# Patient Record
Sex: MALE | Race: WHITE | NOT HISPANIC OR LATINO | Employment: FULL TIME | URBAN - METROPOLITAN AREA
[De-identification: names, ages, dates, MRNs, and addresses within clinical notes are randomized per-mention and may not be internally consistent; named-entity substitution may affect disease eponyms.]

---

## 2017-01-12 ENCOUNTER — APPOINTMENT (OUTPATIENT)
Dept: GENERAL RADIOLOGY | Facility: HOSPITAL | Age: 47
End: 2017-01-12
Attending: THORACIC SURGERY (CARDIOTHORACIC VASCULAR SURGERY)

## 2017-01-12 ENCOUNTER — ANESTHESIA EVENT (OUTPATIENT)
Dept: PERIOP | Facility: HOSPITAL | Age: 47
End: 2017-01-12

## 2017-01-12 ENCOUNTER — APPOINTMENT (OUTPATIENT)
Dept: CARDIOLOGY | Facility: HOSPITAL | Age: 47
End: 2017-01-12
Attending: THORACIC SURGERY (CARDIOTHORACIC VASCULAR SURGERY)

## 2017-01-12 ENCOUNTER — HOSPITAL ENCOUNTER (INPATIENT)
Facility: HOSPITAL | Age: 47
LOS: 5 days | Discharge: HOME HEALTH | End: 2017-01-18
Attending: EMERGENCY MEDICINE | Admitting: INTERNAL MEDICINE

## 2017-01-12 ENCOUNTER — APPOINTMENT (OUTPATIENT)
Dept: RESPIRATORY THERAPY | Facility: HOSPITAL | Age: 47
End: 2017-01-12
Attending: THORACIC SURGERY (CARDIOTHORACIC VASCULAR SURGERY)

## 2017-01-12 ENCOUNTER — APPOINTMENT (OUTPATIENT)
Dept: GENERAL RADIOLOGY | Facility: HOSPITAL | Age: 47
End: 2017-01-12

## 2017-01-12 DIAGNOSIS — Z95.1 S/P CABG (CORONARY ARTERY BYPASS GRAFT): ICD-10-CM

## 2017-01-12 DIAGNOSIS — R07.9 CHEST PAIN AT REST: Primary | ICD-10-CM

## 2017-01-12 DIAGNOSIS — R53.1 GENERALIZED WEAKNESS: ICD-10-CM

## 2017-01-12 DIAGNOSIS — I20.8 ANGINA AT REST (HCC): ICD-10-CM

## 2017-01-12 LAB
ABO GROUP BLD: NORMAL
ALBUMIN SERPL-MCNC: 4.3 G/DL (ref 3.5–5.2)
ALBUMIN SERPL-MCNC: 4.4 G/DL (ref 3.5–5.2)
ALBUMIN/GLOB SERPL: 1.5 G/DL
ALBUMIN/GLOB SERPL: 1.6 G/DL
ALP SERPL-CCNC: 73 U/L (ref 39–117)
ALP SERPL-CCNC: 77 U/L (ref 39–117)
ALT SERPL W P-5'-P-CCNC: 48 U/L (ref 1–41)
ALT SERPL W P-5'-P-CCNC: 51 U/L (ref 1–41)
ANION GAP SERPL CALCULATED.3IONS-SCNC: 13.7 MMOL/L
ANION GAP SERPL CALCULATED.3IONS-SCNC: 14.9 MMOL/L
AST SERPL-CCNC: 31 U/L (ref 1–40)
AST SERPL-CCNC: 32 U/L (ref 1–40)
BASOPHILS # BLD AUTO: 0.03 10*3/MM3 (ref 0–0.2)
BASOPHILS # BLD AUTO: 0.03 10*3/MM3 (ref 0–0.2)
BASOPHILS NFR BLD AUTO: 0.4 % (ref 0–1.5)
BASOPHILS NFR BLD AUTO: 0.4 % (ref 0–1.5)
BILIRUB SERPL-MCNC: 0.3 MG/DL (ref 0.1–1.2)
BILIRUB SERPL-MCNC: 0.4 MG/DL (ref 0.1–1.2)
BILIRUB UR QL STRIP: NEGATIVE
BLD GP AB SCN SERPL QL: NEGATIVE
BUN BLD-MCNC: 10 MG/DL (ref 6–20)
BUN BLD-MCNC: 16 MG/DL (ref 6–20)
BUN/CREAT SERPL: 11.6 (ref 7–25)
BUN/CREAT SERPL: 14.8 (ref 7–25)
CALCIUM SPEC-SCNC: 9.5 MG/DL (ref 8.6–10.5)
CALCIUM SPEC-SCNC: 9.6 MG/DL (ref 8.6–10.5)
CHLORIDE SERPL-SCNC: 100 MMOL/L (ref 98–107)
CHLORIDE SERPL-SCNC: 101 MMOL/L (ref 98–107)
CHOLEST SERPL-MCNC: 144 MG/DL (ref 0–200)
CLARITY UR: CLEAR
CLOSE TME COLL+ADP + EPINEP PNL BLD: 74 %
CO2 SERPL-SCNC: 26.1 MMOL/L (ref 22–29)
CO2 SERPL-SCNC: 26.3 MMOL/L (ref 22–29)
COLOR UR: YELLOW
CREAT BLD-MCNC: 0.86 MG/DL (ref 0.76–1.27)
CREAT BLD-MCNC: 1.08 MG/DL (ref 0.76–1.27)
DEPRECATED RDW RBC AUTO: 41.7 FL (ref 37–54)
DEPRECATED RDW RBC AUTO: 41.8 FL (ref 37–54)
EOSINOPHIL # BLD AUTO: 0.23 10*3/MM3 (ref 0–0.7)
EOSINOPHIL # BLD AUTO: 0.25 10*3/MM3 (ref 0–0.7)
EOSINOPHIL NFR BLD AUTO: 3.1 % (ref 0.3–6.2)
EOSINOPHIL NFR BLD AUTO: 3.2 % (ref 0.3–6.2)
ERYTHROCYTE [DISTWIDTH] IN BLOOD BY AUTOMATED COUNT: 11.5 % (ref 11.5–14.5)
ERYTHROCYTE [DISTWIDTH] IN BLOOD BY AUTOMATED COUNT: 11.6 % (ref 11.5–14.5)
GFR SERPL CREATININE-BSD FRML MDRD: 73 ML/MIN/1.73
GFR SERPL CREATININE-BSD FRML MDRD: 95 ML/MIN/1.73
GLOBULIN UR ELPH-MCNC: 2.7 GM/DL
GLOBULIN UR ELPH-MCNC: 2.8 GM/DL
GLUCOSE BLD-MCNC: 167 MG/DL (ref 65–99)
GLUCOSE BLD-MCNC: 234 MG/DL (ref 65–99)
GLUCOSE UR STRIP-MCNC: NEGATIVE MG/DL
HBA1C MFR BLD: 7.2 % (ref 4.8–5.6)
HCT VFR BLD AUTO: 43.6 % (ref 40.4–52.2)
HCT VFR BLD AUTO: 45.5 % (ref 40.4–52.2)
HDLC SERPL-MCNC: 29 MG/DL (ref 40–60)
HGB BLD-MCNC: 15.7 G/DL (ref 13.7–17.6)
HGB BLD-MCNC: 15.7 G/DL (ref 13.7–17.6)
HGB UR QL STRIP.AUTO: NEGATIVE
IMM GRANULOCYTES # BLD: 0 10*3/MM3 (ref 0–0.03)
IMM GRANULOCYTES # BLD: 0.02 10*3/MM3 (ref 0–0.03)
IMM GRANULOCYTES NFR BLD: 0 % (ref 0–0.5)
IMM GRANULOCYTES NFR BLD: 0.3 % (ref 0–0.5)
KETONES UR QL STRIP: NEGATIVE
LDLC SERPL CALC-MCNC: 82 MG/DL (ref 0–100)
LDLC/HDLC SERPL: 2.83 {RATIO}
LEUKOCYTE ESTERASE UR QL STRIP.AUTO: NEGATIVE
LYMPHOCYTES # BLD AUTO: 3.07 10*3/MM3 (ref 0.9–4.8)
LYMPHOCYTES # BLD AUTO: 3.73 10*3/MM3 (ref 0.9–4.8)
LYMPHOCYTES NFR BLD AUTO: 42.5 % (ref 19.6–45.3)
LYMPHOCYTES NFR BLD AUTO: 46.3 % (ref 19.6–45.3)
MCH RBC QN AUTO: 34.3 PG (ref 27–32.7)
MCH RBC QN AUTO: 35.7 PG (ref 27–32.7)
MCHC RBC AUTO-ENTMCNC: 34.5 G/DL (ref 32.6–36.4)
MCHC RBC AUTO-ENTMCNC: 36 G/DL (ref 32.6–36.4)
MCV RBC AUTO: 99.1 FL (ref 79.8–96.2)
MCV RBC AUTO: 99.3 FL (ref 79.8–96.2)
MONOCYTES # BLD AUTO: 0.47 10*3/MM3 (ref 0.2–1.2)
MONOCYTES # BLD AUTO: 0.74 10*3/MM3 (ref 0.2–1.2)
MONOCYTES NFR BLD AUTO: 6.5 % (ref 5–12)
MONOCYTES NFR BLD AUTO: 9.2 % (ref 5–12)
NEUTROPHILS # BLD AUTO: 3.3 10*3/MM3 (ref 1.9–8.1)
NEUTROPHILS # BLD AUTO: 3.4 10*3/MM3 (ref 1.9–8.1)
NEUTROPHILS NFR BLD AUTO: 41 % (ref 42.7–76)
NEUTROPHILS NFR BLD AUTO: 47.1 % (ref 42.7–76)
NITRITE UR QL STRIP: NEGATIVE
PH UR STRIP.AUTO: 7 [PH] (ref 5–8)
PLATELET # BLD AUTO: 196 10*3/MM3 (ref 140–500)
PLATELET # BLD AUTO: 200 10*3/MM3 (ref 140–500)
PMV BLD AUTO: 10.3 FL (ref 6–12)
PMV BLD AUTO: 10.3 FL (ref 6–12)
POTASSIUM BLD-SCNC: 4 MMOL/L (ref 3.5–5.2)
POTASSIUM BLD-SCNC: 4.1 MMOL/L (ref 3.5–5.2)
PROT SERPL-MCNC: 7.1 G/DL (ref 6–8.5)
PROT SERPL-MCNC: 7.1 G/DL (ref 6–8.5)
PROT UR QL STRIP: NEGATIVE
RBC # BLD AUTO: 4.4 10*6/MM3 (ref 4.6–6)
RBC # BLD AUTO: 4.58 10*6/MM3 (ref 4.6–6)
RH BLD: POSITIVE
SODIUM BLD-SCNC: 140 MMOL/L (ref 136–145)
SODIUM BLD-SCNC: 142 MMOL/L (ref 136–145)
SP GR UR STRIP: 1.02 (ref 1–1.03)
TRIGL SERPL-MCNC: 165 MG/DL (ref 0–150)
TROPONIN T SERPL-MCNC: <0.01 NG/ML (ref 0–0.03)
TROPONIN T SERPL-MCNC: <0.01 NG/ML (ref 0–0.03)
UROBILINOGEN UR QL STRIP: NORMAL
VLDLC SERPL-MCNC: 33 MG/DL (ref 5–40)
WBC NRBC COR # BLD: 7.22 10*3/MM3 (ref 4.5–10.7)
WBC NRBC COR # BLD: 8.05 10*3/MM3 (ref 4.5–10.7)

## 2017-01-12 PROCEDURE — G0378 HOSPITAL OBSERVATION PER HR: HCPCS

## 2017-01-12 PROCEDURE — 99285 EMERGENCY DEPT VISIT HI MDM: CPT

## 2017-01-12 PROCEDURE — 93010 ELECTROCARDIOGRAM REPORT: CPT | Performed by: INTERNAL MEDICINE

## 2017-01-12 PROCEDURE — 93571 IV DOP VEL&/PRESS C FLO 1ST: CPT | Performed by: INTERNAL MEDICINE

## 2017-01-12 PROCEDURE — C1769 GUIDE WIRE: HCPCS | Performed by: INTERNAL MEDICINE

## 2017-01-12 PROCEDURE — B2151ZZ FLUOROSCOPY OF LEFT HEART USING LOW OSMOLAR CONTRAST: ICD-10-PCS | Performed by: INTERNAL MEDICINE

## 2017-01-12 PROCEDURE — 99205 OFFICE O/P NEW HI 60 MIN: CPT | Performed by: PHYSICIAN ASSISTANT

## 2017-01-12 PROCEDURE — 4A023N7 MEASUREMENT OF CARDIAC SAMPLING AND PRESSURE, LEFT HEART, PERCUTANEOUS APPROACH: ICD-10-PCS | Performed by: INTERNAL MEDICINE

## 2017-01-12 PROCEDURE — 4A033BC MEASUREMENT OF ARTERIAL PRESSURE, CORONARY, PERCUTANEOUS APPROACH: ICD-10-PCS | Performed by: INTERNAL MEDICINE

## 2017-01-12 PROCEDURE — 93880 EXTRACRANIAL BILAT STUDY: CPT

## 2017-01-12 PROCEDURE — 71020 HC CHEST PA AND LATERAL: CPT

## 2017-01-12 PROCEDURE — 86850 RBC ANTIBODY SCREEN: CPT

## 2017-01-12 PROCEDURE — 25010000002 BH (CUPID ONLY) ADENOSINE 6 MG/100ML MIXTURE: Performed by: INTERNAL MEDICINE

## 2017-01-12 PROCEDURE — 25010000002 MIDAZOLAM PER 1 MG: Performed by: INTERNAL MEDICINE

## 2017-01-12 PROCEDURE — 81003 URINALYSIS AUTO W/O SCOPE: CPT | Performed by: THORACIC SURGERY (CARDIOTHORACIC VASCULAR SURGERY)

## 2017-01-12 PROCEDURE — 94010 BREATHING CAPACITY TEST: CPT

## 2017-01-12 PROCEDURE — 84484 ASSAY OF TROPONIN QUANT: CPT | Performed by: EMERGENCY MEDICINE

## 2017-01-12 PROCEDURE — C1894 INTRO/SHEATH, NON-LASER: HCPCS | Performed by: INTERNAL MEDICINE

## 2017-01-12 PROCEDURE — 83036 HEMOGLOBIN GLYCOSYLATED A1C: CPT | Performed by: THORACIC SURGERY (CARDIOTHORACIC VASCULAR SURGERY)

## 2017-01-12 PROCEDURE — 85576 BLOOD PLATELET AGGREGATION: CPT | Performed by: THORACIC SURGERY (CARDIOTHORACIC VASCULAR SURGERY)

## 2017-01-12 PROCEDURE — 93005 ELECTROCARDIOGRAM TRACING: CPT | Performed by: EMERGENCY MEDICINE

## 2017-01-12 PROCEDURE — 93458 L HRT ARTERY/VENTRICLE ANGIO: CPT | Performed by: INTERNAL MEDICINE

## 2017-01-12 PROCEDURE — 93970 EXTREMITY STUDY: CPT

## 2017-01-12 PROCEDURE — 86900 BLOOD TYPING SEROLOGIC ABO: CPT

## 2017-01-12 PROCEDURE — 86901 BLOOD TYPING SEROLOGIC RH(D): CPT

## 2017-01-12 PROCEDURE — 80053 COMPREHEN METABOLIC PANEL: CPT | Performed by: EMERGENCY MEDICINE

## 2017-01-12 PROCEDURE — 80053 COMPREHEN METABOLIC PANEL: CPT | Performed by: THORACIC SURGERY (CARDIOTHORACIC VASCULAR SURGERY)

## 2017-01-12 PROCEDURE — 84484 ASSAY OF TROPONIN QUANT: CPT | Performed by: INTERNAL MEDICINE

## 2017-01-12 PROCEDURE — 0 IOPAMIDOL PER 1 ML: Performed by: INTERNAL MEDICINE

## 2017-01-12 PROCEDURE — 25010000002 HEPARIN (PORCINE) PER 1000 UNITS: Performed by: INTERNAL MEDICINE

## 2017-01-12 PROCEDURE — 93005 ELECTROCARDIOGRAM TRACING: CPT | Performed by: INTERNAL MEDICINE

## 2017-01-12 PROCEDURE — 94799 UNLISTED PULMONARY SVC/PX: CPT

## 2017-01-12 PROCEDURE — 85025 COMPLETE CBC W/AUTO DIFF WBC: CPT | Performed by: THORACIC SURGERY (CARDIOTHORACIC VASCULAR SURGERY)

## 2017-01-12 PROCEDURE — 85025 COMPLETE CBC W/AUTO DIFF WBC: CPT | Performed by: EMERGENCY MEDICINE

## 2017-01-12 PROCEDURE — B2111ZZ FLUOROSCOPY OF MULTIPLE CORONARY ARTERIES USING LOW OSMOLAR CONTRAST: ICD-10-PCS | Performed by: INTERNAL MEDICINE

## 2017-01-12 PROCEDURE — 25010000002 FENTANYL CITRATE (PF) 100 MCG/2ML SOLUTION: Performed by: INTERNAL MEDICINE

## 2017-01-12 PROCEDURE — 99153 MOD SED SAME PHYS/QHP EA: CPT | Performed by: INTERNAL MEDICINE

## 2017-01-12 PROCEDURE — C1887 CATHETER, GUIDING: HCPCS | Performed by: INTERNAL MEDICINE

## 2017-01-12 PROCEDURE — 80061 LIPID PANEL: CPT | Performed by: THORACIC SURGERY (CARDIOTHORACIC VASCULAR SURGERY)

## 2017-01-12 PROCEDURE — 99152 MOD SED SAME PHYS/QHP 5/>YRS: CPT | Performed by: INTERNAL MEDICINE

## 2017-01-12 PROCEDURE — 86923 COMPATIBILITY TEST ELECTRIC: CPT

## 2017-01-12 RX ORDER — ALPRAZOLAM 0.25 MG/1
0.25 TABLET ORAL EVERY 8 HOURS PRN
Status: DISCONTINUED | OUTPATIENT
Start: 2017-01-12 | End: 2017-01-13

## 2017-01-12 RX ORDER — ACETAMINOPHEN 325 MG/1
650 TABLET ORAL EVERY 4 HOURS PRN
Status: DISCONTINUED | OUTPATIENT
Start: 2017-01-12 | End: 2017-01-13

## 2017-01-12 RX ORDER — SODIUM CHLORIDE 9 MG/ML
75 INJECTION, SOLUTION INTRAVENOUS CONTINUOUS
Status: DISCONTINUED | OUTPATIENT
Start: 2017-01-12 | End: 2017-01-12

## 2017-01-12 RX ORDER — ASPIRIN 325 MG
325 TABLET ORAL ONCE
Status: DISCONTINUED | OUTPATIENT
Start: 2017-01-12 | End: 2017-01-12

## 2017-01-12 RX ORDER — HEPARIN SODIUM 1000 [USP'U]/ML
INJECTION, SOLUTION INTRAVENOUS; SUBCUTANEOUS AS NEEDED
Status: DISCONTINUED | OUTPATIENT
Start: 2017-01-12 | End: 2017-01-12 | Stop reason: HOSPADM

## 2017-01-12 RX ORDER — ONDANSETRON 4 MG/1
4 TABLET, ORALLY DISINTEGRATING ORAL EVERY 6 HOURS PRN
Status: DISCONTINUED | OUTPATIENT
Start: 2017-01-12 | End: 2017-01-18 | Stop reason: HOSPADM

## 2017-01-12 RX ORDER — RANITIDINE 150 MG/1
150 TABLET ORAL 2 TIMES DAILY
COMMUNITY
End: 2017-01-18 | Stop reason: HOSPADM

## 2017-01-12 RX ORDER — GLIMEPIRIDE 2 MG/1
2 TABLET ORAL 2 TIMES DAILY WITH MEALS
Status: DISCONTINUED | OUTPATIENT
Start: 2017-01-12 | End: 2017-01-13

## 2017-01-12 RX ORDER — MORPHINE SULFATE 2 MG/ML
1 INJECTION, SOLUTION INTRAMUSCULAR; INTRAVENOUS EVERY 4 HOURS PRN
Status: DISCONTINUED | OUTPATIENT
Start: 2017-01-12 | End: 2017-01-13

## 2017-01-12 RX ORDER — SODIUM CHLORIDE 9 MG/ML
INJECTION, SOLUTION INTRAVENOUS CONTINUOUS PRN
Status: DISCONTINUED | OUTPATIENT
Start: 2017-01-12 | End: 2017-01-12 | Stop reason: HOSPADM

## 2017-01-12 RX ORDER — CHLORHEXIDINE GLUCONATE 0.12 MG/ML
15 RINSE ORAL EVERY 12 HOURS
Status: COMPLETED | OUTPATIENT
Start: 2017-01-12 | End: 2017-01-13

## 2017-01-12 RX ORDER — ATORVASTATIN CALCIUM 20 MG/1
20 TABLET, FILM COATED ORAL DAILY
COMMUNITY
End: 2017-02-14 | Stop reason: ALTCHOICE

## 2017-01-12 RX ORDER — FENTANYL CITRATE 50 UG/ML
INJECTION, SOLUTION INTRAMUSCULAR; INTRAVENOUS AS NEEDED
Status: DISCONTINUED | OUTPATIENT
Start: 2017-01-12 | End: 2017-01-12 | Stop reason: HOSPADM

## 2017-01-12 RX ORDER — TEMAZEPAM 15 MG/1
15 CAPSULE ORAL NIGHTLY PRN
Status: DISCONTINUED | OUTPATIENT
Start: 2017-01-12 | End: 2017-01-18 | Stop reason: HOSPADM

## 2017-01-12 RX ORDER — MIDAZOLAM HYDROCHLORIDE 1 MG/ML
2 INJECTION INTRAMUSCULAR; INTRAVENOUS
Status: COMPLETED | OUTPATIENT
Start: 2017-01-13 | End: 2017-01-13

## 2017-01-12 RX ORDER — ASPIRIN 325 MG
325 TABLET ORAL DAILY
Status: DISCONTINUED | OUTPATIENT
Start: 2017-01-13 | End: 2017-01-13

## 2017-01-12 RX ORDER — GLIMEPIRIDE 2 MG/1
2 TABLET ORAL 2 TIMES DAILY
COMMUNITY
End: 2017-01-18 | Stop reason: HOSPADM

## 2017-01-12 RX ORDER — ASPIRIN 325 MG
325 TABLET ORAL DAILY
COMMUNITY
End: 2017-01-18 | Stop reason: HOSPADM

## 2017-01-12 RX ORDER — LISINOPRIL 20 MG/1
20 TABLET ORAL DAILY
COMMUNITY
End: 2017-01-18 | Stop reason: HOSPADM

## 2017-01-12 RX ORDER — CHLORHEXIDINE GLUCONATE 500 MG/1
1 CLOTH TOPICAL EVERY 12 HOURS PRN
Status: DISCONTINUED | OUTPATIENT
Start: 2017-01-12 | End: 2017-01-13

## 2017-01-12 RX ORDER — AMIODARONE HYDROCHLORIDE 200 MG/1
200 TABLET ORAL EVERY 8 HOURS
Status: DISCONTINUED | OUTPATIENT
Start: 2017-01-12 | End: 2017-01-13

## 2017-01-12 RX ORDER — ASPIRIN 325 MG
325 TABLET ORAL DAILY
Status: DISCONTINUED | OUTPATIENT
Start: 2017-01-12 | End: 2017-01-12

## 2017-01-12 RX ORDER — MAGNESIUM HYDROXIDE/ALUMINUM HYDROXICE/SIMETHICONE 120; 1200; 1200 MG/30ML; MG/30ML; MG/30ML
30 SUSPENSION ORAL EVERY 6 HOURS PRN
Status: DISCONTINUED | OUTPATIENT
Start: 2017-01-12 | End: 2017-01-13

## 2017-01-12 RX ORDER — FAMOTIDINE 10 MG/ML
20 INJECTION, SOLUTION INTRAVENOUS
Status: COMPLETED | OUTPATIENT
Start: 2017-01-13 | End: 2017-01-13

## 2017-01-12 RX ORDER — ONDANSETRON 2 MG/ML
4 INJECTION INTRAMUSCULAR; INTRAVENOUS EVERY 6 HOURS PRN
Status: DISCONTINUED | OUTPATIENT
Start: 2017-01-12 | End: 2017-01-18 | Stop reason: HOSPADM

## 2017-01-12 RX ORDER — LISINOPRIL 20 MG/1
20 TABLET ORAL DAILY
Status: DISCONTINUED | OUTPATIENT
Start: 2017-01-12 | End: 2017-01-13

## 2017-01-12 RX ORDER — CEFAZOLIN SODIUM 2 G/100ML
2 INJECTION, SOLUTION INTRAVENOUS
Status: COMPLETED | OUTPATIENT
Start: 2017-01-13 | End: 2017-01-13

## 2017-01-12 RX ORDER — NITROGLYCERIN 0.4 MG/1
0.4 TABLET SUBLINGUAL
Status: DISCONTINUED | OUTPATIENT
Start: 2017-01-12 | End: 2017-01-13

## 2017-01-12 RX ORDER — HYDROCODONE BITARTRATE AND ACETAMINOPHEN 5; 325 MG/1; MG/1
1 TABLET ORAL EVERY 4 HOURS PRN
Status: DISCONTINUED | OUTPATIENT
Start: 2017-01-12 | End: 2017-01-13

## 2017-01-12 RX ORDER — POTASSIUM CHLORIDE 1.5 G/1.77G
40 POWDER, FOR SOLUTION ORAL AS NEEDED
Status: DISCONTINUED | OUTPATIENT
Start: 2017-01-12 | End: 2017-01-13

## 2017-01-12 RX ORDER — POTASSIUM CHLORIDE 750 MG/1
40 CAPSULE, EXTENDED RELEASE ORAL AS NEEDED
Status: DISCONTINUED | OUTPATIENT
Start: 2017-01-12 | End: 2017-01-13

## 2017-01-12 RX ORDER — MIDAZOLAM HYDROCHLORIDE 1 MG/ML
INJECTION INTRAMUSCULAR; INTRAVENOUS AS NEEDED
Status: DISCONTINUED | OUTPATIENT
Start: 2017-01-12 | End: 2017-01-12 | Stop reason: HOSPADM

## 2017-01-12 RX ORDER — DIPHENHYDRAMINE HCL 25 MG
25 CAPSULE ORAL NIGHTLY PRN
Status: DISCONTINUED | OUTPATIENT
Start: 2017-01-12 | End: 2017-01-13

## 2017-01-12 RX ORDER — DEXTROSE MONOHYDRATE 25 G/50ML
25 INJECTION, SOLUTION INTRAVENOUS
Status: DISCONTINUED | OUTPATIENT
Start: 2017-01-12 | End: 2017-01-13

## 2017-01-12 RX ORDER — NICOTINE POLACRILEX 4 MG
15 LOZENGE BUCCAL
Status: DISCONTINUED | OUTPATIENT
Start: 2017-01-12 | End: 2017-01-13

## 2017-01-12 RX ORDER — ONDANSETRON 4 MG/1
4 TABLET, FILM COATED ORAL EVERY 6 HOURS PRN
Status: DISCONTINUED | OUTPATIENT
Start: 2017-01-12 | End: 2017-01-18 | Stop reason: HOSPADM

## 2017-01-12 RX ORDER — SODIUM CHLORIDE 0.9 % (FLUSH) 0.9 %
1-10 SYRINGE (ML) INJECTION AS NEEDED
Status: DISCONTINUED | OUTPATIENT
Start: 2017-01-12 | End: 2017-01-18 | Stop reason: HOSPADM

## 2017-01-12 RX ORDER — LIDOCAINE HYDROCHLORIDE 20 MG/ML
INJECTION, SOLUTION INFILTRATION; PERINEURAL AS NEEDED
Status: DISCONTINUED | OUTPATIENT
Start: 2017-01-12 | End: 2017-01-12 | Stop reason: HOSPADM

## 2017-01-12 RX ORDER — NALOXONE HCL 0.4 MG/ML
0.4 VIAL (ML) INJECTION
Status: DISCONTINUED | OUTPATIENT
Start: 2017-01-12 | End: 2017-01-13

## 2017-01-12 RX ORDER — ATORVASTATIN CALCIUM 40 MG/1
40 TABLET, FILM COATED ORAL NIGHTLY
Status: DISCONTINUED | OUTPATIENT
Start: 2017-01-12 | End: 2017-01-18 | Stop reason: HOSPADM

## 2017-01-12 RX ORDER — FAMOTIDINE 20 MG/1
20 TABLET, FILM COATED ORAL 2 TIMES DAILY
Status: DISCONTINUED | OUTPATIENT
Start: 2017-01-12 | End: 2017-01-13

## 2017-01-12 RX ADMIN — LISINOPRIL 20 MG: 20 TABLET ORAL at 12:32

## 2017-01-12 RX ADMIN — MUPIROCIN 1 APPLICATION: 20 OINTMENT TOPICAL at 21:52

## 2017-01-12 RX ADMIN — FAMOTIDINE 20 MG: 20 TABLET, FILM COATED ORAL at 12:32

## 2017-01-12 RX ADMIN — GLIMEPIRIDE 2 MG: 2 TABLET ORAL at 20:17

## 2017-01-12 RX ADMIN — GLIMEPIRIDE 2 MG: 2 TABLET ORAL at 12:33

## 2017-01-12 RX ADMIN — ATORVASTATIN CALCIUM 40 MG: 40 TABLET, FILM COATED ORAL at 20:16

## 2017-01-12 RX ADMIN — FAMOTIDINE 20 MG: 20 TABLET, FILM COATED ORAL at 20:17

## 2017-01-12 RX ADMIN — TEMAZEPAM 15 MG: 15 CAPSULE ORAL at 22:15

## 2017-01-12 RX ADMIN — AMIODARONE HYDROCHLORIDE 200 MG: 200 TABLET ORAL at 20:16

## 2017-01-12 RX ADMIN — NITROGLYCERIN 1 INCH: 20 OINTMENT TOPICAL at 01:30

## 2017-01-12 RX ADMIN — CHLORHEXIDINE GLUCONATE 15 ML: 1.2 RINSE ORAL at 20:16

## 2017-01-12 NOTE — CONSULTS
Patient Care Team:  Rose Marie Farr MD as PCP - General (Internal Medicine)    Chief complaint: Chest pain    History of Present Illness:  Mr. Savage is a 46 year-old male with PMH of CAD with prior PCI in 2008, DM, HTN, HLD, and tobacco abuse, who presented to the ED this morning with a chief complaint of chest pain. He describes the chest pain as a pressure that started last night and became progressively worse. It was associated with shortness of breath and diaphoresis and was similar to the chest pain he had in 2008 prior to his stents. He took an aspirin and was given Nitroglycerin by EMS, which gave him some relief. 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 He was seen by Cardiology and underwent cardiac catheterization this morning, which revealed EF 55% and severe 3 vessel CAD. Cardiac Surgery has been consulted for surgical evaluation.      Review of Systems   Constitutional: Positive for activity change, diaphoresis and fatigue.   Respiratory: Positive for chest tightness and shortness of breath.    Cardiovascular: Positive for chest pain.   All other systems reviewed and are negative.       Past Medical History   Diagnosis Date   • Diabetes mellitus    • Heart attack 2007     STENTS X 2 PLACED IN COUNTRY OF [place]   • Hypertension      Past Surgical History   Procedure Laterality Date   • Coronary angioplasty with stent placement  2007     History reviewed. No pertinent family history.  Social History   Substance Use Topics   • Smoking status: Current Every Day Smoker     Packs/day: 1.00     Types: Cigarettes   • Smokeless tobacco: None   • Alcohol use No     Prescriptions Prior to Admission   Medication Sig Dispense Refill Last Dose   • aspirin 325 MG tablet Take 325 mg by mouth Daily.      • atorvastatin (LIPITOR) 20 MG tablet Take 20 mg by mouth Daily.      • glimepiride (AMARYL) 2 MG tablet Take 2 mg by mouth 2 (Two) Times a Day.      • lisinopril  "(PRINIVIL,ZESTRIL) 20 MG tablet Take 20 mg by mouth Daily.      • raNITIdine (ZANTAC) 150 MG tablet Take 150 mg by mouth 2 (Two) Times a Day.      • sitaGLIPtin-metFORMIN (JANUMET)  MG per tablet Take 1 tablet by mouth 2 (Two) Times a Day With Meals.          [START ON 1/13/2017] aspirin 325 mg Oral Daily   atorvastatin 40 mg Oral Nightly   famotidine 20 mg Oral BID   glimepiride 2 mg Oral BID With Meals   lisinopril 20 mg Oral Daily     Allergies:  Review of patient's allergies indicates no known allergies.    Objective      Vital Signs  Temp:  [97.5 °F (36.4 °C)-98.5 °F (36.9 °C)] 97.5 °F (36.4 °C)  Heart Rate:  [61-98] 63  Resp:  [16-18] 16  BP: (125-165)/(69-97) 134/97    Flowsheet Rows         First Filed Value    Admission Height  73\" (185.4 cm) Documented at 01/12/2017 0102    Admission Weight  245 lb (111 kg) Documented at 01/12/2017 0102        73\" (185.4 cm)    Physical Exam   Constitutional: He is oriented to person, place, and time. He appears well-developed and well-nourished. No distress.   HENT:   Head: Normocephalic and atraumatic.   Eyes: EOM are normal. Pupils are equal, round, and reactive to light.   Neck: Normal range of motion. Neck supple.   Cardiovascular: Normal rate, regular rhythm, normal heart sounds and intact distal pulses.    Pressure dressing on right radial. No evidence of hematoma.    Pulmonary/Chest: Effort normal and breath sounds normal.   Abdominal: Soft. Bowel sounds are normal.   Musculoskeletal: Normal range of motion. He exhibits no edema.   Neurological: He is alert and oriented to person, place, and time.   Skin: Skin is warm and dry.   Psychiatric: He has a normal mood and affect. His behavior is normal. Judgment and thought content normal.   Vitals reviewed.      Results Review:   Lab Results (last 24 hours)     Procedure Component Value Units Date/Time    CBC & Differential [[id number]] Collected:  01/12/17 0110    Specimen:  Blood Updated:  01/12/17 0125    " Narrative:       The following orders were created for panel order CBC & Differential.  Procedure                               Abnormality         Status                     ---------                               -----------         ------                     CBC Auto Differential[[id number]]         Abnormal            Final result                 Please view results for these tests on the individual orders.    CBC Auto Differential [[id number]]  (Abnormal) Collected:  01/12/17 0110    Specimen:  Blood Updated:  01/12/17 0125     WBC 8.05 10*3/mm3      RBC 4.40 (L) 10*6/mm3      Hemoglobin 15.7 g/dL      Hematocrit 43.6 %      MCV 99.1 (H) fL      MCH 35.7 (H) pg      MCHC 36.0 g/dL      RDW 11.5 %      RDW-SD 41.8 fl      MPV 10.3 fL      Platelets 200 10*3/mm3      Neutrophil % 41.0 (L) %      Lymphocyte % 46.3 (H) %      Monocyte % 9.2 %      Eosinophil % 3.1 %      Basophil % 0.4 %      Immature Grans % 0.0 %      Neutrophils, Absolute 3.30 10*3/mm3      Lymphocytes, Absolute 3.73 10*3/mm3      Monocytes, Absolute 0.74 10*3/mm3      Eosinophils, Absolute 0.25 10*3/mm3      Basophils, Absolute 0.03 10*3/mm3      Immature Grans, Absolute 0.00 10*3/mm3     Comprehensive Metabolic Panel [[id number]]  (Abnormal) Collected:  01/12/17 0110    Specimen:  Blood Updated:  01/12/17 0143     Glucose 234 (H) mg/dL      BUN 16 mg/dL      Creatinine 1.08 mg/dL      Sodium 140 mmol/L      Potassium 4.0 mmol/L      Chloride 100 mmol/L      CO2 26.3 mmol/L      Calcium 9.6 mg/dL      Total Protein 7.1 g/dL      Albumin 4.30 g/dL      ALT (SGPT) 48 (H) U/L      AST (SGOT) 31 U/L      Alkaline Phosphatase 77 U/L      Total Bilirubin 0.3 mg/dL      eGFR Non African Amer 73 mL/min/1.73      Globulin 2.8 gm/dL      A/G Ratio 1.5 g/dL      BUN/Creatinine Ratio 14.8      Anion Gap 13.7 mmol/L     Troponin [[id number]]  (Normal) Collected:  01/12/17 0110    Specimen:  Blood Updated:  01/12/17 0143     Troponin T <0.010 ng/mL     Narrative:        Troponin T Reference Ranges:  Less than 0.03 ng/mL:    Negative for AMI  0.03 to 0.09 ng/mL:      Indeterminant for AMI  Greater than 0.09 ng/mL: Positive for AMI    Troponin [[id number]]  (Normal) Collected:  01/12/17 0659    Specimen:  Blood Updated:  01/12/17 0754     Troponin T <0.010 ng/mL     Narrative:       Troponin T Reference Ranges:  Less than 0.03 ng/mL:    Negative for AMI  0.03 to 0.09 ng/mL:      Indeterminant for AMI  Greater than 0.09 ng/mL: Positive for AMI        Cardiac Catheterization 1/12/17:    CORONARY ANGIOGRAPHY:   1. Left main 0% stenosis. The vessel bifurcates into a left anterior descending and left circumflex arteries.  2. Left circumflex artery: 0% proximal stenosis. Gives off a moderate-sized first obtuse marginal branch with a 90% proximal stenosis. There is a 50% mid stenosis followed by a prior stent appeared widely patent. The stent has 0% in-stent restenosis. However the distal edge of the stent there is a 90% stenosis.  3. Left anterior descending artery: 0% proximal stenosis. Gives off a large first diagonal branch with an ostial 70% stenosis. There is a moderate size second diagonal branch with an 80% stenosis. The mid LAD has an eccentric 70% stenosis that had a FFR of 0.79. At the mid to distal LAD had 0% stenosis.  4. Right coronary artery: 0% stenosis of the proximal to mid RCA. There is a long 95% distal RCA stenosis before the bifurcation of the PDA and STEVIE branches. A moderate RV branch has a 70% ostial stenosis. Vessel bifurcates into a moderate-sized PDA and a small STEVIE branch. This appears to be a codominant system.      LEFT VENTRICULAR HEMODYNAMICS:   1. Left ventricular pressure: 103/22  2. Aortic pressure: 112/60     LEFT VENTRICULOGRAPHY:   Normal left ventricular systolic function and wall motion with an ejection fraction estimated at 55%.        Assessment & Plan  1. Severe 3 vessel CAD with preserved LV systolic function & prior PCI--EF 55%  2. HTN--on  ACEI  3. HLD--on statin  4. DM--on oral regimen     Dr. Farrell reviewed cath films and feels that patient is an appropriate candidate for CABG. Patient currently not sure that he wants surgery, but is willing to hear recommendations from Dr. Farrell before making final decision. Dr. Farrell to discuss options with patient and his family.       CARLYLE Blakely  01/12/17  11:57 AM

## 2017-01-12 NOTE — PLAN OF CARE
Problem: Pain, Acute (Adult)  Goal: Identify Related Risk Factors and Signs and Symptoms  Outcome: Ongoing (interventions implemented as appropriate)  Goal: Acceptable Pain Control/Comfort Level  Outcome: Ongoing (interventions implemented as appropriate)    Problem: Activity Intolerance (Adult)  Goal: Identify Related Risk Factors and Signs and Symptoms  Outcome: Ongoing (interventions implemented as appropriate)  Goal: Activity Tolerance  Outcome: Ongoing (interventions implemented as appropriate)  Goal: Effective Energy Conservation Techniques  Outcome: Ongoing (interventions implemented as appropriate)

## 2017-01-12 NOTE — Clinical Note
Hemostasis started on the Artery.  Radial compression device applied to vessel. comfortband Hemostasis achieved successfully. .

## 2017-01-12 NOTE — ED PROVIDER NOTES
EMERGENCY DEPARTMENT ENCOUNTER    CHIEF COMPLAINT  Chief Complaint: chest pain  History given by: patient  History limited by: none  Room Number: 2200/1  PMD: Rose Marie Farr MD      HPI:  Pt is a 46 y.o. male who presents complaining of substernal chest pain onset tonight around 11:30 PM while at rest. He had mild shortness of breath with the pain and some diaphoresis. He reports some mild numbness in distal legs. He did not have any nausea or vomiting. At current, the pain is much improved. Pt took Asprin and was given NTG prior to arrival by EMS. Pt had an MI in 2007 in [place], and had two stents placed. He denies any cardiac issues since. He has no other complaints at this time.    Duration:  About 1.5 hours  Onset: 11:30 PM, at rest  Timing: constant  Location: substernal  Radiation: none stated  Quality: burning  Intensity/Severity: moderate, mild at current  Progression: improved  Associated Symptoms: mild SOB, diaphoresis  Aggravating Factors: none stated  Alleviating Factors: Asprin, NTG  Previous Episodes: MI in 2007 w/ similar sx  Treatment before arrival: Asprin, NTG    PAST MEDICAL HISTORY  Active Ambulatory Problems     Diagnosis Date Noted   • No Active Ambulatory Problems     Resolved Ambulatory Problems     Diagnosis Date Noted   • No Resolved Ambulatory Problems     Past Medical History   Diagnosis Date   • Diabetes mellitus    • Heart attack 2007   • Hypertension        PAST SURGICAL HISTORY  Past Surgical History   Procedure Laterality Date   • Coronary angioplasty with stent placement  2007       FAMILY HISTORY  History reviewed. No pertinent family history.    SOCIAL HISTORY  Social History     Social History   • Marital status:      Spouse name: N/A   • Number of children: N/A   • Years of education: N/A     Occupational History   • Not on file.     Social History Main Topics   • Smoking status: Current Every Day Smoker     Packs/day: 1.00     Types: Cigarettes   • Smokeless  tobacco: Not on file   • Alcohol use No   • Drug use: No   • Sexual activity: Not on file     Other Topics Concern   • Not on file     Social History Narrative   • No narrative on file       ALLERGIES  Review of patient's allergies indicates no known allergies.    REVIEW OF SYSTEMS  Review of Systems   Constitutional: Positive for diaphoresis. Negative for activity change, appetite change and fever.   HENT: Negative for congestion and sore throat.    Eyes: Negative.    Respiratory: Positive for shortness of breath (mild). Negative for cough.    Cardiovascular: Positive for chest pain. Negative for leg swelling.   Gastrointestinal: Negative for abdominal pain, diarrhea and vomiting.   Endocrine: Negative.    Genitourinary: Negative for decreased urine volume and dysuria.   Musculoskeletal: Negative for neck pain.   Skin: Negative for rash and wound.   Allergic/Immunologic: Negative.    Neurological: Negative for weakness, numbness and headaches.   Hematological: Negative.    Psychiatric/Behavioral: Negative.    All other systems reviewed and are negative.      PHYSICAL EXAM  ED Triage Vitals   Temp Heart Rate Resp BP SpO2   -- 01/12/17 0102 01/12/17 0102 01/12/17 0102 01/12/17 0102    90 16 165/81 94 %      Temp src Heart Rate Source Patient Position BP Location FiO2 (%)   -- -- -- -- --              Physical Exam   Constitutional: He is oriented to person, place, and time and well-developed, well-nourished, and in no distress.   HENT:   Head: Normocephalic and atraumatic.   Eyes: EOM are normal. Pupils are equal, round, and reactive to light.   Neck: Normal range of motion. Neck supple.   Cardiovascular: Normal rate, regular rhythm and normal heart sounds.    Pulmonary/Chest: Effort normal and breath sounds normal. No respiratory distress.   Abdominal: Soft. There is no tenderness. There is no rebound and no guarding.   Musculoskeletal: Normal range of motion. He exhibits no edema.   Neurological: He is alert and  oriented to person, place, and time. He has normal sensation and normal strength.   Skin: Skin is warm and dry.   Psychiatric: Mood and affect normal.   Nursing note and vitals reviewed.      LAB RESULTS  Lab Results (last 24 hours)     Procedure Component Value Units Date/Time    CBC & Differential [[id number]] Collected:  01/12/17 0110    Specimen:  Blood Updated:  01/12/17 0125    Narrative:       The following orders were created for panel order CBC & Differential.  Procedure                               Abnormality         Status                     ---------                               -----------         ------                     CBC Auto Differential[[id number]]         Abnormal            Final result                 Please view results for these tests on the individual orders.    Comprehensive Metabolic Panel [[id number]]  (Abnormal) Collected:  01/12/17 0110    Specimen:  Blood Updated:  01/12/17 0143     Glucose 234 (H) mg/dL      BUN 16 mg/dL      Creatinine 1.08 mg/dL      Sodium 140 mmol/L      Potassium 4.0 mmol/L      Chloride 100 mmol/L      CO2 26.3 mmol/L      Calcium 9.6 mg/dL      Total Protein 7.1 g/dL      Albumin 4.30 g/dL      ALT (SGPT) 48 (H) U/L      AST (SGOT) 31 U/L      Alkaline Phosphatase 77 U/L      Total Bilirubin 0.3 mg/dL      eGFR Non African Amer 73 mL/min/1.73      Globulin 2.8 gm/dL      A/G Ratio 1.5 g/dL      BUN/Creatinine Ratio 14.8      Anion Gap 13.7 mmol/L     Troponin [[id number]]  (Normal) Collected:  01/12/17 0110    Specimen:  Blood Updated:  01/12/17 0143     Troponin T <0.010 ng/mL     Narrative:       Troponin T Reference Ranges:  Less than 0.03 ng/mL:    Negative for AMI  0.03 to 0.09 ng/mL:      Indeterminant for AMI  Greater than 0.09 ng/mL: Positive for AMI    CBC Auto Differential [[id number]]  (Abnormal) Collected:  01/12/17 0110    Specimen:  Blood Updated:  01/12/17 0125     WBC 8.05 10*3/mm3      RBC 4.40 (L) 10*6/mm3      Hemoglobin 15.7 g/dL       Hematocrit 43.6 %      MCV 99.1 (H) fL      MCH 35.7 (H) pg      MCHC 36.0 g/dL      RDW 11.5 %      RDW-SD 41.8 fl      MPV 10.3 fL      Platelets 200 10*3/mm3      Neutrophil % 41.0 (L) %      Lymphocyte % 46.3 (H) %      Monocyte % 9.2 %      Eosinophil % 3.1 %      Basophil % 0.4 %      Immature Grans % 0.0 %      Neutrophils, Absolute 3.30 10*3/mm3      Lymphocytes, Absolute 3.73 10*3/mm3      Monocytes, Absolute 0.74 10*3/mm3      Eosinophils, Absolute 0.25 10*3/mm3      Basophils, Absolute 0.03 10*3/mm3      Immature Grans, Absolute 0.00 10*3/mm3           I ordered the above labs and reviewed the results    RADIOLOGY  XR Chest 2 View    (Results Pending)        I ordered the above noted radiological studies. Interpreted by radiologist. Reviewed by me in PACS.       PROCEDURES  Procedures    EKG         EKG time: 0106  Rhythm/Rate: NSR, rate 83  P waves and MD: normal  QRS, axis: normal   ST and T waves: normal   Interpreted Contemporaneously by me, independently viewed  No prior EKG available for comparison      PROGRESS AND CONSULTS  ED Course     0116 - EKG shows no acute changes, will order labs including troponin for further evaluation. NTG ordered for pain relief.    0216 - Pt rechecked, he is currently pain free and resting comfortably. Informed him of plan for admission for cardiac rule out. Pt understands and agrees with the plan. All questions answered.    0219 - Call w/ Dr. Abdi who agrees to admit the pt.      MEDICAL DECISION MAKING  Results were reviewed/discussed with the patient and they were also made aware of online access. Pt also made aware that some labs, such as cultures, will not be resulted during ER visit and follow up with PMD is necessary.     MDM  Number of Diagnoses or Management Options     Amount and/or Complexity of Data Reviewed  Clinical lab tests: ordered and reviewed  Tests in the radiology section of CPT®: ordered and reviewed  Tests in the medicine section of CPT®:  ordered and reviewed  Discuss the patient with other providers: yes  Independent visualization of images, tracings, or specimens: yes    Patient Progress  Patient progress: stable    HEART Score  History: Moderately suspicious (+1)  ECG: Normal (+0)  Age: 45 through 65 (+1)  Risk Factors: 3 or more risk factors OR history of atherosclerotic disease (+2)  Troponin: Normal limit or lower (+0)  Total: 4      DIAGNOSIS  Final diagnoses:   Chest pain at rest   Angina at rest       DISPOSITION  ADMISSION: Patient admitted by Dr. Abdi to telemetry.    Discussed treatment plan and reason for admission with pt/family and admitting physician.  Pt/family voiced understanding of the plan for admission for further testing/treatment as needed.       Latest Documented Vital Signs:  As of 5:00 AM  BP- 135/71 HR- 65 Temp- 97.5 °F (36.4 °C) (Oral) O2 sat- 97%    --  Documentation assistance provided by petey Emanuel MD for Dr. Tae Emanuel.  Information recorded by the scribe was done at my direction and has been verified and validated by me.       Maikel Hansen  01/12/17 0223       Tae Emanuel MD  01/12/17 8454

## 2017-01-12 NOTE — Clinical Note
The left ventricle was injected and visualized. Rate = 12 mL/sec. Total volume = 24 mL. At 450 PSI.

## 2017-01-12 NOTE — Clinical Note
H&P note has been confirmed for the patient. Procedural consent has been signed.  Staff has reviewed the patients labs. Labs have been reviewed and show abnormalities. Physician is aware of labs.

## 2017-01-12 NOTE — H&P
Date of Hospital Visit:17  Encounter Provider: Quincy Carolina MD  Place of Service: [facility] CARDIOLOGY  Patient Name: Oswald Savage  :1970    Chief complaint: Chest pain    History of Present Illness: Mr. Savage is a 46 year old gentleman with a documented history of coronary artery disease/MI s/p PCI in , diabetes, and hypertension. Does not follow a cardiologist.     Presented to ED with substernal chest pressure that started last night and progressively became worse associated with shortness of breath and diaphoresis. Pain is similar as with his MI.  He took ASA and was given NTG by EMS with some relief. Upon presentation to the ED his BP was 165/81, HR 90 (SR) and SPO2 95 % on RA. His initial troponin was negative.  He does state that the pain is similar to what he had before not quite as severe.  Last probably about an hour.  He denies any prior exertional pain or pressure.    Previous testing: In [place]     Past Medical History   Diagnosis Date   • Diabetes mellitus    • Heart attack      STENTS X 2 PLACED IN COUNTRY OF [place]   • Hypertension        Past Surgical History   Procedure Laterality Date   • Coronary angioplasty with stent placement         No current facility-administered medications on file prior to encounter.      No current outpatient prescriptions on file prior to encounter.       Social History     Social History   • Marital status:      Spouse name: N/A   • Number of children: N/A   • Years of education: N/A     Occupational History   • Not on file.     Social History Main Topics   • Smoking status: Current Every Day Smoker     Packs/day: 1.00     Types: Cigarettes   • Smokeless tobacco: Not on file   • Alcohol use No   • Drug use: No   • Sexual activity: Not on file     Other Topics Concern   • Not on file     Social History Narrative   • No narrative on file     Family medical history.  Father with coronary disease and  "diabetes.  No family history of strokes or cancer.    REVIEW OF SYSTEMS:   All ROS was performed and is Negative except as outlined in HPI    Objective:     Vitals:    01/12/17 0148 01/12/17 0223 01/12/17 0300 01/12/17 0350   BP: 126/69 131/80 127/78 135/71   BP Location: Right arm Right arm Right arm Right arm   Patient Position: Lying Lying Lying Lying   Pulse: 73 69 67 65   Resp: 16 16 16 18   Temp:    97.5 °F (36.4 °C)   TempSrc:    Oral   SpO2: 97% 97% 97% 97%   Weight:    240 lb (109 kg)   Height:    73\" (185.4 cm)     Body mass index is 31.66 kg/(m^2).  Flowsheet Rows         First Filed Value    Admission Height  73\" (185.4 cm) Documented at 01/12/2017 0102    Admission Weight  245 lb (111 kg) Documented at 01/12/2017 0102          Physical Exam   Constitutional: Oriented to person, place, and time. Well-developed and well-nourished. No distress.   HENT:   Head: Normocephalic.   Eyes: Conjunctivae are normal. Pupils are equal, round, and reactive to light. No scleral icterus.   Neck: Normal carotid pulses, no hepatojugular reflux and no JVD present. Carotid bruit is not present. No tracheal deviation, no edema and no erythema present. No thyromegaly present.   Cardiovascular: Normal rate, regular rhythm, S1 normal, S2 normal, normal heart sounds and intact distal pulses.   No extrasystoles are present. PMI is not displaced.  Exam reveals no gallop, no distant heart sounds and no friction rub.    No murmur heard.  Pulses:       Carotid pulses are 2+ on the right side, and 2+ on the left side.       Radial pulses are 2+ on the right side, and 2+ on the left side.        Femoral pulses are 2+ on the right side, and 2+ on the left side.       Dorsalis pedis pulses are 2+ on the right side, and 2+ on the left side.        Posterior tibial pulses are 2+ on the right side, and 2+ on the left side.   Pulmonary/Chest: Effort normal and breath sounds normal. No respiratory distress. There are no decreased breath " sounds. No wheezes. No rhonchi. No rales. No tenderness.   Abdominal: Soft. Bowel sounds are normal. She exhibits no distension and no mass. There is no hepatosplenomegaly. There is no tenderness. There is no rebound and no guarding.   Musculoskeletal: There is no edema, tenderness or deformity.   Neurological: Alert and oriented to person, place, and time.   Skin: Skin is warm and dry. No rash noted. She is not diaphoretic. No cyanosis or erythema. No pallor. Nails show no clubbing.   Psychiatric: Normal mood and affect. Speech is normal and behavior is normal. Judgment and thought content normal.     Lab Review:                  Results from last 7 days  Lab Units 01/12/17  0110   SODIUM mmol/L 140   POTASSIUM mmol/L 4.0   CHLORIDE mmol/L 100   TOTAL CO2 mmol/L 26.3   BUN mg/dL 16   CREATININE mg/dL 1.08   GLUCOSE mg/dL 234*   CALCIUM mg/dL 9.6       Results from last 7 days  Lab Units 01/12/17  0110   TROPONIN T ng/mL <0.010       Results from last 7 days  Lab Units 01/12/17  0110   WBC 10*3/mm3 8.05   HEMOGLOBIN g/dL 15.7   HEMATOCRIT % 43.6   PLATELETS 10*3/mm3 200                          I personally viewed and interpreted the patient's EKG/Telemetry data    Assessment:   1.  Pleasant 46-year-old gentleman with history of coronary disease previous stents placed in [place] in 2007.  Now with symptoms at least compatible with unstable angina.  His ECG appears to have had an old inferior infarct.  At this time I think be best to proceed with cardiac catheterization he seems agreeable and understands those risks.  2.  History of diabetes mellitus follows with his primary care provider at [facility].  3.  History of hypertension continue home therapy.  4. History of hyperlipidemia he is on atorvastatin will need to increase that dose.        Plan:

## 2017-01-12 NOTE — IP AVS SNAPSHOT
AFTER VISIT SUMMARY             Oswald Savage           About your hospitalization     You were admitted on:  January 12, 2017 You last received care in the:  [facility] CARDIOVASC UNIT       Procedures & Surgeries      Procedure(s) (LRB):  Left ventriculography (N/A)  Left Heart Cath (N/A)  Coronary angiography (N/A)  Functional Flow Reserve     1/12/2017     Surgeon(s):  Diandra Suarez MD  -------------------     Procedure(s) (LRB):  INTRAOPERATIVE PATY, MIDLINE STERNOTOMY, CORONARY ARTERY BYPASS GRAFTING X 5 UTILIZING LEFT INTERNAL MAMMARY ARTERY AND LEFT ENDOSCOPICALLY HARVESTED GREATER SAPHENOUS VEIN (N/A)     1/12/2017 - 1/13/2017     Surgeon(s):  Alexandre Farrell MD  -------------------      Medications    If you or your caregiver advised us that you are currently taking a medication and that medication is marked below as “Resume”, this simply indicates that we have reviewed those medications to make sure our new therapy recommendations do not interfere.  If you have concerns about medications other than those new ones which we are prescribing today, please consult the physician who prescribed them (or your primary physician).  Our review of your home medications is not meant to indicate that we are directing their use.             Your Medications      START taking these medications     amiodarone 400 MG tablet   Take 0.5 tablets by mouth 2 (Two) Times a Day.   Last time this was given:  1/18/2017  9:57 AM   Commonly known as:  PACERONE           aspirin 81 MG EC tablet   Take 1 tablet by mouth Daily.   Last time this was given:  1/18/2017  9:57 AM   Replaces:  aspirin 325 MG tablet           cyclobenzaprine 10 MG tablet   Take 1 tablet by mouth Every 8 (Eight) Hours As Needed for muscle spasms.   Last time this was given:  1/15/2017  2:26 AM   Commonly known as:  FLEXERIL           glucose blood test strip   PRN           insulin detemir 100 UNIT/ML injection   Inject 20 Units under  the skin Every Night.   Last time this was given:  1/17/2017  9:59 PM   Commonly known as:  LEVEMIR           Insulin Pen Needle 32G X 4 MM misc   As directed           metoprolol succinate XL 25 MG 24 hr tablet   Take 1 tablet by mouth Every 12 (Twelve) Hours.   Last time this was given:  1/18/2017  9:57 AM   Commonly known as:  TOPROL-XL           oxyCODONE 10 MG tablet   Take 1 tablet by mouth Every 4 (Four) Hours As Needed for severe pain (7-10) for up to 5 days.   Last time this was given:  1/18/2017  2:09 AM   Commonly known as:  ROXICODONE           pantoprazole 40 MG EC tablet   Take 1 tablet by mouth Daily.   Last time this was given:  1/18/2017  7:58 AM   Commonly known as:  PROTONIX             CONTINUE taking these medications     atorvastatin 20 MG tablet   Take 20 mg by mouth Daily.   Last time this was given:  1/17/2017  9:57 PM   Commonly known as:  LIPITOR           JANUMET  MG per tablet   Take 1 tablet by mouth 2 (Two) Times a Day With Meals.   Generic drug:  sitaGLIPtin-metFORMIN             STOP taking these medications     aspirin 325 MG tablet   Replaced by:  aspirin 81 MG EC tablet           glimepiride 2 MG tablet   Commonly known as:  AMARYL           lisinopril 20 MG tablet   Commonly known as:  PRINIVIL,ZESTRIL           raNITIdine 150 MG tablet   Commonly known as:  ZANTAC                Where to Get Your Medications      These medications were sent to [facility] [address] [place] [address] [address] [facility] - [phone]  - [phone] [address] [address]     Phone:  [phone]     amiodarone 400 MG tablet    aspirin 81 MG EC tablet    cyclobenzaprine 10 MG tablet    glucose blood test strip    insulin detemir 100 UNIT/ML injection    Insulin Pen Needle 32G X 4 MM misc    metoprolol succinate XL 25 MG 24 hr tablet    pantoprazole 40 MG EC tablet         You can get these medications from any pharmacy     Bring a paper prescription for  each of these medications     oxyCODONE 10 MG tablet                  Your Medications      Your Medication List           Morning Noon Evening Bedtime As Needed    amiodarone 400 MG tablet   Take 0.5 tablets by mouth 2 (Two) Times a Day.   Commonly known as:  PACERONE                           NEXT DOSE TONIGHT 1/18           aspirin 81 MG EC tablet   Take 1 tablet by mouth Daily.            NEXT DOSE TOMORROW 1/19                       atorvastatin 20 MG tablet   Take 20 mg by mouth Daily.   Commonly known as:  LIPITOR                        NEXT DOSE TONIGHT 1/18           cyclobenzaprine 10 MG tablet   Take 1 tablet by mouth Every 8 (Eight) Hours As Needed for muscle spasms.   Commonly known as:  FLEXERIL                                   glucose blood test strip   PRN                                insulin detemir 100 UNIT/ML injection   Inject 20 Units under the skin Every Night.   Commonly known as:  LEVEMIR                        NEXT DOSE TONIGHT 1/18           Insulin Pen Needle 32G X 4 MM misc   As directed                                   JANUMET  MG per tablet   Take 1 tablet by mouth 2 (Two) Times a Day With Meals.   Generic drug:  sitaGLIPtin-metFORMIN                           NEXT DOSE TONIGHT 1/18           metoprolol succinate XL 25 MG 24 hr tablet   Take 1 tablet by mouth Every 12 (Twelve) Hours.   Commonly known as:  TOPROL-XL                           NEXT DOSE TONIGHT 1/18           oxyCODONE 10 MG tablet   Take 1 tablet by mouth Every 4 (Four) Hours As Needed for severe pain (7-10) for up to 5 days.   Commonly known as:  ROXICODONE                                   pantoprazole 40 MG EC tablet   Take 1 tablet by mouth Daily.   Commonly known as:  PROTONIX            NEXT DOSE TOMORROW 1/19                                Instructions for After Discharge        Activity Instructions     Discharge Activity Restrictions       1) No driving for 2 weeks and no longer taking narcotics.   2)  May shower   3) Do not lift / push / pull more then 10 lbs.  4) Walk at least 10 minutes at lease 3 times daily             Other Instructions     Discharge Dressing / Wound Instructions       Instructions: Clean incision daily with soap and water, otherwise keep clean and dry. Remove chest tube sutures prior to discharge. Wear GIOVANNI hose daily for 2 weeks, may remove at night.              Discharge References/Attachments     ANGINA PECTORIS, EASY-TO-READ (ENGLISH)    CORONARY ARTERY BYPASS GRAFTING (ENGLISH)    AMIODARONE TABLETS (ENGLISH)    ASPIRIN, ASA ORAL TABLETS (ENGLISH)    CYCLOBENZAPRINE TABLETS (ENGLISH)    INSULIN DETEMIR INJECTION (ENGLISH)    METOPROLOL EXTENDED-RELEASE TABLETS (ENGLISH)    OXYCODONE TABLETS OR CAPSULES (ENGLISH)    PANTOPRAZOLE TABLETS (ENGLISH)    CARDIAC DIET (ENGLISH)    DIABETES MELLITUS AND FOOD (ENGLISH)       Follow-ups for After Discharge        Follow-up Information     Follow up with [facility] .    Specialty:  Home Health Services    Contact information:    [address] [facility] [address]  [facility] [address] [phone]        Follow up with CARLYLE Lima Follow up in 1 week(s).    Specialty:  Cardiology    Contact information:    [address] [facility] [address]  [place] [address]  [phone]          Follow up with Quincy Carolina MD Follow up in 1 month(s).    Specialty:  Cardiology    Contact information:    [address] [facility] [address]  [place] [address]  [phone]          Follow up with Rose Marie Farr MD Follow up in 1 week(s).    Specialty:  Internal Medicine    Contact information:    [address] [place] [id number] [phone]          Follow up with Basilio Sharp MD Follow up in 3 week(s).    Specialty:  Endocrinology    Contact information:    [address] [facility] [address]  [place] [id number]  [phone]          Follow up with Jun Crews MD .    Specialties:  Pulmonary Disease, Sleep Medicine     Why:  Please call with any questions. The office will call you regarding the in home sleep study.    Contact information:    [address]  [facility] [address]  [facility] [id number] [phone]          Follow up with Blossom Farrell MD Follow up in 6 week(s).    Specialty:  Cardiothoracic Surgery    Contact information:    [address]  [facility] [id number] [phone]        Referrals and Follow-ups to Schedule     Follow-Up    As directed    With Dr. Farrell   Follow Up Details:  4-6 weeks       Referral to Home Health    As directed    Face to Face Visit Date:  2017   Follow-up Provider for Plan of Care?:  I will be treating the patient on an ongoing basis.  Please send me the Plan of Care for signature.   Follow-up Provider:  BLOSSOM FARRELL   Reason/Clinical Findings:  s/p cabg   Describe mobility limitations that make leaving home difficult:  s/p cabg   Nursing/Therapeutic Services Requested:  Skilled Nursing   Skilled nursing orders:  Post CABG care   Frequency:  1 Week 1             Scheduled Appointments     2017  9:00 AM [facility]   Hospital Follow Up with CARLYLE Lima   [facility] MEDICAL [facility] CARDIOLOGY (--)    [address]  [facility] [address] [phone]              [employer] Signup     [facility] [employer] allows you to send messages to your doctor, view your test results, renew your prescriptions, schedule appointments, and more. To sign up, go to [employer] and click on the Sign Up Now link in the New User? box. Enter your [employer] Activation Code exactly as it appears below along with the last four digits of your Social Security Number and your Date of Birth () to complete the sign-up process. If you do not sign up before the expiration date, you must request a new code.    [employer] Activation Code: [email or web address]  Expires: 2017  5:50 PM    If you have questions, you can email [email or web address] or call  [phone] to talk to our MyChart staff. Remember, MyChart is NOT to be used for urgent needs. For medical emergencies, dial 911.           Summary of Your Hospitalization        Reason for Hospitalization     Your primary diagnosis was:  Not on File    Your diagnoses also included:  Chest Pain At Rest      Care Providers     Provider Service Role Specialty    Quincy Carolina MD -- Attending Provider Cardiology    Alexandre Farrell MD -- Consulting Physician  Cardiothoracic Surgery    Alexandre Farrell MD -- Surgeon  Cardiothoracic Surgery    Kristina Rogers MD -- Consulting Physician  Endocrinology    Phil Mayberry MD -- Consulting Physician  Pulmonary Disease      Your Allergies  Date Reviewed: 1/13/2017    No active allergies      Patient Belongings Returned     Document Return of Belongings Flowsheet     Were the patient bedside belongings sent home?   Yes   Belongings Retrieved from Security & Sent Home   N/A    Belongings Sent to Safe   --   Medications Retrieved from Pharmacy & Sent Home   N/A              More Information      Angina Pectoris  Angina pectoris is a very bad feeling in the chest, neck, or arm. Your doctor may call it angina. There are four types of angina. Angina is caused by a lack of blood in the middle and thickest layer of the heart wall (myocardium). Angina may feel like a crushing or squeezing pain in the chest. It may feel like tightness or heavy pressure in the chest. Some people say it feels like gas, heartburn, or indigestion. Some people have symptoms other than pain. These include:  · Shortness of breath.  · Cold sweats.  · Feeling sick to your stomach (nausea).  · Feeling light-headed.  Many women have chest discomfort and some of the other symptoms. However, women often have different symptoms, such as:  · Feeling tired (fatigue).  · Feeling nervous for no reason.  · Feeling weak for no reason.  · Dizziness or fainting.  Women may have angina without any symptoms.  HOME  CARE  · Take medicines only as told by your doctor.  · Take care of other health issues as told by your doctor. These include:    High blood pressure (hypertension).    Diabetes.  · Follow a heart-healthy diet. Your doctor can help you to choose healthy food options and make changes.  · Talk to your doctor to learn more about healthy cooking methods and use them. These include:    Roasting.    Grilling.    Broiling.    Baking.    Poaching.    Steaming.    Stir-frying.  · Follow an exercise program approved by your doctor.  · Keep a healthy weight. Lose weight as told by your doctor.  · Rest when you are tired.  · Learn to manage stress.  · Do not use any tobacco, such as cigarettes, chewing tobacco, or electronic cigarettes. If you need help quitting, ask your doctor.  · If you drink alcohol, and your doctor says it is okay, limit yourself to no more than 1 drink per day. One drink equals 12 ounces of beer, 5 ounces of wine, or 1½ ounces of hard liquor.  · Stop illegal drug use.  · Keep all follow-up visits as told by your doctor. This is important.  Do not take these medicines unless your doctor says that you can:  · Nonsteroidal anti-inflammatory drugs (NSAIDs). These include:    Ibuprofen.    Naproxen.    Celecoxib.  · Vitamin supplements that have vitamin A, vitamin E, or both.  · Hormone therapy that contains estrogen with or without progestin.  GET HELP RIGHT AWAY IF:  · You have pain in your chest, neck, arm, jaw, stomach, or back that:    Lasts more than a few minutes.    Comes back.    Does not get better after you take medicine under your tongue (sublingual nitroglycerin).  · You have any of these symptoms for no reason:    Gas, heartburn, or indigestion.    Sweating a lot.    Shortness of breath or trouble breathing.    Feeling sick to your stomach or throwing up.    Feeling more tired than usual.    Feeling nervous or worrying more than usual.    Feeling weak.    Diarrhea.  · You are suddenly dizzy or  light-headed.  · You faint or pass out.  These symptoms may be an emergency. Do not wait to see if the symptoms will go away. Get medical help right away. Call your local emergency services (911 in the [place] Do not drive yourself to the hospital.     This information is not intended to replace advice given to you by your health care provider. Make sure you discuss any questions you have with your health care provider.     Document Released: 06/05/2009 Document Revised: 05/03/2016 Document Reviewed: 04/21/2015  [employer] Interactive Patient Education ©2016 [employer]          Coronary Artery Bypass Grafting  Coronary artery bypass grafting (CABG) is a procedure done to bypass or fix arteries of the heart (coronary arteries) that have become narrow or blocked. This narrowing is usually the result of plaque that has built up in the walls of the vessels. The coronary arteries supply the heart with the oxygen and nutrients it needs to pump blood to your body. In the CABG procedure, a section of blood vessel from another part of the body (usually the leg, arm, or chest wall) is removed and then inserted where it will allow blood to bypass the damaged part of the coronary artery.   LET YOUR HEALTH CARE PROVIDER KNOW ABOUT:  · Any allergies you have.    · All medicines you are taking, including blood thinners, vitamins, herbs, eye drops, creams, and over-the-counter medicines.    · Use of steroids (by mouth or creams).    · Previous problems you or members of your family have had with the use of anesthetics.    · Any blood disorders you have.    · Previous surgeries you have had.    · Medical conditions you have.    RISKS AND COMPLICATIONS  Generally, this is a safe procedure. However, problems can occur and include:   · Blood loss.    · Stroke.    · Infection.    · Pain at the surgical site.    · Heart attack during or after surgery.    · Kidney failure.    BEFORE THE PROCEDURE  · Take medicines only as directed by your  health care provider. You may be asked to start new medicines and stop taking others. Do not stop medicines or adjust dosages on your own.  · Do not eat or drink anything after midnight the night before the procedure or as directed by your health care provider. Ask your health care provider if it is okay to take a sip of water with any needed medicines.  PROCEDURE  The surgeon may use either an open technique or a minimally invasive technique for this surgery.  Traditional open surgery  · You will be given medicine to make you sleep through the procedure (general anesthetic).  · Once you are asleep, a cut (incision) will be made down the front of the chest through the breastbone (sternum). The sternum will be spread open so your heart can be seen.  · You will then be placed on a heart-lung bypass machine. This machine will provide oxygen to your blood while the heart is undergoing surgery.  · Your heart will then be temporarily stopped so that the surgeon can do the next steps.    A section of vein will likely be taken from your leg and used to bypass the blocked arteries of your heart. Sometimes parts of an artery from inside your chest wall or from your arm will be used, either alone or in combination with leg veins.    When the bypasses are done, you will be taken off the machine.    Your heart will be restarted and will take over again normally.    The sac around the heart will be closed.  · Your chest will then be closed with stitches or staples.  · Tubes will remain in your chest and will be connected to a suction device in order to help drain fluid and reinflate the lungs.  Minimally invasive surgery  This technique is done from an incision over your left chest area. If appropriate, your surgeon may not have to slow or stop your heart. If your condition allows for this procedure, there will often be less blood loss, less pain, a shorter hospital stay, and faster recovery compared to traditional open  surgery.  AFTER THE PROCEDURE  · You will be taken to a recovery area to be monitored.  · You may wake up with a tube in your throat to help your breathing. You may be connected to a breathing machine. You will not be able to talk while the tube is in place. The tube will be taken out as soon as it is safe to do so.  · You will be groggy and may have some pain. You will be given pain medicine to help control the pain.     This information is not intended to replace advice given to you by your health care provider. Make sure you discuss any questions you have with your health care provider.     Document Released: 09/27/2006 Document Revised: 01/08/2016 Document Reviewed: 05/27/2014  [employer] Interactive Patient Education ©2016 [employer]          Amiodarone tablets  What is this medicine?  AMIODARONE (a HINA oh da chau) is an antiarrhythmic drug. It helps make your heart beat regularly. Because of the side effects caused by this medicine, it is only used when other medicines have not worked. It is usually used for heartbeat problems that may be life threatening.  This medicine may be used for other purposes; ask your health care provider or pharmacist if you have questions.  What should I tell my health care provider before I take this medicine?  They need to know if you have any of these conditions:  -liver disease  -lung disease  -other heart problems  -thyroid disease  -an unusual or allergic reaction to amiodarone, iodine, other medicines, foods, dyes, or preservatives  -pregnant or trying to get pregnant  -breast-feeding  How should I use this medicine?  Take this medicine by mouth with a glass of water. Follow the directions on the prescription label. You can take this medicine with or without food. However, you should always take it the same way each time. Take your doses at regular intervals. Do not take your medicine more often than directed. Do not stop taking except on the advice of your doctor or health  care professional.  A special MedGuide will be given to you by the pharmacist with each prescription and refill. Be sure to read this information carefully each time.  Talk to your pediatrician regarding the use of this medicine in children. Special care may be needed.  Overdosage: If you think you have taken too much of this medicine contact a poison control center or emergency room at once.  NOTE: This medicine is only for you. Do not share this medicine with others.  What if I miss a dose?  If you miss a dose, take it as soon as you can. If it is almost time for your next dose, take only that dose. Do not take double or extra doses.  What may interact with this medicine?  Do not take this medicine with any of the following medications:  -abarelix  -apomorphine  -arsenic trioxide  -certain antibiotics like erythromycin, gemifloxacin, levofloxacin, pentamidine  -certain medicines for depression like amoxapine, tricyclic antidepressants  -certain medicines for fungal infections like fluconazole, itraconazole, ketoconazole, posaconazole, voriconazole  -certain medicines for irregular heart beat like disopyramide, dofetilide, dronedarone, ibutilide, propafenone, sotalol  -certain medicines for malaria like chloroquine, halofantrine  -cisapride  -droperidol  -haloperidol  -hawthorn  -maprotiline  -methadone  -phenothiazines like chlorpromazine, mesoridazine, thioridazine  -pimozide  -ranolazine  -red yeast rice  -vardenafil  -ziprasidone  This medicine may also interact with the following medications:  -antiviral medicines for HIV or AIDS  -certain medicines for blood pressure, heart disease, irregular heart beat  -certain medicines for cholesterol like atorvastatin, cerivastatin, lovastatin, simvastatin  -certain medicines for hepatitis C like sofosbuvir and ledipasvir; sofosbuvir  -certain medicines for seizures like phenytoin  -certain medicines for thyroid problems  -certain medicines that treat or prevent blood  clots like warfarin  -cholestyramine  -cimetidine  -clopidogrel  -cyclosporine  -dextromethorphan  -diuretics  -fentanyl  -general anesthetics  -grapefruit juice  -lidocaine  -loratadine  -methotrexate  -other medicines that prolong the QT interval (cause an abnormal heart rhythm)  -procainamide  -quinidine  -rifabutin, rifampin, or rifapentine  -Monika's Wort  -trazodone  This list may not describe all possible interactions. Give your health care provider a list of all the medicines, herbs, non-prescription drugs, or dietary supplements you use. Also tell them if you smoke, drink alcohol, or use illegal drugs. Some items may interact with your medicine.  What should I watch for while using this medicine?  Your condition will be monitored closely when you first begin therapy. Often, this drug is first started in a hospital or other monitored health care setting. Once you are on maintenance therapy, visit your doctor or health care professional for regular checks on your progress. Because your condition and use of this medicine carry some risk, it is a good idea to carry an identification card, necklace or bracelet with details of your condition, medications, and doctor or health care professional.  You may get drowsy or dizzy. Do not drive, use machinery, or do anything that needs mental alertness until you know how this medicine affects you. Do not stand or sit up quickly, especially if you are an older patient. This reduces the risk of dizzy or fainting spells.  This medicine can make you more sensitive to the sun. Keep out of the sun. If you cannot avoid being in the sun, wear protective clothing and use sunscreen. Do not use sun lamps or tanning beds/booths.  You should have regular eye exams before and during treatment. Call your doctor if you have blurred vision, see halos, or your eyes become sensitive to light. Your eyes may get dry. It may be helpful to use a lubricating eye solution or artificial tears  solution.  If you are going to have surgery or a procedure that requires contrast dyes, tell your doctor or health care professional that you are taking this medicine.  What side effects may I notice from receiving this medicine?  Side effects that you should report to your doctor or health care professional as soon as possible:  -allergic reactions like skin rash, itching or hives, swelling of the face, lips, or tongue  -blue-gray coloring of the skin  -blurred vision, seeing blue green halos, increased sensitivity of the eyes to light  -breathing problems  -chest pain  -dark urine  -fast, irregular heartbeat  -feeling faint or light-headed  -intolerance to heat or cold  -nausea or vomiting  -pain and swelling of the scrotum  -pain, tingling, numbness in feet, hands  -redness, blistering, peeling or loosening of the skin, including inside the mouth  -spitting up blood  -stomach pain  -sweating  -unusual or uncontrolled movements of body  -unusually weak or tired  -weight gain or loss  -yellowing of the eyes or skin  Side effects that usually do not require medical attention (report to your doctor or health care professional if they continue or are bothersome):  -change in sex drive or performance  -constipation  -dizziness  -headache  -loss of appetite  -trouble sleeping  This list may not describe all possible side effects. Call your doctor for medical advice about side effects. You may report side effects to FDA at 8-224-FDA-7908.  Where should I keep my medicine?  Keep out of the reach of children.  Store at room temperature between 20 and 25 degrees C (68 and 77 degrees F). Protect from light. Keep container tightly closed. Throw away any unused medicine after the expiration date.  NOTE: This sheet is a summary. It may not cover all possible information. If you have questions about this medicine, talk to your doctor, pharmacist, or health care provider.     © 2016, [employer]. (2015-03-23  19:48:11)          Aspirin, ASA oral tablets  What is this medicine?  ASPIRIN (AS pir in) is a pain reliever. It is used to treat mild pain and fever. This medicine is also used as directed by a doctor to prevent and to treat heart attacks, to prevent strokes, and to treat arthritis or inflammation.  This medicine may be used for other purposes; ask your health care provider or pharmacist if you have questions.  What should I tell my health care provider before I take this medicine?  They need to know if you have any of these conditions:  -anemia  -asthma  -bleeding problems  -child with chickenpox, the flu, or other viral infection  -diabetes  -gout  -if you frequently drink alcohol containing drinks  -kidney disease  -liver disease  -low level of vitamin K  -lupus  -smoke tobacco  -stomach ulcers or other problems  -an unusual or allergic reaction to aspirin, tartrazine dye, other medicines, dyes, or preservatives  -pregnant or trying to get pregnant  -breast-feeding  How should I use this medicine?  Take this medicine by mouth with a glass of water. Follow the directions on the package or prescription label. You can take this medicine with or without food. If it upsets your stomach, take it with food. Do not take your medicine more often than directed.  Talk to your pediatrician regarding the use of this medicine in children. While this drug may be prescribed for children as young as 12 years of age for selected conditions, precautions do apply. Children and teenagers should not use this medicine to treat chicken pox or flu symptoms unless directed by a doctor.  Patients over 65 years old may have a stronger reaction and need a smaller dose.  Overdosage: If you think you have taken too much of this medicine contact a poison control center or emergency room at once.  NOTE: This medicine is only for you. Do not share this medicine with others.  What if I miss a dose?  If you are taking this medicine on a regular  schedule and miss a dose, take it as soon as you can. If it is almost time for your next dose, take only that dose. Do not take double or extra doses.  What may interact with this medicine?  Do not take this medicine with any of the following medications:  -cidofovir  -ketorolac  -probenecid  This medicine may also interact with the following medications:  -alcohol  -alendronate  -bismuth subsalicylate  -flavocoxid  -herbal supplements like feverfew, garlic, nish, ginkgo biloba, horse chestnut  -medicines for diabetes or glaucoma like acetazolamide, methazolamide  -medicines for gout  -medicines that treat or prevent blood clots like enoxaparin, heparin, ticlopidine, warfarin  -other aspirin and aspirin-like medicines  -NSAIDs, medicines for pain and inflammation, like ibuprofen or naproxen  -pemetrexed  -sulfinpyrazone  -varicella live vaccine  This list may not describe all possible interactions. Give your health care provider a list of all the medicines, herbs, non-prescription drugs, or dietary supplements you use. Also tell them if you smoke, drink alcohol, or use illegal drugs. Some items may interact with your medicine.  What should I watch for while using this medicine?  If you are treating yourself for pain, tell your doctor or health care professional if the pain lasts more than 10 days, if it gets worse, or if there is a new or different kind of pain. Tell your doctor if you see redness or swelling. Also, check with your doctor if you have a fever that lasts for more than 3 days. Only take this medicine to prevent heart attacks or blood clotting if prescribed by your doctor or health care professional.  Do not take aspirin or aspirin-like medicines with this medicine. Too much aspirin can be dangerous. Always read the labels carefully.  This medicine can irritate your stomach or cause bleeding problems. Do not smoke cigarettes or drink alcohol while taking this medicine. Do not lie down for 30 minutes  after taking this medicine to prevent irritation to your throat.  If you are scheduled for any medical or dental procedure, tell your healthcare provider that you are taking this medicine. You may need to stop taking this medicine before the procedure.  This medicine may be used to treat migraines. If you take migraine medicines for 10 or more days a month, your migraines may get worse. Keep a diary of headache days and medicine use. Contact your healthcare professional if your migraine attacks occur more frequently.  What side effects may I notice from receiving this medicine?  Side effects that you should report to your doctor or health care professional as soon as possible:  -allergic reactions like skin rash, itching or hives, swelling of the face, lips, or tongue  -breathing problems  -changes in hearing, ringing in the ears  -confusion  -general ill feeling or flu-like symptoms  -pain on swallowing  -redness, blistering, peeling or loosening of the skin, including inside the mouth or nose  -signs and symptoms of bleeding such as bloody or black, tarry stools; red or dark-brown urine; spitting up blood or brown material that looks like coffee grounds; red spots on the skin; unusual bruising or bleeding from the eye, gums, or nose  -trouble passing urine or change in the amount of urine  -unusually weak or tired  -yellowing of the eyes or skin  Side effects that usually do not require medical attention (report to your doctor or health care professional if they continue or are bothersome):  -diarrhea or constipation  -headache  -nausea, vomiting  -stomach gas, heartburn  This list may not describe all possible side effects. Call your doctor for medical advice about side effects. You may report side effects to FDA at 4-159-FDA-8304.  Where should I keep my medicine?  Keep out of the reach of children.  Store at room temperature between 15 and 30 degrees C (59 and 86 degrees F). Protect from heat and moisture. Do  not use this medicine if it has a strong vinegar smell. Throw away any unused medicine after the expiration date.  NOTE: This sheet is a summary. It may not cover all possible information. If you have questions about this medicine, talk to your doctor, pharmacist, or health care provider.     © 2016, [employer]. (2014-08-19 11:30:31)          Cyclobenzaprine tablets  What is this medicine?  CYCLOBENZAPRINE (sye kloe ANNAMARIA za preen) is a muscle relaxer. It is used to treat muscle pain, spasms, and stiffness.  This medicine may be used for other purposes; ask your health care provider or pharmacist if you have questions.  What should I tell my health care provider before I take this medicine?  They need to know if you have any of these conditions:  -heart disease, irregular heartbeat, or previous heart attack  -liver disease  -thyroid problem  -an unusual or allergic reaction to cyclobenzaprine, tricyclic antidepressants, lactose, other medicines, foods, dyes, or preservatives  -pregnant or trying to get pregnant  -breast-feeding  How should I use this medicine?  Take this medicine by mouth with a glass of water. Follow the directions on the prescription label. If this medicine upsets your stomach, take it with food or milk. Take your medicine at regular intervals. Do not take it more often than directed.  Talk to your pediatrician regarding the use of this medicine in children. Special care may be needed.  Overdosage: If you think you have taken too much of this medicine contact a poison control center or emergency room at once.  NOTE: This medicine is only for you. Do not share this medicine with others.  What if I miss a dose?  If you miss a dose, take it as soon as you can. If it is almost time for your next dose, take only that dose. Do not take double or extra doses.  What may interact with this medicine?  Do not take this medicine with any of the following medications:  -certain medicines for fungal  infections like fluconazole, itraconazole, ketoconazole, posaconazole, voriconazole  -cisapride  -dofetilide  -dronedarone  -droperidol  -flecainide  -grepafloxacin  -halofantrine  -levomethadyl  -MAOIs like Carbex, Eldepryl, Marplan, Nardil, and Parnate  -nilotinib  -pimozide  -probucol  -sertindole  -thioridazine  -ziprasidone  This medicine may also interact with the following medications:  -abarelix  -alcohol  -certain medicines for cancer  -certain medicines for depression, anxiety, or psychotic disturbances  -certain medicines for infection like alfuzosin, chloroquine, clarithromycin, levofloxacin, mefloquine, pentamidine, troleandomycin  -certain medicines for an irregular heart beat  -certain medicines used for sleep or numbness during surgery or procedure  -contrast dyes  -dolasetron  -guanethidine  -methadone  -octreotide  -ondansetron  -other medicines that prolong the QT interval (cause an abnormal heart rhythm)  -palonosetron  -phenothiazines like chlorpromazine, mesoridazine, prochlorperazine, thioridazine  -tramadol  -vardenafil  This list may not describe all possible interactions. Give your health care provider a list of all the medicines, herbs, non-prescription drugs, or dietary supplements you use. Also tell them if you smoke, drink alcohol, or use illegal drugs. Some items may interact with your medicine.  What should I watch for while using this medicine?  Check with your doctor or health care professional if your condition does not improve within 1 to 3 weeks.  You may get drowsy or dizzy when you first start taking the medicine or change doses. Do not drive, use machinery, or do anything that may be dangerous until you know how the medicine affects you. Stand or sit up slowly.  Your mouth may get dry. Drinking water, chewing sugarless gum, or sucking on hard candy may help.  What side effects may I notice from receiving this medicine?  Side effects that you should report to your doctor or  health care professional as soon as possible:  -allergic reactions like skin rash, itching or hives, swelling of the face, lips, or tongue  -chest pain  -fast heartbeat  -hallucinations  -seizures  -vomiting  Side effects that usually do not require medical attention (report to your doctor or health care professional if they continue or are bothersome):  -headache  This list may not describe all possible side effects. Call your doctor for medical advice about side effects. You may report side effects to FDA at 0-221-FDA-1753.  Where should I keep my medicine?  Keep out of the reach of children.  Store at room temperature between 15 and 30 degrees C (59 and 86 degrees F). Keep container tightly closed. Throw away any unused medicine after the expiration date.  NOTE: This sheet is a summary. It may not cover all possible information. If you have questions about this medicine, talk to your doctor, pharmacist, or health care provider.     © 2016, [employer]. (2014-07-15 12:48:19)          Insulin Detemir injection  What is this medicine?  INSULIN DETEMIR (IN english kenyon DE te cathy) is a human-made form of insulin. This drug lowers the amount of sugar in your blood. It is a long-acting insulin that is usually given once or twice a day.  This medicine may be used for other purposes; ask your health care provider or pharmacist if you have questions.  What should I tell my health care provider before I take this medicine?  They need to know if you have any of these conditions:  -episodes of hypoglycemia  -kidney disease  -liver disease  -an unusual or allergic reaction to insulin, metacresol, other medicines, foods, dyes, or preservatives  -pregnant or trying to get pregnant  -breast-feeding  How should I use this medicine?  This medicine is for injection under the skin. Take this medicine at the same time(s) each day. Use exactly as directed. This insulin should never be mixed in the same syringe with other insulins  before injection. Do not vigorously shake insulin before use. You will be taught how to adjust doses for activities and illness. Do not use more insulin than prescribed. Do not use more or less often than prescribed.  Always check the appearance of your insulin before using it. This medicine should be clear and colorless like water. Do not use if it is cloudy, thickened, colored, or has solid particles in it.  It is important that you put your used needles and syringes in a special sharps container. Do not put them in a trash can. If you do not have a sharps container, call your pharmacist or healthcare provider to get one.  Talk to your pediatrician regarding the use of this medicine in children. While this drug may be prescribed for children as young as 2 years for selected conditions, precautions do apply.  Overdosage: If you think you have taken too much of this medicine contact a poison control center or emergency room at once.  NOTE: This medicine is only for you. Do not share this medicine with others.  What if I miss a dose?  It is important not to miss a dose. Your health care professional or doctor should discuss a plan for missed doses with you. If you do miss a dose, follow their plan. Do not take double doses.  What may interact with this medicine?  -other medicines for diabetes  Many medications may cause an increase or decrease in blood sugar, these include:  -alcohol containing beverages  -aspirin and aspirin-like drugs  -chloramphenicol  -chromium  -diuretics  -female hormones, like estrogens or progestins and birth control pills  -heart medicines  -isoniazid  -MAOIs like Carbex, Eldepryl, Marplan, Nardil, and Parnate  -male hormones or anabolic steroids  -medicines for weight loss  -medicines for allergies, asthma, cold, or cough  -medicines for mental problems  -niacin  -NSAIDs, medicines for pain and inflammation, like ibuprofen or naproxen  -pentamidine  -phenytoin  -probenecid  -quinolone  antibiotics like ciprofloxacin, levofloxacin, ofloxacin  -some herbal dietary supplements  -steroid medicines like prednisone or cortisone  -thyroid medicine  Some medications can hide the warning symptoms of low blood sugar. You may need to monitor your blood sugar more closely if you are taking one of these medications. These include:  -beta-blockers such as atenolol, metoprolol, propranolol  -clonidine  -guanethidine  -reserpine  This list may not describe all possible interactions. Give your health care provider a list of all the medicines, herbs, non-prescription drugs, or dietary supplements you use. Also tell them if you smoke, drink alcohol, or use illegal drugs. Some items may interact with your medicine.  What should I watch for while using this medicine?  Visit your health care professional or doctor for regular checks on your progress.  A test called the HbA1C (A1C) will be monitored. This is a simple blood test. It measures your blood sugar control over the last 2 to 3 months. You will receive this test every 3 to 6 months.  Learn how to check your blood sugar. Learn the symptoms of low and high blood sugar and how to manage them.  Always carry a quick-source of sugar with you in case you have symptoms of low blood sugar. Examples include hard sugar candy or glucose tablets. Make sure others know that you can choke if you eat or drink when you develop serious symptoms of low blood sugar, such as seizures or unconsciousness. They must get medical help at once.  Tell your doctor or health care professional if you have high blood sugar. You might need to change the dose of your medicine. If you are sick or exercising more than usual, you might need to change the dose of your medicine.  Do not skip meals. Ask your doctor or health care professional if you should avoid alcohol. Many nonprescription cough and cold products contain sugar or alcohol. These can affect blood sugar.  Make sure that you have the  right kind of syringe for the type of insulin you use. Try not to change the brand and type of insulin or syringe unless your health care professional or doctor tells you to. Switching insulin brand or type can cause dangerously high or low blood sugar. Always keep an extra supply of insulin, syringes, and needles on hand. Use a syringe one time only. Throw away syringe and needle in a closed container to prevent accidental needle sticks.  Insulin pens and cartridges should never be shared. Even if the needle is changed, sharing may result in passing of viruses like hepatitis or HIV.  Wear a medical ID bracelet or chain, and carry a card that describes your disease and details of your medicine and dosage times.  What side effects may I notice from receiving this medicine?  Side effects that you should report to your health care professional or doctor as soon as possible:  -allergic reactions like skin rash, itching or hives, swelling of the face, lips, or tongue  -breathing problems  -signs and symptoms of high blood sugar such as dizziness, dry mouth, dry skin, fruity breath, nausea, stomach pain, increased hunger or thirst, increased urination  -signs and symptoms of low blood sugar such as feeling anxious, confusion, dizziness, increased hunger, unusually weak or tired, sweating, shakiness, cold, irritable, headache, blurred vision, fast heartbeat, loss of consciousness  Side effects that usually do not require medical attention (report to your health care professional or doctor if they continue or are bothersome):  -increase or decrease in fatty tissue under the skin due to overuse of a particular injection site  -itching, burning, swelling, or rash at site where injected  This list may not describe all possible side effects. Call your doctor for medical advice about side effects. You may report side effects to FDA at 4-849-FDA-5241.  Where should I keep my medicine?  Keep out of the reach of children.  Store  Flextouch Pens in a refrigerator between 2 and 8 degrees C (36 and 46 degrees F.) Do not freeze or use if the insulin has been frozen. Once opened, the pens should be kept at room temperature, below 30 degrees C (86 degrees F). Do not store in the refrigerator once opened. Once opened, the insulin can be used for 42 days. After 42 days, the Flextouch Pen should be thrown away.  Store unopened insulin vials in a refrigerator between 2 and 8 degrees C (36 and 46 degrees F). Do not freeze or use if the insulin has been frozen. Opened vials (vials currently in use) should be stored in a refrigerator, never a freezer. If refrigeration is not possible, the opened vial can be stored unrefrigerated at room temperature, below 30 degrees C (86 degrees F) for up to 42 days. After 42 days, the vial of insulin should be thrown away. Keeping your insulin at room temperature decreases the amount of pain during injection.  Protect from light and excessive heat. Throw away any unused medicine after the expiration date or after the specified time for room temperature storage has passed.  NOTE: This sheet is a summary. It may not cover all possible information. If you have questions about this medicine, talk to your doctor, pharmacist, or health care provider.     © 2016, [employer]. (2015-02-26 10:14:51)          Metoprolol extended-release tablets  What is this medicine?  METOPROLOL (me TOE proe lole) is a beta-blocker. Beta-blockers reduce the workload on the heart and help it to beat more regularly. This medicine is used to treat high blood pressure and to prevent chest pain. It is also used to after a heart attack and to prevent an additional heart attack from occurring.  This medicine may be used for other purposes; ask your health care provider or pharmacist if you have questions.  What should I tell my health care provider before I take this medicine?  They need to know if you have any of these  conditions:  -diabetes  -heart or vessel disease like slow heart rate, worsening heart failure, heart block, sick sinus syndrome or Raynaud's disease  -kidney disease  -liver disease  -lung or breathing disease, like asthma or emphysema  -pheochromocytoma  -thyroid disease  -an unusual or allergic reaction to metoprolol, other beta-blockers, medicines, foods, dyes, or preservatives  -pregnant or trying to get pregnant  -breast-feeding  How should I use this medicine?  Take this medicine by mouth with a glass of water. Follow the directions on the prescription label. Do not crush or chew. Take this medicine with or immediately after meals. Take your doses at regular intervals. Do not take more medicine than directed. Do not stop taking this medicine suddenly. This could lead to serious heart-related effects.  Talk to your pediatrician regarding the use of this medicine in children. While this drug may be prescribed for children as young as 6 years for selected conditions, precautions do apply.  Overdosage: If you think you have taken too much of this medicine contact a poison control center or emergency room at once.  NOTE: This medicine is only for you. Do not share this medicine with others.  What if I miss a dose?  If you miss a dose, take it as soon as you can. If it is almost time for your next dose, take only that dose. Do not take double or extra doses.  What may interact with this medicine?  This medicine may interact with the following medications:  -certain medicines for blood pressure, heart disease, irregular heart beat  -certain medicines for depression, like monoamine oxidase (MAO) inhibitors, fluoxetine, or paroxetine  -clonidine  -dobutamine  -epinephrine  -isoproterenol  -reserpine  This list may not describe all possible interactions. Give your health care provider a list of all the medicines, herbs, non-prescription drugs, or dietary supplements you use. Also tell them if you smoke, drink alcohol,  or use illegal drugs. Some items may interact with your medicine.  What should I watch for while using this medicine?  Visit your doctor or health care professional for regular check ups. Contact your doctor right away if your symptoms worsen. Check your blood pressure and pulse rate regularly. Ask your health care professional what your blood pressure and pulse rate should be, and when you should contact them.  You may get drowsy or dizzy. Do not drive, use machinery, or do anything that needs mental alertness until you know how this medicine affects you. Do not sit or stand up quickly, especially if you are an older patient. This reduces the risk of dizzy or fainting spells. Contact your doctor if these symptoms continue. Alcohol may interfere with the effect of this medicine. Avoid alcoholic drinks.  What side effects may I notice from receiving this medicine?  Side effects that you should report to your doctor or health care professional as soon as possible:  -allergic reactions like skin rash, itching or hives  -cold or numb hands or feet  -depression  -difficulty breathing  -faint  -fever with sore throat  -irregular heartbeat, chest pain  -rapid weight gain  -swollen legs or ankles  Side effects that usually do not require medical attention (report to your doctor or health care professional if they continue or are bothersome):  -anxiety or nervousness  -change in sex drive or performance  -dry skin  -headache  -nightmares or trouble sleeping  -short term memory loss  -stomach upset or diarrhea  -unusually tired  This list may not describe all possible side effects. Call your doctor for medical advice about side effects. You may report side effects to FDA at 8-641-FDA-4581.  Where should I keep my medicine?  Keep out of the reach of children.  Store at room temperature between 15 and 30 degrees C (59 and 86 degrees F). Throw away any unused medicine after the expiration date.  NOTE: This sheet is a summary. It  may not cover all possible information. If you have questions about this medicine, talk to your doctor, pharmacist, or health care provider.     © 2016, Elsevier/Gold Standard. (2014-08-22 14:41:37)          Oxycodone tablets or capsules  What is this medicine?  OXYCODONE (ox i KOKIM done) is a pain reliever. It is used to treat moderate to severe pain.  This medicine may be used for other purposes; ask your health care provider or pharmacist if you have questions.  What should I tell my health care provider before I take this medicine?  They need to know if you have any of these conditions:  -Mukund's disease  -brain tumor  -head injury  -heart disease  -history of drug or alcohol abuse problem  -if you often drink alcohol  -kidney disease  -liver disease  -lung or breathing disease, like asthma  -mental illness  -pancreatic disease  -seizures  -thyroid disease  -an unusual or allergic reaction to oxycodone, codeine, hydrocodone, morphine, other medicines, foods, dyes, or preservatives  -pregnant or trying to get pregnant  -breast-feeding  How should I use this medicine?  Take this medicine by mouth with a glass of water. Follow the directions on the prescription label. You can take it with or without food. If it upsets your stomach, take it with food. Take your medicine at regular intervals. Do not take it more often than directed. Do not stop taking except on your doctor's advice.  Some brands of this medicine, like Oxecta, have special instructions. Ask your doctor or pharmacist if these directions are for you: Do not cut, crush or chew this medicine. Swallow only one tablet at a time. Do not wet, soak, or lick the tablet before you take it.  Talk to your pediatrician regarding the use of this medicine in children. Special care may be needed.  Overdosage: If you think you have taken too much of this medicine contact a poison control center or emergency room at once.  NOTE: This medicine is only for you. Do not  share this medicine with others.  What if I miss a dose?  If you miss a dose, take it as soon as you can. If it is almost time for your next dose, take only that dose. Do not take double or extra doses.  What may interact with this medicine?  -alcohol  -antihistamines  -certain medicines used for nausea like chlorpromazine, droperidol  -erythromycin  -ketoconazole  -medicines for depression, anxiety, or psychotic disturbances  -medicines for sleep  -muscle relaxants  -naloxone  -naltrexone  -narcotic medicines (opiates) for pain  -nilotinib  -phenobarbital  -phenytoin  -rifampin  -ritonavir  -voriconazole  This list may not describe all possible interactions. Give your health care provider a list of all the medicines, herbs, non-prescription drugs, or dietary supplements you use. Also tell them if you smoke, drink alcohol, or use illegal drugs. Some items may interact with your medicine.  What should I watch for while using this medicine?  Tell your doctor or health care professional if your pain does not go away, if it gets worse, or if you have new or a different type of pain. You may develop tolerance to the medicine. Tolerance means that you will need a higher dose of the medicine for pain relief. Tolerance is normal and is expected if you take this medicine for a long time.  Do not suddenly stop taking your medicine because you may develop a severe reaction. Your body becomes used to the medicine. This does NOT mean you are addicted. Addiction is a behavior related to getting and using a drug for a non-medical reason. If you have pain, you have a medical reason to take pain medicine. Your doctor will tell you how much medicine to take. If your doctor wants you to stop the medicine, the dose will be slowly lowered over time to avoid any side effects.  You may get drowsy or dizzy when you first start taking this medicine or change doses. Do not drive, use machinery, or do anything that may be dangerous until you  know how the medicine affects you. Stand or sit up slowly.  There are different types of narcotic medicines (opiates) for pain. If you take more than one type at the same time, you may have more side effects. Give your health care provider a list of all medicines you use. Your doctor will tell you how much medicine to take. Do not take more medicine than directed. Call emergency for help if you have problems breathing.  This medicine will cause constipation. Try to have a bowel movement at least every 2 to 3 days. If you do not have a bowel movement for 3 days, call your doctor or health care professional.  Your mouth may get dry. Drinking water, chewing sugarless gum, or sucking on hard candy may help. See your dentist every 6 months.  What side effects may I notice from receiving this medicine?  Side effects that you should report to your doctor or health care professional as soon as possible:  -allergic reactions like skin rash, itching or hives, swelling of the face, lips, or tongue  -breathing problems  -confusion  -feeling faint or lightheaded, falls  -trouble passing urine or change in the amount of urine  -unusually weak or tired  Side effects that usually do not require medical attention (report to your doctor or health care professional if they continue or are bothersome):  -constipation  -dry mouth  -itching  -nausea, vomiting  -upset stomach  This list may not describe all possible side effects. Call your doctor for medical advice about side effects. You may report side effects to FDA at 8-389-FDA-0829.  Where should I keep my medicine?  Keep out of the reach of children. This medicine can be abused. Keep your medicine in a safe place to protect it from theft. Do not share this medicine with anyone. Selling or giving away this medicine is dangerous and against the law.  Store at room temperature between 15 and 30 degrees C (59 and 86 degrees F). Protect from light. Keep container tightly closed.  This  medicine may cause accidental overdose and death if it is taken by other adults, children, or pets. Flush any unused medicine down the toilet to reduce the chance of harm. Do not use the medicine after the expiration date.  NOTE: This sheet is a summary. It may not cover all possible information. If you have questions about this medicine, talk to your doctor, pharmacist, or health care provider.     © 2016, [employer]Gold Standard. (2016-04-30 01:15:14)          Pantoprazole tablets  What is this medicine?  PANTOPRAZOLE (pan TOE pra zole) prevents the production of acid in the stomach. It is used to treat gastroesophageal reflux disease (GERD), inflammation of the esophagus, and Zollinger-Taylor syndrome.  This medicine may be used for other purposes; ask your health care provider or pharmacist if you have questions.  What should I tell my health care provider before I take this medicine?  They need to know if you have any of these conditions:  -liver disease  -low levels of magnesium in the blood  -an unusual or allergic reaction to omeprazole, lansoprazole, pantoprazole, rabeprazole, other medicines, foods, dyes, or preservatives  -pregnant or trying to get pregnant  -breast-feeding  How should I use this medicine?  Take this medicine by mouth. Swallow the tablets whole with a drink of water. Follow the directions on the prescription label. Do not crush, break, or chew. Take your medicine at regular intervals. Do not take your medicine more often than directed.  Talk to your pediatrician regarding the use of this medicine in children. While this drug may be prescribed for children as young as 5 years for selected conditions, precautions do apply.  Overdosage: If you think you have taken too much of this medicine contact a poison control center or emergency room at once.  NOTE: This medicine is only for you. Do not share this medicine with others.  What if I miss a dose?  If you miss a dose, take it as soon as you  can. If it is almost time for your next dose, take only that dose. Do not take double or extra doses.  What may interact with this medicine?  Do not take this medicine with any of the following medications:  -atazanavir  -nelfinavir  This medicine may also interact with the following medications:  -ampicillin  -delavirdine  -erlotinib  -iron salts  -medicines for fungal infections like ketoconazole, itraconazole and voriconazole  -methotrexate  -mycophenolate mofetil  -warfarin  This list may not describe all possible interactions. Give your health care provider a list of all the medicines, herbs, non-prescription drugs, or dietary supplements you use. Also tell them if you smoke, drink alcohol, or use illegal drugs. Some items may interact with your medicine.  What should I watch for while using this medicine?  It can take several days before your stomach pain gets better. Check with your doctor or health care professional if your condition does not start to get better, or if it gets worse.  You may need blood work done while you are taking this medicine.  What side effects may I notice from receiving this medicine?  Side effects that you should report to your doctor or health care professional as soon as possible:  -allergic reactions like skin rash, itching or hives, swelling of the face, lips, or tongue  -bone, muscle or joint pain  -breathing problems  -chest pain or chest tightness  -dark yellow or brown urine  -dizziness  -fast, irregular heartbeat  -feeling faint or lightheaded  -fever or sore throat  -muscle spasm  -palpitations  -redness, blistering, peeling or loosening of the skin, including inside the mouth  -seizures  -tremors  -unusual bleeding or bruising  -unusually weak or tired  -yellowing of the eyes or skin  Side effects that usually do not require medical attention (Report these to your doctor or health care professional if they continue or are bothersome.):  -constipation  -diarrhea  -dry  mouth  -headache  -nausea  This list may not describe all possible side effects. Call your doctor for medical advice about side effects. You may report side effects to FDA at 1-792-FDR-0803.  Where should I keep my medicine?  Keep out of the reach of children.  Store at room temperature between 15 and 30 degrees C (59 and 86 degrees F). Protect from light and moisture. Throw away any unused medicine after the expiration date.  NOTE: This sheet is a summary. It may not cover all possible information. If you have questions about this medicine, talk to your doctor, pharmacist, or health care provider.     © 2016, Elsevier/Gold Standard. (2016-02-05 14:45:56)          Heart-Healthy Eating Plan  Many factors influence your heart health, including eating and exercise habits. Heart (coronary) risk increases with abnormal blood fat (lipid) levels. Heart-healthy meal planning includes limiting unhealthy fats, increasing healthy fats, and making other small dietary changes. This includes maintaining a healthy body weight to help keep lipid levels within a normal range.  WHAT IS MY PLAN?   Your health care provider recommends that you:  · Get no more than _________% of the total calories in your daily diet from fat.  · Limit your intake of saturated fat to less than _________% of your total calories each day.  · Limit the amount of cholesterol in your diet to less than _________ mg per day.  WHAT TYPES OF FAT SHOULD I CHOOSE?  · Choose healthy fats more often. Choose monounsaturated and polyunsaturated fats, such as olive oil and canola oil, flaxseeds, walnuts, almonds, and seeds.  · Eat more omega-3 fats. Good choices include salmon, mackerel, sardines, tuna, flaxseed oil, and ground flaxseeds. Aim to eat fish at least two times each week.  · Limit saturated fats. Saturated fats are primarily found in animal products, such as meats, butter, and cream. Plant sources of saturated fats include palm oil, palm kernel oil, and  "coconut oil.  · Avoid foods with partially hydrogenated oils in them. These contain trans fats. Examples of foods that contain trans fats are stick margarine, some tub margarines, cookies, crackers, and other baked goods.  WHAT GENERAL GUIDELINES DO I NEED TO FOLLOW?  · Check food labels carefully to identify foods with trans fats or high amounts of saturated fat.  · Fill one half of your plate with vegetables and green salads. Eat 4-5 servings of vegetables per day. A serving of vegetables equals 1 cup of raw leafy vegetables, ½ cup of raw or cooked cut-up vegetables, or ½ cup of vegetable juice.  · Fill one fourth of your plate with whole grains. Look for the word \"whole\" as the first word in the ingredient list.  · Fill one fourth of your plate with lean protein foods.  · Eat 4-5 servings of fruit per day. A serving of fruit equals one medium whole fruit, ¼ cup of dried fruit, ½ cup of fresh, frozen, or canned fruit, or ½ cup of 100% fruit juice.  · Eat more foods that contain soluble fiber. Examples of foods that contain this type of fiber are apples, broccoli, carrots, beans, peas, and barley. Aim to get 20-30 g of fiber per day.  · Eat more home-cooked food and less restaurant, buffet, and fast food.  · Limit or avoid alcohol.  · Limit foods that are high in starch and sugar.  · Avoid fried foods.  · Cook foods by using methods other than frying. Baking, boiling, grilling, and broiling are all great options. Other fat-reducing suggestions include:    Removing the skin from poultry.    Removing all visible fats from meats.    Skimming the fat off of stews, soups, and gravies before serving them.    Steaming vegetables in water or broth.  · Lose weight if you are overweight. Losing just 5-10% of your initial body weight can help your overall health and prevent diseases such as diabetes and heart disease.  · Increase your consumption of nuts, legumes, and seeds to 4-5 servings per week. One serving of dried beans " or legumes equals ½ cup after being cooked, one serving of nuts equals 1½ ounces, and one serving of seeds equals ½ ounce or 1 tablespoon.  · You may need to monitor your salt (sodium) intake, especially if you have high blood pressure. Talk with your health care provider or dietitian to get more information about reducing sodium.  WHAT FOODS CAN I EAT?  Grains  Breads, including Georgian, white, kory, wheat, raisin, rye, oatmeal, and Italian. Tortillas that are neither fried nor made with lard or trans fat. Low-fat rolls, including hotdog and hamburger buns and English muffins. Biscuits. Muffins. Waffles. Pancakes. Light popcorn. Whole-grain cereals. Flatbread. Ewing toast. Pretzels. Breadsticks. Rusks. Low-fat snacks and crackers, including oyster, saltine, matzo, sheryl, animal, and rye. Rice and pasta, including brown rice and those that are made with whole wheat.  Vegetables  All vegetables.  Fruits  All fruits, but limit coconut.  Meats and Other Protein Sources  Lean, well-trimmed beef, veal, pork, and lamb. Chicken and turkey without skin. All fish and shellfish. Wild duck, rabbit, pheasant, and venison. Egg whites or low-cholesterol egg substitutes. Dried beans, peas, lentils, and tofu. Seeds and most nuts.  Dairy  Low-fat or nonfat cheeses, including ricotta, string, and mozzarella. Skim or 1% milk that is liquid, powdered, or evaporated. Buttermilk that is made with low-fat milk. Nonfat or low-fat yogurt.  Beverages  Mineral water. Diet carbonated beverages.  Sweets and Desserts  Sherbets and fruit ices. Honey, jam, marmalade, jelly, and syrups. Meringues and gelatins. Pure sugar candy, such as hard candy, jelly beans, gumdrops, mints, marshmallows, and small amounts of dark chocolate. Jem food cake.  Eat all sweets and desserts in moderation.  Fats and Oils  Nonhydrogenated (trans-free) margarines. Vegetable oils, including soybean, sesame, sunflower, olive, peanut, safflower, corn, canola, and  cottonseed. Salad dressings or mayonnaise that are made with a vegetable oil. Limit added fats and oils that you use for cooking, baking, salads, and as spreads.  Other  Cocoa powder. Coffee and tea. All seasonings and condiments.  The items listed above may not be a complete list of recommended foods or beverages. Contact your dietitian for more options.  WHAT FOODS ARE NOT RECOMMENDED?  Grains  Breads that are made with saturated or trans fats, oils, or whole milk. Croissants. Butter rolls. Cheese breads. Sweet rolls. Donuts. Buttered popcorn. Chow mein noodles. High-fat crackers, such as cheese or butter crackers.  Meats and Other Protein Sources  Fatty meats, such as hotdogs, short ribs, sausage, spareribs, green, ribeye roast or steak, and mutton. High-fat deli meats, such as salami and bologna. Caviar. Domestic duck and goose. Organ meats, such as kidney, liver, sweetbreads, brains, gizzard, chitterlings, and heart.  Dairy  Cream, sour cream, cream cheese, and creamed cottage cheese. Whole milk cheeses, including blue (veronica), [place], Brie, Kris, American, Havarti, Swiss, cheddar, Camembert, and [place].  Whole or 2% milk that is liquid, evaporated, or condensed. Whole buttermilk. Cream sauce or high-fat cheese sauce. Yogurt that is made from whole milk.  Beverages  Regular sodas and drinks with added sugar.  Sweets and Desserts  Frosting. Pudding. Cookies. Cakes other than corby food cake. Candy that has milk chocolate or white chocolate, hydrogenated fat, butter, coconut, or unknown ingredients. Buttered syrups. Full-fat ice cream or ice cream drinks.  Fats and Oils  Gravy that has suet, meat fat, or shortening. Cocoa butter, hydrogenated oils, palm oil, coconut oil, palm kernel oil. These can often be found in baked products, candy, fried foods, nondairy creamers, and whipped toppings. Solid fats and shortenings, including green fat, salt pork, lard, and butter. Nondairy cream substitutes, such as  coffee creamers and sour cream substitutes. Salad dressings that are made of unknown oils, cheese, or sour cream.  The items listed above may not be a complete list of foods and beverages to avoid. Contact your dietitian for more information.     This information is not intended to replace advice given to you by your health care provider. Make sure you discuss any questions you have with your health care provider.     Document Released: 09/26/2009 Document Revised: 01/08/2016 Document Reviewed: 06/11/2015  [employer] Interactive Patient Education ©2016 [employer]          Diabetes Mellitus and Food  It is important for you to manage your blood sugar (glucose) level. Your blood glucose level can be greatly affected by what you eat. Eating healthier foods in the appropriate amounts throughout the day at about the same time each day will help you control your blood glucose level. It can also help slow or prevent worsening of your diabetes mellitus. Healthy eating may even help you improve the level of your blood pressure and reach or maintain a healthy weight.   General recommendations for healthful eating and cooking habits include:  · Eating meals and snacks regularly. Avoid going long periods of time without eating to lose weight.  · Eating a diet that consists mainly of plant-based foods, such as fruits, vegetables, nuts, legumes, and whole grains.  · Using low-heat cooking methods, such as baking, instead of high-heat cooking methods, such as deep frying.  Work with your dietitian to make sure you understand how to use the Nutrition Facts information on food labels.  HOW CAN FOOD AFFECT ME?  Carbohydrates  Carbohydrates affect your blood glucose level more than any other type of food. Your dietitian will help you determine how many carbohydrates to eat at each meal and teach you how to count carbohydrates. Counting carbohydrates is important to keep your blood glucose at a healthy level, especially if you are  using insulin or taking certain medicines for diabetes mellitus.  Alcohol  Alcohol can cause sudden decreases in blood glucose (hypoglycemia), especially if you use insulin or take certain medicines for diabetes mellitus. Hypoglycemia can be a life-threatening condition. Symptoms of hypoglycemia (sleepiness, dizziness, and disorientation) are similar to symptoms of having too much alcohol.   If your health care provider has given you approval to drink alcohol, do so in moderation and use the following guidelines:  · Women should not have more than one drink per day, and men should not have more than two drinks per day. One drink is equal to:    12 oz of beer.    5 oz of wine.    1½ oz of hard liquor.  · Do not drink on an empty stomach.  · Keep yourself hydrated. Have water, diet soda, or unsweetened iced tea.  · Regular soda, juice, and other mixers might contain a lot of carbohydrates and should be counted.  WHAT FOODS ARE NOT RECOMMENDED?  As you make food choices, it is important to remember that all foods are not the same. Some foods have fewer nutrients per serving than other foods, even though they might have the same number of calories or carbohydrates. It is difficult to get your body what it needs when you eat foods with fewer nutrients. Examples of foods that you should avoid that are high in calories and carbohydrates but low in nutrients include:  · Trans fats (most processed foods list trans fats on the Nutrition Facts label).  · Regular soda.  · Juice.  · Candy.  · Sweets, such as cake, pie, doughnuts, and cookies.  · Fried foods.  WHAT FOODS CAN I EAT?  Eat nutrient-rich foods, which will nourish your body and keep you healthy. The food you should eat also will depend on several factors, including:  · The calories you need.  · The medicines you take.  · Your weight.  · Your blood glucose level.  · Your blood pressure level.  · Your cholesterol level.  You should eat a variety of foods,  including:  · Protein.    Lean cuts of meat.    Proteins low in saturated fats, such as fish, egg whites, and beans. Avoid processed meats.  · Fruits and vegetables.    Fruits and vegetables that may help control blood glucose levels, such as apples, mangoes, and yams.  · Dairy products.    Choose fat-free or low-fat dairy products, such as milk, yogurt, and cheese.  · Grains, bread, pasta, and rice.    Choose whole grain products, such as multigrain bread, whole oats, and brown rice. These foods may help control blood pressure.  · Fats.    Foods containing healthful fats, such as nuts, avocado, olive oil, canola oil, and fish.  DOES EVERYONE WITH DIABETES MELLITUS HAVE THE SAME MEAL PLAN?  Because every person with diabetes mellitus is different, there is not one meal plan that works for everyone. It is very important that you meet with a dietitian who will help you create a meal plan that is just right for you.     This information is not intended to replace advice given to you by your health care provider. Make sure you discuss any questions you have with your health care provider.     Document Released: 09/14/2006 Document Revised: 01/08/2016 Document Reviewed: 11/14/2014  [employer] Interactive Patient Education ©2016 [employer]         PREVENTING SURGICAL SITE INFECTIONS     Surgical Site Infections FAQs  What is a Surgical Site Infection (SSI)?  A surgical site infection is an infection that occurs after surgery in the part of the body where the surgery took place. Most patients who have surgery do not develop an infection. However, infections develop in about 1 to 3 out of every 100 patients who have surgery.  Some of the common symptoms of a surgical site infection are:  · Redness and pain around the area where you had surgery  · Drainage of cloudy fluid from your surgical wound  · Fever  Can SSIs be treated?  Yes. Most surgical site infections can be treated with antibiotics. The antibiotic given to you  depends on the bacteria (germs) causing the infection. Sometimes patients with SSIs also need another surgery to treat the infection.  What are some of the things that hospitals are doing to prevent SSIs?  To prevent SSIs, doctors, nurses, and other healthcare providers:  · Clean their hands and arms up to their elbows with an antiseptic agent just before the surgery.  · Clean their hands with soap and water or an alcohol-based hand rub before and after caring for each patient.  · May remove some of your hair immediately before your surgery using electric clippers if the hair is in the same area where the procedure will occur. They should not shave you with a razor.  · Wear special hair covers, masks, gowns, and gloves during surgery to keep the surgery area clean.  · Give you antibiotics before your surgery starts. In most cases, you should get antibiotics within 60 minutes before the surgery starts and the antibiotics should be stopped within 24 hours after surgery.  · Clean the skin at the site of your surgery with a special soap that kills germs.  What can I do to help prevent SSIs?  Before your surgery:  · Tell your doctor about other medical problems you may have. Health problems such as allergies, diabetes, and obesity could affect your surgery and your treatment.  · Quit smoking. Patients who smoke get more infections. Talk to your doctor about how you can quit before your surgery.  · Do not shave near where you will have surgery. Shaving with a razor can irritate your skin and make it easier to develop an infection.  At the time of your surgery:  · Speak up if someone tries to shave you with a razor before surgery. Ask why you need to be shaved and talk with your surgeon if you have any concerns.  · Ask if you will get antibiotics before surgery.  After your surgery:  · Make sure that your healthcare providers clean their hands before examining you, either with soap and water or an alcohol-based hand rub.     If you do not see your providers clean their hands, please ask them to do so.  · Family and friends who visit you should not touch the surgical wound or dressings.  · Family and friends should clean their hands with soap and water or an alcohol-based hand rub before and after visiting you. If you do not see them clean their hands, ask them to clean their hands.  What do I need to do when I go home from the hospital?  · Before you go home, your doctor or nurse should explain everything you need to know about taking care of your wound. Make sure you understand how to care for your wound before you leave the hospital.  · Always clean your hands before and after caring for your wound.  · Before you go home, make sure you know who to contact if you have questions or problems after you get home.  · If you have any symptoms of an infection, such as redness and pain at the surgery site, drainage, or fever, call your doctor immediately.  If you have additional questions, please ask your doctor or nurse.  Developed and co-sponsored by The Society for Healthcare Epidemiology of [place] (SHEA); Infectious Diseases Society of [place] (IDSA); American Hospital Association; Association for Professionals in Infection Control and Epidemiology (APIC); Centers for Disease Control and Prevention (CDC); and The Joint Commission.     This information is not intended to replace advice given to you by your health care provider. Make sure you discuss any questions you have with your health care provider.     Document Released: 12/23/2014 Document Revised: 01/08/2016 Document Reviewed: 03/02/2016  Robert Applebaum MD Interactive Patient Education ©2016 [employer]             SYMPTOMS OF A STROKE    Call 911 or have someone take you to the Emergency Department if you have any of the following:    · Sudden numbness or weakness of your face, arm or leg especially on one side of the body  · Sudden confusion, diffiiculty speaking or trouble  understanding   · Changes in your vision or loss of sight in one eye  · Sudden severe headache with no known cause  · sudden dizziness, trouble walking, loss of balance or coordination    It is important to seek emergency care right away if you have further stroke symptoms. If you get emergency help quickly, the powerful clot-dissolving medicines can reduce the disabilities caused by a stroke.     For more information:    American Stroke Association  9-273-3-STROKE  www.strokeassociation.org           IF YOU SMOKE OR USE TOBACCO PLEASE READ THE FOLLOWING:    Why is smoking bad for me?  Smoking increases the risk of heart disease, lung disease, vascular disease, stroke, and cancer.     If you smoke, STOP!    If you would like more information on quitting smoking, please visit the [employer] website: [email or web address]   This link will provide additional resources including the QUIT line and the [employer] support groups.     For more information:    American Cancer Society  ([phone] 442-4431    American Heart Association  1-980.866.2944               YOU ARE THE MOST IMPORTANT FACTOR IN YOUR RECOVERY.     Follow all instructions carefully.     I have reviewed my discharge instructions with my nurse, including the following information, if applicable:     Information about my illness and diagnosis   Follow up appointments (including lab draws)   Wound Care   Equipment Needs   Medications (new and continuing) along with side effects   Preventative information such as vaccines and smoking cessations   Diet   Pain   I know when to contact my Doctor's office or seek emergency care      I want my nurse to describe the side effects of my medications: YES NO   If the answer is no, I understand the side effects of my medications: YES NO   My nurse described the side effects of my medications in a way that I could understand: YES NO   I have taken my personal belongings and my  own medications with me at discharge: YES NO            I have received this information and my questions have been answered. I have discussed any concerns I see with this plan with the nurse or physician. I understand these instructions.    Signature of Patient or Responsible Person: _____________________________________    Date: _________________  Time: __________________    Signature of Healthcare Provider: _______________________________________  Date: _________________  Time: __________________

## 2017-01-13 ENCOUNTER — APPOINTMENT (OUTPATIENT)
Dept: GENERAL RADIOLOGY | Facility: HOSPITAL | Age: 47
End: 2017-01-13

## 2017-01-13 ENCOUNTER — APPOINTMENT (OUTPATIENT)
Dept: PERIOP | Facility: HOSPITAL | Age: 47
End: 2017-01-13
Attending: THORACIC SURGERY (CARDIOTHORACIC VASCULAR SURGERY)

## 2017-01-13 ENCOUNTER — ANESTHESIA (OUTPATIENT)
Dept: PERIOP | Facility: HOSPITAL | Age: 47
End: 2017-01-13

## 2017-01-13 LAB
ACT BLD: 100 SECONDS (ref 82–152)
ACT BLD: 131 SECONDS (ref 82–152)
ACT BLD: 250 SECONDS (ref 82–152)
ACT BLD: 332 SECONDS (ref 82–152)
ACT BLD: 353 SECONDS (ref 82–152)
ACT BLD: 358 SECONDS (ref 82–152)
ALBUMIN SERPL-MCNC: 4.4 G/DL (ref 3.5–5.2)
ALBUMIN SERPL-MCNC: 4.6 G/DL (ref 3.5–5.2)
ANION GAP SERPL CALCULATED.3IONS-SCNC: 13.3 MMOL/L
ANION GAP SERPL CALCULATED.3IONS-SCNC: 13.7 MMOL/L
ANION GAP SERPL CALCULATED.3IONS-SCNC: 14.4 MMOL/L
APTT PPP: 25.7 SECONDS (ref 22.7–35.4)
ARTERIAL PATENCY WRIST A: ABNORMAL
ATMOSPHERIC PRESS: 763.1 MMHG
ATMOSPHERIC PRESS: 765.5 MMHG
ATMOSPHERIC PRESS: 765.7 MMHG
ATMOSPHERIC PRESS: 766.2 MMHG
BASE EXCESS BLDA CALC-SCNC: -0.6 MMOL/L (ref 0–2)
BASE EXCESS BLDA CALC-SCNC: -0.7 MMOL/L (ref 0–2)
BASE EXCESS BLDA CALC-SCNC: 0 MMOL/L (ref -5–5)
BASE EXCESS BLDA CALC-SCNC: 0 MMOL/L (ref -5–5)
BASE EXCESS BLDA CALC-SCNC: 1.1 MMOL/L (ref 0–2)
BASE EXCESS BLDA CALC-SCNC: 1.9 MMOL/L (ref 0–2)
BASE EXCESS BLDA CALC-SCNC: 2 MMOL/L (ref -5–5)
BASE EXCESS BLDA CALC-SCNC: 3 MMOL/L (ref -5–5)
BASE EXCESS BLDA CALC-SCNC: 5 MMOL/L (ref -5–5)
BASE EXCESS BLDA CALC-SCNC: 6 MMOL/L (ref -5–5)
BASOPHILS # BLD AUTO: 0.01 10*3/MM3 (ref 0–0.2)
BASOPHILS NFR BLD AUTO: 0.1 % (ref 0–1.5)
BDY SITE: ABNORMAL
BH CV XLRA MEAS - DIST GSV CALF DIST LEFT: 0.27 CM
BH CV XLRA MEAS - DIST GSV CALF DIST RIGHT: 0.22 CM
BH CV XLRA MEAS - DIST GSV THIGH DIST LEFT: 0.41 CM
BH CV XLRA MEAS - DIST GSV THIGH DIST RIGHT: 0.35 CM
BH CV XLRA MEAS - GSV ANKLE DIST LEFT: 0.26 CM
BH CV XLRA MEAS - GSV ANKLE DIST RIGHT: 0.22 CM
BH CV XLRA MEAS - GSV KNEE DIST LEFT: 0.35 CM
BH CV XLRA MEAS - GSV KNEE DIST RIGHT: 0.31 CM
BH CV XLRA MEAS - GSV ORIGIN DIST LEFT: 0.51 CM
BH CV XLRA MEAS - GSV ORIGIN DIST RIGHT: 0.5 CM
BH CV XLRA MEAS - MID GSV CALF LEFT: 0.26 CM
BH CV XLRA MEAS - MID GSV CALF RIGHT: 0.18 CM
BH CV XLRA MEAS - MID GSV THIGH  LEFT: 0.28 CM
BH CV XLRA MEAS - MID GSV THIGH  RIGHT: 0.33 CM
BH CV XLRA MEAS - PROX GSV CALF DIST LEFT: 0.24 CM
BH CV XLRA MEAS - PROX GSV CALF DIST RIGHT: 0.29 CM
BH CV XLRA MEAS - PROX GSV THIGH  LEFT: 0.37 CM
BH CV XLRA MEAS - PROX GSV THIGH  RIGHT: 0.37 CM
BH CV XLRA MEAS LEFT CCA RATIO VEL: -89.6 CM/SEC
BH CV XLRA MEAS LEFT DIST CCA EDV: -22.8 CM/SEC
BH CV XLRA MEAS LEFT DIST CCA PSV: -89.6 CM/SEC
BH CV XLRA MEAS LEFT DIST ICA EDV: -28.1 CM/SEC
BH CV XLRA MEAS LEFT DIST ICA PSV: -81.5 CM/SEC
BH CV XLRA MEAS LEFT ICA RATIO VEL: -83.3 CM/SEC
BH CV XLRA MEAS LEFT ICA/CCA RATIO: 0.93
BH CV XLRA MEAS LEFT MID ICA EDV: -31.1 CM/SEC
BH CV XLRA MEAS LEFT MID ICA PSV: -83.3 CM/SEC
BH CV XLRA MEAS LEFT PROX CCA EDV: 20.4 CM/SEC
BH CV XLRA MEAS LEFT PROX CCA PSV: 112 CM/SEC
BH CV XLRA MEAS LEFT PROX ECA EDV: -18.7 CM/SEC
BH CV XLRA MEAS LEFT PROX ECA PSV: -132 CM/SEC
BH CV XLRA MEAS LEFT PROX ICA EDV: -24.4 CM/SEC
BH CV XLRA MEAS LEFT PROX ICA PSV: -83.3 CM/SEC
BH CV XLRA MEAS LEFT PROX SCLA PSV: 130 CM/SEC
BH CV XLRA MEAS LEFT VERTEBRAL A EDV: 17.6 CM/SEC
BH CV XLRA MEAS LEFT VERTEBRAL A PSV: 56.3 CM/SEC
BH CV XLRA MEAS RIGHT CCA RATIO VEL: -99.8 CM/SEC
BH CV XLRA MEAS RIGHT DIST CCA EDV: -25.9 CM/SEC
BH CV XLRA MEAS RIGHT DIST CCA PSV: -99.8 CM/SEC
BH CV XLRA MEAS RIGHT DIST ICA EDV: -24 CM/SEC
BH CV XLRA MEAS RIGHT DIST ICA PSV: -73.3 CM/SEC
BH CV XLRA MEAS RIGHT ICA RATIO VEL: -86.2 CM/SEC
BH CV XLRA MEAS RIGHT ICA/CCA RATIO: 0.86
BH CV XLRA MEAS RIGHT MID ICA EDV: -24 CM/SEC
BH CV XLRA MEAS RIGHT MID ICA PSV: -86.2 CM/SEC
BH CV XLRA MEAS RIGHT PROX CCA EDV: 22 CM/SEC
BH CV XLRA MEAS RIGHT PROX CCA PSV: 111 CM/SEC
BH CV XLRA MEAS RIGHT PROX ECA EDV: -24.6 CM/SEC
BH CV XLRA MEAS RIGHT PROX ECA PSV: -152 CM/SEC
BH CV XLRA MEAS RIGHT PROX ICA EDV: -18.8 CM/SEC
BH CV XLRA MEAS RIGHT PROX ICA PSV: -82.1 CM/SEC
BH CV XLRA MEAS RIGHT PROX SCLA EDV: -17 CM/SEC
BH CV XLRA MEAS RIGHT PROX SCLA PSV: -156 CM/SEC
BH CV XLRA MEAS RIGHT VERTEBRAL A EDV: -10.2 CM/SEC
BH CV XLRA MEAS RIGHT VERTEBRAL A PSV: -42.8 CM/SEC
BUN BLD-MCNC: 11 MG/DL (ref 6–20)
BUN/CREAT SERPL: 10.3 (ref 7–25)
BUN/CREAT SERPL: 11.8 (ref 7–25)
BUN/CREAT SERPL: 8.9 (ref 7–25)
CA-I BLD-MCNC: 5 MG/DL (ref 4.6–5.4)
CA-I SERPL ISE-MCNC: 1.25 MMOL/L (ref 1.1–1.35)
CALCIUM SPEC-SCNC: 9 MG/DL (ref 8.6–10.5)
CALCIUM SPEC-SCNC: 9.1 MG/DL (ref 8.6–10.5)
CALCIUM SPEC-SCNC: 9.3 MG/DL (ref 8.6–10.5)
CHLORIDE SERPL-SCNC: 101 MMOL/L (ref 98–107)
CHLORIDE SERPL-SCNC: 104 MMOL/L (ref 98–107)
CHLORIDE SERPL-SCNC: 107 MMOL/L (ref 98–107)
CO2 BLDA-SCNC: 27 MMOL/L (ref 24–29)
CO2 BLDA-SCNC: 27 MMOL/L (ref 24–29)
CO2 BLDA-SCNC: 30 MMOL/L (ref 24–29)
CO2 BLDA-SCNC: 30 MMOL/L (ref 24–29)
CO2 BLDA-SCNC: 31 MMOL/L (ref 24–29)
CO2 BLDA-SCNC: 34 MMOL/L (ref 24–29)
CO2 SERPL-SCNC: 23.6 MMOL/L (ref 22–29)
CO2 SERPL-SCNC: 24.3 MMOL/L (ref 22–29)
CO2 SERPL-SCNC: 25.7 MMOL/L (ref 22–29)
CREAT BLD-MCNC: 0.93 MG/DL (ref 0.76–1.27)
CREAT BLD-MCNC: 1.07 MG/DL (ref 0.76–1.27)
CREAT BLD-MCNC: 1.24 MG/DL (ref 0.76–1.27)
DEPRECATED RDW RBC AUTO: 41.4 FL (ref 37–54)
DEPRECATED RDW RBC AUTO: 42 FL (ref 37–54)
EOSINOPHIL # BLD AUTO: 0.13 10*3/MM3 (ref 0–0.7)
EOSINOPHIL NFR BLD AUTO: 1.5 % (ref 0.3–6.2)
ERYTHROCYTE [DISTWIDTH] IN BLOOD BY AUTOMATED COUNT: 11.7 % (ref 11.5–14.5)
ERYTHROCYTE [DISTWIDTH] IN BLOOD BY AUTOMATED COUNT: 11.8 % (ref 11.5–14.5)
GFR SERPL CREATININE-BSD FRML MDRD: 62 ML/MIN/1.73
GFR SERPL CREATININE-BSD FRML MDRD: 74 ML/MIN/1.73
GFR SERPL CREATININE-BSD FRML MDRD: 87 ML/MIN/1.73
GLUCOSE BLD-MCNC: 102 MG/DL (ref 65–99)
GLUCOSE BLD-MCNC: 132 MG/DL (ref 65–99)
GLUCOSE BLD-MCNC: 294 MG/DL (ref 65–99)
GLUCOSE BLDC GLUCOMTR-MCNC: 105 MG/DL (ref 70–130)
GLUCOSE BLDC GLUCOMTR-MCNC: 114 MG/DL (ref 70–130)
GLUCOSE BLDC GLUCOMTR-MCNC: 130 MG/DL (ref 70–130)
GLUCOSE BLDC GLUCOMTR-MCNC: 150 MG/DL (ref 70–130)
GLUCOSE BLDC GLUCOMTR-MCNC: 150 MG/DL (ref 70–130)
GLUCOSE BLDC GLUCOMTR-MCNC: 152 MG/DL (ref 70–130)
GLUCOSE BLDC GLUCOMTR-MCNC: 157 MG/DL (ref 70–130)
GLUCOSE BLDC GLUCOMTR-MCNC: 164 MG/DL (ref 70–130)
GLUCOSE BLDC GLUCOMTR-MCNC: 166 MG/DL (ref 70–130)
GLUCOSE BLDC GLUCOMTR-MCNC: 169 MG/DL (ref 70–130)
GLUCOSE BLDC GLUCOMTR-MCNC: 173 MG/DL (ref 70–130)
GLUCOSE BLDC GLUCOMTR-MCNC: 175 MG/DL (ref 70–130)
GLUCOSE BLDC GLUCOMTR-MCNC: 177 MG/DL (ref 70–130)
GLUCOSE BLDC GLUCOMTR-MCNC: 209 MG/DL (ref 70–130)
GLUCOSE BLDC GLUCOMTR-MCNC: 90 MG/DL (ref 70–130)
GLUCOSE BLDC GLUCOMTR-MCNC: 96 MG/DL (ref 70–130)
HCO3 BLDA-SCNC: 25 MMOL/L (ref 22–28)
HCO3 BLDA-SCNC: 25.3 MMOL/L (ref 22–26)
HCO3 BLDA-SCNC: 25.7 MMOL/L (ref 22–26)
HCO3 BLDA-SCNC: 26 MMOL/L (ref 22–28)
HCO3 BLDA-SCNC: 26.1 MMOL/L (ref 22–28)
HCO3 BLDA-SCNC: 27.1 MMOL/L (ref 22–28)
HCO3 BLDA-SCNC: 28 MMOL/L (ref 22–26)
HCO3 BLDA-SCNC: 28.8 MMOL/L (ref 22–26)
HCO3 BLDA-SCNC: 29.9 MMOL/L (ref 22–26)
HCO3 BLDA-SCNC: 31.7 MMOL/L (ref 22–26)
HCT VFR BLD AUTO: 39.8 % (ref 40.4–52.2)
HCT VFR BLD AUTO: 41.2 % (ref 40.4–52.2)
HCT VFR BLDA CALC: 33 % (ref 38–51)
HCT VFR BLDA CALC: 34 % (ref 38–51)
HCT VFR BLDA CALC: 35 % (ref 38–51)
HCT VFR BLDA CALC: 42 % (ref 38–51)
HGB BLD-MCNC: 14.3 G/DL (ref 13.7–17.6)
HGB BLD-MCNC: 14.6 G/DL (ref 13.7–17.6)
HGB BLDA-MCNC: 11.2 G/DL (ref 12–17)
HGB BLDA-MCNC: 11.6 G/DL (ref 12–17)
HGB BLDA-MCNC: 11.9 G/DL (ref 12–17)
HGB BLDA-MCNC: 14.3 G/DL (ref 12–17)
HOROWITZ INDEX BLD+IHG-RTO: 100 %
HOROWITZ INDEX BLD+IHG-RTO: 40 %
HOROWITZ INDEX BLD+IHG-RTO: 40 %
IMM GRANULOCYTES # BLD: 0.02 10*3/MM3 (ref 0–0.03)
IMM GRANULOCYTES NFR BLD: 0.2 % (ref 0–0.5)
INR PPP: 1.25 (ref 0.9–1.1)
LEFT ARM BP: NORMAL MMHG
LYMPHOCYTES # BLD AUTO: 1.73 10*3/MM3 (ref 0.9–4.8)
LYMPHOCYTES NFR BLD AUTO: 19.8 % (ref 19.6–45.3)
MAGNESIUM SERPL-MCNC: 2.4 MG/DL (ref 1.6–2.6)
MAGNESIUM SERPL-MCNC: 2.9 MG/DL (ref 1.6–2.6)
MCH RBC QN AUTO: 34.4 PG (ref 27–32.7)
MCH RBC QN AUTO: 35 PG (ref 27–32.7)
MCHC RBC AUTO-ENTMCNC: 35.4 G/DL (ref 32.6–36.4)
MCHC RBC AUTO-ENTMCNC: 35.9 G/DL (ref 32.6–36.4)
MCV RBC AUTO: 97.2 FL (ref 79.8–96.2)
MCV RBC AUTO: 97.3 FL (ref 79.8–96.2)
MODALITY: ABNORMAL
MONOCYTES # BLD AUTO: 0.28 10*3/MM3 (ref 0.2–1.2)
MONOCYTES NFR BLD AUTO: 3.2 % (ref 5–12)
NEUTROPHILS # BLD AUTO: 6.58 10*3/MM3 (ref 1.9–8.1)
NEUTROPHILS NFR BLD AUTO: 75.2 % (ref 42.7–76)
O2 A-A PPRESDIFF RESPIRATORY: 0.4 MMHG
O2 A-A PPRESDIFF RESPIRATORY: 0.5 MMHG
O2 A-A PPRESDIFF RESPIRATORY: 0.5 MMHG
PCO2 BLDA: 41.9 MM HG (ref 35–45)
PCO2 BLDA: 43 MM HG (ref 35–45)
PCO2 BLDA: 44.7 MM HG (ref 35–45)
PCO2 BLDA: 45.7 MM HG (ref 35–45)
PCO2 BLDA: 47.6 MM HG (ref 35–45)
PCO2 BLDA: 49.2 MM HG (ref 35–45)
PCO2 BLDA: 50.4 MM HG (ref 35–45)
PCO2 BLDA: 50.7 MM HG (ref 35–45)
PCO2 BLDA: 56.6 MM HG (ref 35–45)
PCO2 BLDA: 60.4 MM HG (ref 35–45)
PEEP RESPIRATORY: 7.5 CM[H2O]
PH BLDA: 7.3 PH UNITS (ref 7.35–7.6)
PH BLDA: 7.33 PH UNITS (ref 7.35–7.45)
PH BLDA: 7.33 PH UNITS (ref 7.35–7.6)
PH BLDA: 7.34 PH UNITS (ref 7.35–7.6)
PH BLDA: 7.35 PH UNITS (ref 7.35–7.6)
PH BLDA: 7.36 PH UNITS (ref 7.35–7.45)
PH BLDA: 7.37 PH UNITS (ref 7.35–7.6)
PH BLDA: 7.38 PH UNITS (ref 7.35–7.6)
PH BLDA: 7.4 PH UNITS (ref 7.35–7.45)
PH BLDA: 7.41 PH UNITS (ref 7.35–7.45)
PHOSPHATE SERPL-MCNC: 3.1 MG/DL (ref 2.5–4.5)
PHOSPHATE SERPL-MCNC: 3.6 MG/DL (ref 2.5–4.5)
PLATELET # BLD AUTO: 113 10*3/MM3 (ref 140–500)
PLATELET # BLD AUTO: 117 10*3/MM3 (ref 140–500)
PMV BLD AUTO: 10.4 FL (ref 6–12)
PMV BLD AUTO: 10.4 FL (ref 6–12)
PO2 BLDA: 112.9 MM HG (ref 80–100)
PO2 BLDA: 116.7 MM HG (ref 80–100)
PO2 BLDA: 152 MMHG (ref 80–105)
PO2 BLDA: 253.3 MM HG (ref 80–100)
PO2 BLDA: 400 MMHG (ref 80–105)
PO2 BLDA: 423 MMHG (ref 80–105)
PO2 BLDA: 53 MMHG (ref 80–105)
PO2 BLDA: 605 MMHG (ref 80–105)
PO2 BLDA: 77.3 MM HG (ref 80–100)
PO2 BLDA: 92 MMHG (ref 80–105)
POTASSIUM BLD-SCNC: 4 MMOL/L (ref 3.5–5.2)
POTASSIUM BLD-SCNC: 4.2 MMOL/L (ref 3.5–5.2)
POTASSIUM BLD-SCNC: 4.3 MMOL/L (ref 3.5–5.2)
POTASSIUM BLD-SCNC: 4.8 MMOL/L (ref 3.5–5.2)
POTASSIUM BLDA-SCNC: 4.1 MMOL/L (ref 3.5–4.9)
POTASSIUM BLDA-SCNC: 4.3 MMOL/L (ref 3.5–4.9)
POTASSIUM BLDA-SCNC: 4.7 MMOL/L (ref 3.5–4.9)
POTASSIUM BLDA-SCNC: 5 MMOL/L (ref 3.5–4.9)
POTASSIUM BLDA-SCNC: 5.6 MMOL/L (ref 3.5–4.9)
POTASSIUM BLDA-SCNC: 5.6 MMOL/L (ref 3.5–4.9)
PROTHROMBIN TIME: 15.2 SECONDS (ref 11.7–14.2)
PSV: 8 CMH2O
RBC # BLD AUTO: 4.09 10*6/MM3 (ref 4.6–6)
RBC # BLD AUTO: 4.24 10*6/MM3 (ref 4.6–6)
RIGHT ARM BP: NORMAL MMHG
SAO2 % BLDA: 100 % (ref 95–98)
SAO2 % BLDA: 82 % (ref 95–98)
SAO2 % BLDA: 97 % (ref 95–98)
SAO2 % BLDA: 99 % (ref 95–98)
SAO2 % BLDCOA: 95.3 % (ref 92–99)
SAO2 % BLDCOA: 98 % (ref 92–99)
SAO2 % BLDCOA: 98.6 % (ref 92–99)
SAO2 % BLDCOA: 99.8 % (ref 92–99)
SET MECH RESP RATE: 12
SET MECH RESP RATE: 14
SET MECH RESP RATE: 14
SET MECH RESP RATE: 20
SODIUM BLD-SCNC: 139 MMOL/L (ref 136–145)
SODIUM BLD-SCNC: 143 MMOL/L (ref 136–145)
SODIUM BLD-SCNC: 145 MMOL/L (ref 136–145)
TOTAL RATE: 12 BREATHS/MINUTE
TOTAL RATE: 14 BREATHS/MINUTE
TOTAL RATE: 20 BREATHS/MINUTE
VENTILATOR MODE: ABNORMAL
VT ON VENT VENT: 700 ML
VT ON VENT VENT: 800 ML
VT ON VENT VENT: 800 ML
WBC NRBC COR # BLD: 10.8 10*3/MM3 (ref 4.5–10.7)
WBC NRBC COR # BLD: 8.75 10*3/MM3 (ref 4.5–10.7)

## 2017-01-13 PROCEDURE — 93312 ECHO TRANSESOPHAGEAL: CPT

## 2017-01-13 PROCEDURE — P9046 ALBUMIN (HUMAN), 25%, 20 ML: HCPCS

## 2017-01-13 PROCEDURE — 5A1221Z PERFORMANCE OF CARDIAC OUTPUT, CONTINUOUS: ICD-10-PCS | Performed by: THORACIC SURGERY (CARDIOTHORACIC VASCULAR SURGERY)

## 2017-01-13 PROCEDURE — 25010000002 MIDAZOLAM PER 1 MG: Performed by: ANESTHESIOLOGY

## 2017-01-13 PROCEDURE — 25010000002 MORPHINE PER 10 MG: Performed by: NURSE PRACTITIONER

## 2017-01-13 PROCEDURE — 80069 RENAL FUNCTION PANEL: CPT | Performed by: NURSE PRACTITIONER

## 2017-01-13 PROCEDURE — 25010000002 ONDANSETRON PER 1 MG: Performed by: INTERNAL MEDICINE

## 2017-01-13 PROCEDURE — 25010000002 HEPARIN (PORCINE) PER 1000 UNITS: Performed by: ANESTHESIOLOGY

## 2017-01-13 PROCEDURE — 25010000002 MAGNESIUM SULFATE IN D5W 1G/100ML (PREMIX) 10-5 MG/ML-% SOLUTION: Performed by: NURSE PRACTITIONER

## 2017-01-13 PROCEDURE — 85610 PROTHROMBIN TIME: CPT | Performed by: NURSE PRACTITIONER

## 2017-01-13 PROCEDURE — 25010000002 HEPARIN (PORCINE) PER 1000 UNITS: Performed by: THORACIC SURGERY (CARDIOTHORACIC VASCULAR SURGERY)

## 2017-01-13 PROCEDURE — 25010000002 ALBUMIN HUMAN 5% PER 50 ML: Performed by: NURSE PRACTITIONER

## 2017-01-13 PROCEDURE — 85025 COMPLETE CBC W/AUTO DIFF WBC: CPT | Performed by: NURSE PRACTITIONER

## 2017-01-13 PROCEDURE — 4A10X4G MONITORING OF CENTRAL NERVOUS ELECTRICAL ACTIVITY, INTRAOPERATIVE, EXTERNAL APPROACH: ICD-10-PCS | Performed by: THORACIC SURGERY (CARDIOTHORACIC VASCULAR SURGERY)

## 2017-01-13 PROCEDURE — C1713 ANCHOR/SCREW BN/BN,TIS/BN: HCPCS | Performed by: THORACIC SURGERY (CARDIOTHORACIC VASCULAR SURGERY)

## 2017-01-13 PROCEDURE — 85730 THROMBOPLASTIN TIME PARTIAL: CPT | Performed by: NURSE PRACTITIONER

## 2017-01-13 PROCEDURE — 021309W BYPASS CORONARY ARTERY, FOUR OR MORE ARTERIES FROM AORTA WITH AUTOLOGOUS VENOUS TISSUE, OPEN APPROACH: ICD-10-PCS | Performed by: THORACIC SURGERY (CARDIOTHORACIC VASCULAR SURGERY)

## 2017-01-13 PROCEDURE — 82962 GLUCOSE BLOOD TEST: CPT

## 2017-01-13 PROCEDURE — 02110Z9 BYPASS CORONARY ARTERY, TWO ARTERIES FROM LEFT INTERNAL MAMMARY, OPEN APPROACH: ICD-10-PCS | Performed by: THORACIC SURGERY (CARDIOTHORACIC VASCULAR SURGERY)

## 2017-01-13 PROCEDURE — 25010000002 PROPOFOL 1000 MG/ML EMULSION: Performed by: ANESTHESIOLOGY

## 2017-01-13 PROCEDURE — 93005 ELECTROCARDIOGRAM TRACING: CPT | Performed by: NURSE PRACTITIONER

## 2017-01-13 PROCEDURE — 82330 ASSAY OF CALCIUM: CPT | Performed by: NURSE PRACTITIONER

## 2017-01-13 PROCEDURE — 94799 UNLISTED PULMONARY SVC/PX: CPT

## 2017-01-13 PROCEDURE — 25010000002 MAGNESIUM SULFATE PER 500 MG OF MAGNESIUM: Performed by: ANESTHESIOLOGY

## 2017-01-13 PROCEDURE — 80048 BASIC METABOLIC PNL TOTAL CA: CPT | Performed by: THORACIC SURGERY (CARDIOTHORACIC VASCULAR SURGERY)

## 2017-01-13 PROCEDURE — 25010000002 METOCLOPRAMIDE PER 10 MG: Performed by: NURSE PRACTITIONER

## 2017-01-13 PROCEDURE — 25010000002 HEPARIN (PORCINE) PER 1000 UNITS

## 2017-01-13 PROCEDURE — 25010000003 CEFAZOLIN IN DEXTROSE 2-4 GM/100ML-% SOLUTION: Performed by: THORACIC SURGERY (CARDIOTHORACIC VASCULAR SURGERY)

## 2017-01-13 PROCEDURE — 33508 ENDOSCOPIC VEIN HARVEST: CPT | Performed by: THORACIC SURGERY (CARDIOTHORACIC VASCULAR SURGERY)

## 2017-01-13 PROCEDURE — 82803 BLOOD GASES ANY COMBINATION: CPT

## 2017-01-13 PROCEDURE — 63710000001 INSULIN REGULAR HUMAN PER 5 UNITS: Performed by: ANESTHESIOLOGY

## 2017-01-13 PROCEDURE — 25010000002 PROPOFOL 1000 MG/ML EMULSION: Performed by: NURSE PRACTITIONER

## 2017-01-13 PROCEDURE — 33533 CABG ARTERIAL SINGLE: CPT | Performed by: THORACIC SURGERY (CARDIOTHORACIC VASCULAR SURGERY)

## 2017-01-13 PROCEDURE — 25010000002 PHENYLEPHRINE PER 1 ML: Performed by: ANESTHESIOLOGY

## 2017-01-13 PROCEDURE — P9041 ALBUMIN (HUMAN),5%, 50ML: HCPCS | Performed by: NURSE PRACTITIONER

## 2017-01-13 PROCEDURE — 06BQ4ZZ EXCISION OF LEFT SAPHENOUS VEIN, PERCUTANEOUS ENDOSCOPIC APPROACH: ICD-10-PCS | Performed by: THORACIC SURGERY (CARDIOTHORACIC VASCULAR SURGERY)

## 2017-01-13 PROCEDURE — 83735 ASSAY OF MAGNESIUM: CPT | Performed by: NURSE PRACTITIONER

## 2017-01-13 PROCEDURE — 25010000003 CEFAZOLIN IN DEXTROSE 2-4 GM/100ML-% SOLUTION: Performed by: NURSE PRACTITIONER

## 2017-01-13 PROCEDURE — A4648 IMPLANTABLE TISSUE MARKER: HCPCS | Performed by: THORACIC SURGERY (CARDIOTHORACIC VASCULAR SURGERY)

## 2017-01-13 PROCEDURE — 33523 CABG ART-VEIN SIX OR MORE: CPT | Performed by: THORACIC SURGERY (CARDIOTHORACIC VASCULAR SURGERY)

## 2017-01-13 PROCEDURE — 93010 ELECTROCARDIOGRAM REPORT: CPT | Performed by: INTERNAL MEDICINE

## 2017-01-13 PROCEDURE — B246ZZ4 ULTRASONOGRAPHY OF RIGHT AND LEFT HEART, TRANSESOPHAGEAL: ICD-10-PCS | Performed by: THORACIC SURGERY (CARDIOTHORACIC VASCULAR SURGERY)

## 2017-01-13 PROCEDURE — 36600 WITHDRAWAL OF ARTERIAL BLOOD: CPT

## 2017-01-13 PROCEDURE — 25010000002 VANCOMYCIN PER 500 MG

## 2017-01-13 PROCEDURE — C1887 CATHETER, GUIDING: HCPCS | Performed by: THORACIC SURGERY (CARDIOTHORACIC VASCULAR SURGERY)

## 2017-01-13 PROCEDURE — 94002 VENT MGMT INPAT INIT DAY: CPT

## 2017-01-13 PROCEDURE — 71010 HC CHEST PA OR AP: CPT

## 2017-01-13 PROCEDURE — 85027 COMPLETE CBC AUTOMATED: CPT | Performed by: NURSE PRACTITIONER

## 2017-01-13 PROCEDURE — 25010000002 ALBUMIN HUMAN 25% PER 50 ML

## 2017-01-13 PROCEDURE — 84132 ASSAY OF SERUM POTASSIUM: CPT | Performed by: NURSE PRACTITIONER

## 2017-01-13 PROCEDURE — C1729 CATH, DRAINAGE: HCPCS | Performed by: THORACIC SURGERY (CARDIOTHORACIC VASCULAR SURGERY)

## 2017-01-13 PROCEDURE — C1751 CATH, INF, PER/CENT/MIDLINE: HCPCS | Performed by: THORACIC SURGERY (CARDIOTHORACIC VASCULAR SURGERY)

## 2017-01-13 PROCEDURE — 25010000002 FUROSEMIDE PER 20 MG

## 2017-01-13 PROCEDURE — 25010000002 PROTAMINE SULFATE PER 10 MG: Performed by: NURSE PRACTITIONER

## 2017-01-13 PROCEDURE — 25010000002 FENTANYL CITRATE (PF) 100 MCG/2ML SOLUTION: Performed by: ANESTHESIOLOGY

## 2017-01-13 PROCEDURE — 25010000002 PROPOFOL 10 MG/ML EMULSION: Performed by: ANESTHESIOLOGY

## 2017-01-13 PROCEDURE — 25010000003 PROTAMINE SULFATE PER 10 MG: Performed by: THORACIC SURGERY (CARDIOTHORACIC VASCULAR SURGERY)

## 2017-01-13 PROCEDURE — 93318 ECHO TRANSESOPHAGEAL INTRAOP: CPT

## 2017-01-13 DEVICE — SS SUTURE, 3 PER SLEEVE
Type: IMPLANTABLE DEVICE | Site: STERNUM | Status: FUNCTIONAL
Brand: MYO/WIRE II

## 2017-01-13 DEVICE — SS SUTURE, 4 PER SLEEVE
Type: IMPLANTABLE DEVICE | Site: STERNUM | Status: FUNCTIONAL
Brand: MYO/WIRE II

## 2017-01-13 DEVICE — CORONARY ARTERY BYPASS GRAFT MARKERS, STAINLESS STEEL, DISTAL, WITHOUT HOLDER
Type: IMPLANTABLE DEVICE | Site: HEART | Status: FUNCTIONAL
Brand: ANASTOMARK CORONARY ARTERY BYPASS GRAFT MARKERS, STAINLESS STEEL, DISTAL

## 2017-01-13 RX ORDER — CYCLOBENZAPRINE HCL 10 MG
10 TABLET ORAL EVERY 8 HOURS PRN
Status: DISCONTINUED | OUTPATIENT
Start: 2017-01-14 | End: 2017-01-18 | Stop reason: HOSPADM

## 2017-01-13 RX ORDER — NITROGLYCERIN 20 MG/100ML
5-200 INJECTION INTRAVENOUS CONTINUOUS PRN
Status: DISCONTINUED | OUTPATIENT
Start: 2017-01-13 | End: 2017-01-14

## 2017-01-13 RX ORDER — LIDOCAINE HYDROCHLORIDE 40 MG/ML
SOLUTION TOPICAL AS NEEDED
Status: DISCONTINUED | OUTPATIENT
Start: 2017-01-13 | End: 2017-01-13 | Stop reason: SURG

## 2017-01-13 RX ORDER — PANTOPRAZOLE SODIUM 40 MG/10ML
40 INJECTION, POWDER, LYOPHILIZED, FOR SOLUTION INTRAVENOUS
Status: DISCONTINUED | OUTPATIENT
Start: 2017-01-14 | End: 2017-01-14

## 2017-01-13 RX ORDER — POTASSIUM CHLORIDE 1.5 G/1.77G
40 POWDER, FOR SOLUTION ORAL AS NEEDED
Status: DISCONTINUED | OUTPATIENT
Start: 2017-01-13 | End: 2017-01-18 | Stop reason: HOSPADM

## 2017-01-13 RX ORDER — PROMETHAZINE HYDROCHLORIDE 25 MG/1
12.5 TABLET ORAL EVERY 6 HOURS PRN
Status: DISCONTINUED | OUTPATIENT
Start: 2017-01-13 | End: 2017-01-14

## 2017-01-13 RX ORDER — CEFAZOLIN SODIUM 2 G/100ML
2 INJECTION, SOLUTION INTRAVENOUS EVERY 8 HOURS
Status: COMPLETED | OUTPATIENT
Start: 2017-01-13 | End: 2017-01-15

## 2017-01-13 RX ORDER — HEPARIN SODIUM 5000 [USP'U]/ML
INJECTION, SOLUTION INTRAVENOUS; SUBCUTANEOUS AS NEEDED
Status: DISCONTINUED | OUTPATIENT
Start: 2017-01-13 | End: 2017-01-13 | Stop reason: HOSPADM

## 2017-01-13 RX ORDER — PROTAMINE SULFATE 10 MG/ML
INJECTION, SOLUTION INTRAVENOUS AS NEEDED
Status: DISCONTINUED | OUTPATIENT
Start: 2017-01-13 | End: 2017-01-13 | Stop reason: HOSPADM

## 2017-01-13 RX ORDER — POTASSIUM CHLORIDE 7.45 MG/ML
10 INJECTION INTRAVENOUS
Status: DISCONTINUED | OUTPATIENT
Start: 2017-01-13 | End: 2017-01-18 | Stop reason: HOSPADM

## 2017-01-13 RX ORDER — MAGNESIUM SULFATE HEPTAHYDRATE 500 MG/ML
INJECTION, SOLUTION INTRAMUSCULAR; INTRAVENOUS AS NEEDED
Status: DISCONTINUED | OUTPATIENT
Start: 2017-01-13 | End: 2017-01-13 | Stop reason: SURG

## 2017-01-13 RX ORDER — ACETAMINOPHEN 325 MG/1
650 TABLET ORAL EVERY 4 HOURS PRN
Status: DISCONTINUED | OUTPATIENT
Start: 2017-01-13 | End: 2017-01-18 | Stop reason: HOSPADM

## 2017-01-13 RX ORDER — ALPRAZOLAM 0.25 MG/1
0.25 TABLET ORAL EVERY 8 HOURS PRN
Status: DISCONTINUED | OUTPATIENT
Start: 2017-01-13 | End: 2017-01-18 | Stop reason: HOSPADM

## 2017-01-13 RX ORDER — FENTANYL CITRATE 50 UG/ML
INJECTION, SOLUTION INTRAMUSCULAR; INTRAVENOUS AS NEEDED
Status: DISCONTINUED | OUTPATIENT
Start: 2017-01-13 | End: 2017-01-13 | Stop reason: SURG

## 2017-01-13 RX ORDER — VECURONIUM BROMIDE 1 MG/ML
INJECTION, POWDER, LYOPHILIZED, FOR SOLUTION INTRAVENOUS AS NEEDED
Status: DISCONTINUED | OUTPATIENT
Start: 2017-01-13 | End: 2017-01-13 | Stop reason: SURG

## 2017-01-13 RX ORDER — HEPARIN SODIUM 1000 [USP'U]/ML
INJECTION, SOLUTION INTRAVENOUS; SUBCUTANEOUS AS NEEDED
Status: DISCONTINUED | OUTPATIENT
Start: 2017-01-13 | End: 2017-01-13 | Stop reason: SURG

## 2017-01-13 RX ORDER — MIDAZOLAM HYDROCHLORIDE 1 MG/ML
2 INJECTION INTRAMUSCULAR; INTRAVENOUS
Status: DISCONTINUED | OUTPATIENT
Start: 2017-01-13 | End: 2017-01-13

## 2017-01-13 RX ORDER — SODIUM CHLORIDE 0.9 % (FLUSH) 0.9 %
30 SYRINGE (ML) INJECTION ONCE AS NEEDED
Status: DISCONTINUED | OUTPATIENT
Start: 2017-01-13 | End: 2017-01-14

## 2017-01-13 RX ORDER — PROTAMINE SULFATE 10 MG/ML
50 INJECTION, SOLUTION INTRAVENOUS ONCE
Status: COMPLETED | OUTPATIENT
Start: 2017-01-13 | End: 2017-01-13

## 2017-01-13 RX ORDER — SODIUM CHLORIDE 9 MG/ML
30 INJECTION, SOLUTION INTRAVENOUS CONTINUOUS PRN
Status: DISCONTINUED | OUTPATIENT
Start: 2017-01-13 | End: 2017-01-14

## 2017-01-13 RX ORDER — ROCURONIUM BROMIDE 10 MG/ML
INJECTION, SOLUTION INTRAVENOUS AS NEEDED
Status: DISCONTINUED | OUTPATIENT
Start: 2017-01-13 | End: 2017-01-13 | Stop reason: SURG

## 2017-01-13 RX ORDER — AMINOCAPROIC ACID 250 MG/ML
INJECTION, SOLUTION INTRAVENOUS AS NEEDED
Status: DISCONTINUED | OUTPATIENT
Start: 2017-01-13 | End: 2017-01-13 | Stop reason: SURG

## 2017-01-13 RX ORDER — POTASSIUM CHLORIDE 29.8 MG/ML
20 INJECTION INTRAVENOUS
Status: DISCONTINUED | OUTPATIENT
Start: 2017-01-13 | End: 2017-01-14

## 2017-01-13 RX ORDER — PAPAVERINE HYDROCHLORIDE 30 MG/ML
INJECTION INTRAMUSCULAR; INTRAVENOUS AS NEEDED
Status: DISCONTINUED | OUTPATIENT
Start: 2017-01-13 | End: 2017-01-13 | Stop reason: HOSPADM

## 2017-01-13 RX ORDER — SODIUM CHLORIDE 9 MG/ML
INJECTION, SOLUTION INTRAVENOUS CONTINUOUS PRN
Status: DISCONTINUED | OUTPATIENT
Start: 2017-01-13 | End: 2017-01-13 | Stop reason: SURG

## 2017-01-13 RX ORDER — ALBUMIN, HUMAN INJ 5% 5 %
SOLUTION INTRAVENOUS
Status: DISPENSED
Start: 2017-01-13 | End: 2017-01-14

## 2017-01-13 RX ORDER — PANTOPRAZOLE SODIUM 40 MG/1
40 TABLET, DELAYED RELEASE ORAL
Status: DISCONTINUED | OUTPATIENT
Start: 2017-01-14 | End: 2017-01-18 | Stop reason: HOSPADM

## 2017-01-13 RX ORDER — PROPOFOL 10 MG/ML
VIAL (ML) INTRAVENOUS AS NEEDED
Status: DISCONTINUED | OUTPATIENT
Start: 2017-01-13 | End: 2017-01-13 | Stop reason: SURG

## 2017-01-13 RX ORDER — OXYCODONE HYDROCHLORIDE 5 MG/1
10 TABLET ORAL EVERY 4 HOURS PRN
Status: DISCONTINUED | OUTPATIENT
Start: 2017-01-13 | End: 2017-01-18 | Stop reason: HOSPADM

## 2017-01-13 RX ORDER — POTASSIUM CHLORIDE 750 MG/1
40 CAPSULE, EXTENDED RELEASE ORAL AS NEEDED
Status: DISCONTINUED | OUTPATIENT
Start: 2017-01-13 | End: 2017-01-18 | Stop reason: HOSPADM

## 2017-01-13 RX ORDER — PANTOPRAZOLE SODIUM 40 MG/10ML
40 INJECTION, POWDER, LYOPHILIZED, FOR SOLUTION INTRAVENOUS EVERY 24 HOURS
Status: DISCONTINUED | OUTPATIENT
Start: 2017-01-13 | End: 2017-01-13 | Stop reason: SDUPTHER

## 2017-01-13 RX ORDER — ALBUMIN, HUMAN INJ 5% 5 %
1500 SOLUTION INTRAVENOUS AS NEEDED
Status: DISCONTINUED | OUTPATIENT
Start: 2017-01-13 | End: 2017-01-14

## 2017-01-13 RX ORDER — METOCLOPRAMIDE HYDROCHLORIDE 5 MG/ML
10 INJECTION INTRAMUSCULAR; INTRAVENOUS EVERY 6 HOURS
Status: COMPLETED | OUTPATIENT
Start: 2017-01-13 | End: 2017-01-14

## 2017-01-13 RX ORDER — ACETAMINOPHEN 10 MG/ML
1000 INJECTION, SOLUTION INTRAVENOUS ONCE
Status: COMPLETED | OUTPATIENT
Start: 2017-01-13 | End: 2017-01-13

## 2017-01-13 RX ORDER — ONDANSETRON 2 MG/ML
4 INJECTION INTRAMUSCULAR; INTRAVENOUS EVERY 6 HOURS PRN
Status: DISCONTINUED | OUTPATIENT
Start: 2017-01-13 | End: 2017-01-18 | Stop reason: HOSPADM

## 2017-01-13 RX ORDER — PROMETHAZINE HYDROCHLORIDE 25 MG/ML
12.5 INJECTION, SOLUTION INTRAMUSCULAR; INTRAVENOUS EVERY 6 HOURS PRN
Status: DISCONTINUED | OUTPATIENT
Start: 2017-01-13 | End: 2017-01-18 | Stop reason: HOSPADM

## 2017-01-13 RX ORDER — DOPAMINE HYDROCHLORIDE 160 MG/100ML
2-20 INJECTION, SOLUTION INTRAVENOUS CONTINUOUS PRN
Status: DISCONTINUED | OUTPATIENT
Start: 2017-01-13 | End: 2017-01-14

## 2017-01-13 RX ORDER — SODIUM CHLORIDE 9 MG/ML
30 INJECTION, SOLUTION INTRAVENOUS CONTINUOUS
Status: DISCONTINUED | OUTPATIENT
Start: 2017-01-13 | End: 2017-01-14

## 2017-01-13 RX ORDER — FUROSEMIDE 10 MG/ML
40 INJECTION INTRAMUSCULAR; INTRAVENOUS ONCE AS NEEDED
Status: COMPLETED | OUTPATIENT
Start: 2017-01-13 | End: 2017-01-14

## 2017-01-13 RX ORDER — NALOXONE HCL 0.4 MG/ML
0.4 VIAL (ML) INJECTION
Status: DISCONTINUED | OUTPATIENT
Start: 2017-01-13 | End: 2017-01-18 | Stop reason: HOSPADM

## 2017-01-13 RX ORDER — NITROGLYCERIN 20 MG/100ML
INJECTION INTRAVENOUS CONTINUOUS PRN
Status: DISCONTINUED | OUTPATIENT
Start: 2017-01-13 | End: 2017-01-13

## 2017-01-13 RX ORDER — DEXMEDETOMIDINE HYDROCHLORIDE 4 UG/ML
.2-1.5 INJECTION, SOLUTION INTRAVENOUS CONTINUOUS PRN
Status: DISCONTINUED | OUTPATIENT
Start: 2017-01-13 | End: 2017-01-14

## 2017-01-13 RX ORDER — HYDROCODONE BITARTRATE AND ACETAMINOPHEN 5; 325 MG/1; MG/1
2 TABLET ORAL EVERY 4 HOURS PRN
Status: DISCONTINUED | OUTPATIENT
Start: 2017-01-13 | End: 2017-01-18 | Stop reason: HOSPADM

## 2017-01-13 RX ORDER — BISACODYL 10 MG
10 SUPPOSITORY, RECTAL RECTAL DAILY PRN
Status: DISCONTINUED | OUTPATIENT
Start: 2017-01-14 | End: 2017-01-18 | Stop reason: HOSPADM

## 2017-01-13 RX ORDER — NITROGLYCERIN 5 MG/ML
INJECTION, SOLUTION INTRAVENOUS AS NEEDED
Status: DISCONTINUED | OUTPATIENT
Start: 2017-01-13 | End: 2017-01-13 | Stop reason: SURG

## 2017-01-13 RX ORDER — SENNA AND DOCUSATE SODIUM 50; 8.6 MG/1; MG/1
2 TABLET, FILM COATED ORAL NIGHTLY
Status: DISCONTINUED | OUTPATIENT
Start: 2017-01-14 | End: 2017-01-18 | Stop reason: HOSPADM

## 2017-01-13 RX ORDER — ASPIRIN 81 MG/1
81 TABLET ORAL DAILY
Status: DISCONTINUED | OUTPATIENT
Start: 2017-01-14 | End: 2017-01-18 | Stop reason: HOSPADM

## 2017-01-13 RX ADMIN — SODIUM CHLORIDE 30 ML/HR: 9 INJECTION, SOLUTION INTRAVENOUS at 12:10

## 2017-01-13 RX ADMIN — METOCLOPRAMIDE 10 MG: 5 INJECTION, SOLUTION INTRAMUSCULAR; INTRAVENOUS at 18:37

## 2017-01-13 RX ADMIN — HEPARIN SODIUM 30000 UNITS: 1000 INJECTION, SOLUTION INTRAVENOUS; SUBCUTANEOUS at 08:59

## 2017-01-13 RX ADMIN — ONDANSETRON 4 MG: 2 INJECTION INTRAMUSCULAR; INTRAVENOUS at 10:47

## 2017-01-13 RX ADMIN — AMINOCAPROIC ACID 10 G: 250 INJECTION, SOLUTION INTRAVENOUS at 07:50

## 2017-01-13 RX ADMIN — ALBUMIN HUMAN 500 ML: 0.05 INJECTION, SOLUTION INTRAVENOUS at 13:42

## 2017-01-13 RX ADMIN — FENTANYL CITRATE 250 MCG: 50 INJECTION INTRAMUSCULAR; INTRAVENOUS at 10:47

## 2017-01-13 RX ADMIN — PROPOFOL 50 MCG/KG/MIN: 10 INJECTION, EMULSION INTRAVENOUS at 11:40

## 2017-01-13 RX ADMIN — FENTANYL CITRATE 250 MCG: 50 INJECTION INTRAMUSCULAR; INTRAVENOUS at 10:20

## 2017-01-13 RX ADMIN — MIDAZOLAM HYDROCHLORIDE 3 MG: 1 INJECTION, SOLUTION INTRAMUSCULAR; INTRAVENOUS at 07:08

## 2017-01-13 RX ADMIN — VECURONIUM BROMIDE 2 MG: 1 INJECTION, POWDER, LYOPHILIZED, FOR SOLUTION INTRAVENOUS at 09:00

## 2017-01-13 RX ADMIN — ACETAMINOPHEN 1000 MG: 10 INJECTION, SOLUTION INTRAVENOUS at 12:05

## 2017-01-13 RX ADMIN — LIDOCAINE HYDROCHLORIDE 1 EACH: 40 SPRAY LARYNGEAL; TRANSTRACHEAL at 07:13

## 2017-01-13 RX ADMIN — METOCLOPRAMIDE 10 MG: 5 INJECTION, SOLUTION INTRAMUSCULAR; INTRAVENOUS at 23:24

## 2017-01-13 RX ADMIN — AMINOCAPROIC ACID 10 G: 250 INJECTION, SOLUTION INTRAVENOUS at 11:04

## 2017-01-13 RX ADMIN — MUPIROCIN 1 APPLICATION: 20 OINTMENT TOPICAL at 06:13

## 2017-01-13 RX ADMIN — NITROGLYCERIN 50 MCG: 5 INJECTION, SOLUTION INTRAVENOUS at 08:06

## 2017-01-13 RX ADMIN — OXYCODONE HYDROCHLORIDE 10 MG: 5 TABLET ORAL at 23:57

## 2017-01-13 RX ADMIN — SODIUM CHLORIDE 5 MG/HR: 9 INJECTION, SOLUTION INTRAVENOUS at 11:02

## 2017-01-13 RX ADMIN — PROPOFOL 100 MG: 10 INJECTION, EMULSION INTRAVENOUS at 07:59

## 2017-01-13 RX ADMIN — FENTANYL CITRATE 250 MCG: 50 INJECTION INTRAMUSCULAR; INTRAVENOUS at 08:13

## 2017-01-13 RX ADMIN — CEFAZOLIN SODIUM 2 G: 2 INJECTION, SOLUTION INTRAVENOUS at 18:37

## 2017-01-13 RX ADMIN — DEXMEDETOMIDINE HYDROCHLORIDE 0.3 MCG/KG/HR: 4 INJECTION, SOLUTION INTRAVENOUS at 15:00

## 2017-01-13 RX ADMIN — CHLORHEXIDINE GLUCONATE 15 ML: 1.2 RINSE ORAL at 06:12

## 2017-01-13 RX ADMIN — FENTANYL CITRATE 250 MCG: 50 INJECTION INTRAMUSCULAR; INTRAVENOUS at 08:04

## 2017-01-13 RX ADMIN — FENTANYL CITRATE 100 MCG: 50 INJECTION INTRAMUSCULAR; INTRAVENOUS at 07:54

## 2017-01-13 RX ADMIN — AMIODARONE HYDROCHLORIDE 200 MG: 200 TABLET ORAL at 03:54

## 2017-01-13 RX ADMIN — ROCURONIUM BROMIDE 50 MG: 10 INJECTION INTRAVENOUS at 10:39

## 2017-01-13 RX ADMIN — VECURONIUM BROMIDE 4 MG: 1 INJECTION, POWDER, LYOPHILIZED, FOR SOLUTION INTRAVENOUS at 08:27

## 2017-01-13 RX ADMIN — ACETAMINOPHEN 650 MG: 325 TABLET ORAL at 20:08

## 2017-01-13 RX ADMIN — OXYCODONE HYDROCHLORIDE 10 MG: 5 TABLET ORAL at 18:57

## 2017-01-13 RX ADMIN — CEFAZOLIN SODIUM 2 G: 2 INJECTION, SOLUTION INTRAVENOUS at 07:41

## 2017-01-13 RX ADMIN — FENTANYL CITRATE 100 MCG: 50 INJECTION INTRAMUSCULAR; INTRAVENOUS at 07:08

## 2017-01-13 RX ADMIN — PROTAMINE SULFATE 50 MG: 10 INJECTION, SOLUTION INTRAVENOUS at 12:04

## 2017-01-13 RX ADMIN — MAGNESIUM SULFATE HEPTAHYDRATE 1 G: 1 INJECTION, SOLUTION INTRAVENOUS at 20:07

## 2017-01-13 RX ADMIN — PROPOFOL 100 MG: 10 INJECTION, EMULSION INTRAVENOUS at 07:08

## 2017-01-13 RX ADMIN — EPHEDRINE SULFATE 10 MG: 50 INJECTION INTRAMUSCULAR; INTRAVENOUS; SUBCUTANEOUS at 07:08

## 2017-01-13 RX ADMIN — FENTANYL CITRATE 50 MCG: 50 INJECTION INTRAMUSCULAR; INTRAVENOUS at 06:58

## 2017-01-13 RX ADMIN — ALBUMIN HUMAN 250 ML: 0.05 INJECTION, SOLUTION INTRAVENOUS at 12:26

## 2017-01-13 RX ADMIN — SODIUM CHLORIDE 2.8 UNITS/HR: 9 INJECTION, SOLUTION INTRAVENOUS at 07:46

## 2017-01-13 RX ADMIN — NITROGLYCERIN 50 MCG: 5 INJECTION, SOLUTION INTRAVENOUS at 11:11

## 2017-01-13 RX ADMIN — VECURONIUM BROMIDE 10 MG: 1 INJECTION, POWDER, LYOPHILIZED, FOR SOLUTION INTRAVENOUS at 07:08

## 2017-01-13 RX ADMIN — MIDAZOLAM HYDROCHLORIDE 2 MG: 1 INJECTION, SOLUTION INTRAMUSCULAR; INTRAVENOUS at 06:58

## 2017-01-13 RX ADMIN — ALPRAZOLAM 0.25 MG: 0.25 TABLET ORAL at 20:58

## 2017-01-13 RX ADMIN — VECURONIUM BROMIDE 4 MG: 1 INJECTION, POWDER, LYOPHILIZED, FOR SOLUTION INTRAVENOUS at 09:25

## 2017-01-13 RX ADMIN — METOPROLOL TARTRATE 25 MG: 25 TABLET ORAL at 06:12

## 2017-01-13 RX ADMIN — CEFAZOLIN SODIUM 2 G: 2 INJECTION, SOLUTION INTRAVENOUS at 10:47

## 2017-01-13 RX ADMIN — NITROGLYCERIN 0.2 MCG/KG/MIN: 5 INJECTION, SOLUTION INTRAVENOUS at 08:10

## 2017-01-13 RX ADMIN — ATORVASTATIN CALCIUM 40 MG: 40 TABLET, FILM COATED ORAL at 23:23

## 2017-01-13 RX ADMIN — FENTANYL CITRATE 250 MCG: 50 INJECTION INTRAMUSCULAR; INTRAVENOUS at 10:54

## 2017-01-13 RX ADMIN — FAMOTIDINE 20 MG: 10 INJECTION, SOLUTION INTRAVENOUS at 06:13

## 2017-01-13 RX ADMIN — SODIUM CHLORIDE 15 ML/HR: 9 INJECTION, SOLUTION INTRAVENOUS at 12:08

## 2017-01-13 RX ADMIN — PROPOFOL 50 MCG/KG/MIN: 10 INJECTION, EMULSION INTRAVENOUS at 09:00

## 2017-01-13 RX ADMIN — MAGNESIUM SULFATE HEPTAHYDRATE 2 G: 500 INJECTION, SOLUTION INTRAMUSCULAR; INTRAVENOUS at 10:28

## 2017-01-13 RX ADMIN — FENTANYL CITRATE 250 MCG: 50 INJECTION INTRAMUSCULAR; INTRAVENOUS at 09:30

## 2017-01-13 RX ADMIN — SODIUM CHLORIDE 30 ML/HR: 9 INJECTION, SOLUTION INTRAVENOUS at 11:40

## 2017-01-13 RX ADMIN — NICARDIPINE HYDROCHLORIDE 3 MG/HR: 25 INJECTION INTRAVENOUS at 11:40

## 2017-01-13 RX ADMIN — FENTANYL CITRATE 250 MCG: 50 INJECTION INTRAMUSCULAR; INTRAVENOUS at 07:59

## 2017-01-13 RX ADMIN — Medication 0.02 MCG/KG/MIN: at 12:32

## 2017-01-13 RX ADMIN — METOCLOPRAMIDE 10 MG: 5 INJECTION, SOLUTION INTRAMUSCULAR; INTRAVENOUS at 12:03

## 2017-01-13 RX ADMIN — PHENYLEPHRINE HYDROCHLORIDE 50 MCG: 10 INJECTION INTRAVENOUS at 09:00

## 2017-01-13 RX ADMIN — PROPOFOL 50 MCG/KG/MIN: 10 INJECTION, EMULSION INTRAVENOUS at 14:00

## 2017-01-13 RX ADMIN — ALBUMIN HUMAN 500 ML: 0.05 INJECTION, SOLUTION INTRAVENOUS at 17:00

## 2017-01-13 RX ADMIN — MORPHINE SULFATE 4 MG: 4 INJECTION, SOLUTION INTRAMUSCULAR; INTRAVENOUS at 15:00

## 2017-01-13 RX ADMIN — PHENYLEPHRINE HYDROCHLORIDE 100 MCG: 10 INJECTION INTRAVENOUS at 07:08

## 2017-01-13 RX ADMIN — MORPHINE SULFATE 4 MG: 4 INJECTION, SOLUTION INTRAMUSCULAR; INTRAVENOUS at 18:36

## 2017-01-13 RX ADMIN — PHENYLEPHRINE HYDROCHLORIDE 0.12 MCG/KG/MIN: 10 INJECTION, SOLUTION INTRAMUSCULAR; INTRAVENOUS; SUBCUTANEOUS at 08:22

## 2017-01-13 RX ADMIN — MORPHINE SULFATE 2 MG: 4 INJECTION, SOLUTION INTRAMUSCULAR; INTRAVENOUS at 16:56

## 2017-01-13 RX ADMIN — MIDAZOLAM HYDROCHLORIDE 5 MG: 1 INJECTION, SOLUTION INTRAMUSCULAR; INTRAVENOUS at 10:39

## 2017-01-13 RX ADMIN — SODIUM CHLORIDE: 9 INJECTION, SOLUTION INTRAVENOUS at 06:50

## 2017-01-13 NOTE — ANESTHESIA PROCEDURE NOTES
Central Line    Patient location during procedure: OR  Indications: vascular access  Staff  Anesthesiologist: LORE CARRASQUILLO  Preanesthetic Checklist  Completed: patient identified, site marked, surgical consent, pre-op evaluation, timeout performed, IV checked, risks and benefits discussed and monitors and equipment checked  Central Line Prep  Sterile Tech:cap, gloves, gown, mask and sterile barriers  Prep: chloraprep  Patient monitoring: blood pressure monitoring, continuous pulse oximetry and EKG  Central Line Procedure  Laterality:right  Location:internal jugular  Catheter Type:[place]-Siva, Cordis and double lumen  Catheter Size:9 Fr  Assessment  Post procedure:biopatch applied, line sutured and occlusive dressing applied  Assessement:blood return through all ports, free fluid flow and Tod Test  Complications:no  Patient Tolerance:patient tolerated the procedure well with no apparent complications

## 2017-01-13 NOTE — PLAN OF CARE
Problem: Cardiac Catheterization with/without PCI (Adult)  Goal: Signs and Symptoms of Listed Potential Problems Will be Absent or Manageable (Cardiac Catheterization with/without PCI)  Outcome: Ongoing (interventions implemented as appropriate)

## 2017-01-13 NOTE — OP NOTE
[facility] CARDIAC SURGERY OP NOTE    Preop Diagnosis: Severe coronary artery disease.  Insulin-dependent diabetes.  Heavy smoker with nicotine dependency.    Postop Diagnosis: Same    Procedure: CABG ×7.  Left internal mammary graft to proximal LAD.  Sequential vein graft to acute marginal branch and right coronary artery.  Vein graft first marginal branch of circumflex and separate graft to third marginal branch of circumflex.  Vein graft sequentially to D1 and D2.  Endoscopic vein harvest of the left greater saphenous vein.  Temporary cardiopulmonary bypass.  With antegrade and retrograde cold blood cardioplegia and warm reperfusion.  Neurologic monitoring.  Transesophageal echo.    Surgeon: Alexandre Farrell MD    Assisistant: Ayah Godfrey CFA    Anesthesia: GET    Findings : This patient was admitted with unstable angina.  He had severe multivessel coronary disease and because of the diabetes it was felt that bypass was a better choice for him rather than stents.  The LAD had a moderate lesion but was significant by FloWire at 0.7.  Operation was advisable prolong life and relieve symptoms.  The STS risk was discussed with the patient and family.  Initially thought we might due to JACINTA's but because of his insulin-dependent diabetes, smoking history and the character of his vessels we did 1 JACINTA.  The acute marginal branch was 2 mm the distal right was 2.5 mm the third marginal was 1.5 mm the first marginal was 2 mm the 2 diagonal branches were 1.5 mm.  The LAD was 2.5 mm.  The vein was harvested with the endoscope and was 4-4 and half millimeters and good quality.  The JACINTA was a beautiful 3 mm vessel with excellent blood flow.  The heart and cardiac chambers were normal there was trace mitral incompetence and this went away postoperatively.  LV function was normal at 60%.    Operative Procedure: A primary median sternotomy was made while the left greater saphenous vein was  harvested with the endoscope.  Cardio pulmonary bypass was established for 84 minutes drifting 34°C at appropriate flow rates.  The aorta was crossclamped for 70 minutes and we gave 900 cc of antegrade cold blood cardioplegia and then a liter and half a retrograde cold blood cardioplegia repeating doses every 10-15 minutes to good effect.  4 veins were anastomosed the ascending aorta with 6-0 proline and marked with washers.  The first vein was sewn side to side to the acute marginal branch and its into the distal right with 7-0 proline.  The next 2 veins were sewn to the marginal branches with 7-0 proline.  The last vein was sewn side to side to D1 and its into D2 with 7-0 proline.  The mammary was brought through an incision in the left side the pericardium away from the phrenic nerve and sewn into side to the proximal LAD.  A warm dose of retrograde cardioplegia was given and there was strong suction on the aortic needle vent the cross-clamp was released.  Complete de-airing was performed.  Cardio pulmonary bypass was then weaned and discontinued.  He regained spontaneous sinus rhythm but was bradycardic so we atrially paced at 80.  Decannulation was affected usual devices were placed and hemostasis was obtained.  All the grafts lay nicely and all the anastomoses were hemostatic.  3 chest tubes were inserted and we applied vancomycin enriched by rich plasma.  The sternum was closed with #7 and double #8 and double #6 wire.  Skin was closed as usual.  He went the open-heart recovery room in good condition.    Complications: None    Tubes: 3    Epicardial Wires: 3    Blood Loss: 100 ml    Aortic X time: 70 min    CPB time: 84 min     Specimen: None      Condition: Good      Patient Care Team:  Rose Marie Farr MD as PCP - General (Internal Medicine)        Alexandre Farrell MD  1/13/2017  11:23 AM      EMR Dragon/Transcription disclaimer:   Much of this encounter note is an electronic transcription/translation  of spoken language to printed text. The electronic translation of spoken language may permit erroneous, or at times, nonsensical words or phrases to be inadvertently transcribed; Although I have reviewed the note for such errors, some may still exist.

## 2017-01-13 NOTE — ANESTHESIA PROCEDURE NOTES
Airway  Urgency: elective    Airway not difficult    General Information and Staff    Patient location during procedure: OR  Anesthesiologist: OLRE CARRASQUILLO    Indications and Patient Condition  Indications for airway management: airway protection    Preoxygenated: yes  MILS maintained throughout  Mask difficulty assessment: 1 - vent by mask    Final Airway Details  Final airway type: endotracheal airway      Successful airway: ETT  Cuffed: yes   Successful intubation technique: direct laryngoscopy  Endotracheal tube insertion site: oral  Blade: Bianca  Blade size: #3  ETT size: 8.5 mm  Cormack-Lehane Classification: grade I - full view of glottis  Placement verified by: chest auscultation and capnometry   Measured from: lips  ETT to lips (cm): 23  Number of attempts at approach: 1

## 2017-01-13 NOTE — PAYOR COMM NOTE
"Carmine Savage (46 y.o. Male)          ATTENTION; NURSE REVIEW , PENDING AUTH [id number], CLINICALS FOR YOUR REVIEW , REPLY TO UR DEPT         HALLE YEN LPN   [phone] ,          Date of Birth Social Security Number Address Home Phone MRN    1970  [id number] [place] [address] [phone] [id number]    Oriental orthodox Marital Status          [employer]        Admission Date Admission Type Admitting Provider Attending Provider Department, Room/Bed    17 Emergency Quincy Carolina MD Imburgia, Michael, MD [facility] CARDIO RECOVERY,     Discharge Date Discharge Disposition Discharge Destination                      Attending Provider: Quincy Carolina MD     Allergies:  No Known Allergies    Isolation:  None   Infection:  None   Code Status:  FULL    Ht:  73\" (185.4 cm)   Wt:  234 lb (106 kg)    Admission Cmt:  None   Principal Problem:  None                Active Insurance as of 2017     Primary Coverage     Payor Plan Insurance Group Employer/Plan Group    HUMANA MEDICAID HUMANA [facility] [id number]     Payor Plan Address Payor Plan Phone Number Effective From Effective To    PO  [phone] 2017     [place] [address]       Subscriber Name Subscriber Birth Date Member ID       CARMINE SAVAGE 1970 [id number]                 Emergency Contacts      (Rel.) Home Phone Work Phone Mobile Phone    Alma Norton (Spouse) [phone] -- [phone]               History & Physical      Quincy Carolina MD at 2017  6:57 AM          Date of Hospital Visit:17  Encounter Provider: Quincy Carolina MD  Place of Service: [facility] CARDIOLOGY  Patient Name: Carmine Savage  :1970    Chief complaint: Chest pain    History of Present Illness: Mr. Savage is a 46 year old gentleman with a documented history of coronary artery disease/MI s/p PCI in , diabetes, and " hypertension. Does not follow a cardiologist.     Presented to ED with substernal chest pressure that started last night and progressively became worse associated with shortness of breath and diaphoresis. Pain is similar as with his MI.  He took ASA and was given NTG by EMS with some relief. Upon presentation to the ED his BP was 165/81, HR 90 (SR) and SPO2 95 % on RA. His initial troponin was negative.  He does state that the pain is similar to what he had before not quite as severe.  Last probably about an hour.  He denies any prior exertional pain or pressure.    Previous testing: In [place]     Past Medical History   Diagnosis Date   • Diabetes mellitus    • Heart attack 2007     STENTS X 2 PLACED [place]   • Hypertension        Past Surgical History   Procedure Laterality Date   • Coronary angioplasty with stent placement  2007       No current facility-administered medications on file prior to encounter.      No current outpatient prescriptions on file prior to encounter.       Social History     Social History   • Marital status:      Spouse name: N/A   • Number of children: N/A   • Years of education: N/A     Occupational History   • Not on file.     Social History Main Topics   • Smoking status: Current Every Day Smoker     Packs/day: 1.00     Types: Cigarettes   • Smokeless tobacco: Not on file   • Alcohol use No   • Drug use: No   • Sexual activity: Not on file     Other Topics Concern   • Not on file     Social History Narrative   • No narrative on file     Family medical history.  Father with coronary disease and diabetes.  No family history of strokes or cancer.    REVIEW OF SYSTEMS:   All ROS was performed and is Negative except as outlined in HPI    Objective:     Vitals:    01/12/17 0148 01/12/17 0223 01/12/17 0300 01/12/17 0350   BP: 126/69 131/80 127/78 135/71   BP Location: Right arm Right arm Right arm Right arm   Patient Position: Lying Lying Lying Lying   Pulse: 73 69 67 65  "  Resp: 16 16 16 18   Temp:    97.5 °F (36.4 °C)   TempSrc:    Oral   SpO2: 97% 97% 97% 97%   Weight:    240 lb (109 kg)   Height:    73\" (185.4 cm)     Body mass index is 31.66 kg/(m^2).  Flowsheet Rows         First Filed Value    Admission Height  73\" (185.4 cm) Documented at 01/12/2017 0102    Admission Weight  245 lb (111 kg) Documented at 01/12/2017 0102          Physical Exam   Constitutional: Oriented to person, place, and time. Well-developed and well-nourished. No distress.   HENT:   Head: Normocephalic.   Eyes: Conjunctivae are normal. Pupils are equal, round, and reactive to light. No scleral icterus.   Neck: Normal carotid pulses, no hepatojugular reflux and no JVD present. Carotid bruit is not present. No tracheal deviation, no edema and no erythema present. No thyromegaly present.   Cardiovascular: Normal rate, regular rhythm, S1 normal, S2 normal, normal heart sounds and intact distal pulses.   No extrasystoles are present. PMI is not displaced.  Exam reveals no gallop, no distant heart sounds and no friction rub.    No murmur heard.  Pulses:       Carotid pulses are 2+ on the right side, and 2+ on the left side.       Radial pulses are 2+ on the right side, and 2+ on the left side.        Femoral pulses are 2+ on the right side, and 2+ on the left side.       Dorsalis pedis pulses are 2+ on the right side, and 2+ on the left side.        Posterior tibial pulses are 2+ on the right side, and 2+ on the left side.   Pulmonary/Chest: Effort normal and breath sounds normal. No respiratory distress. There are no decreased breath sounds. No wheezes. No rhonchi. No rales. No tenderness.   Abdominal: Soft. Bowel sounds are normal. She exhibits no distension and no mass. There is no hepatosplenomegaly. There is no tenderness. There is no rebound and no guarding.   Musculoskeletal: There is no edema, tenderness or deformity.   Neurological: Alert and oriented to person, place, and time.   Skin: Skin is warm " and dry. No rash noted. She is not diaphoretic. No cyanosis or erythema. No pallor. Nails show no clubbing.   Psychiatric: Normal mood and affect. Speech is normal and behavior is normal. Judgment and thought content normal.     Lab Review:                  Results from last 7 days  Lab Units 01/12/17  0110   SODIUM mmol/L 140   POTASSIUM mmol/L 4.0   CHLORIDE mmol/L 100   TOTAL CO2 mmol/L 26.3   BUN mg/dL 16   CREATININE mg/dL 1.08   GLUCOSE mg/dL 234*   CALCIUM mg/dL 9.6       Results from last 7 days  Lab Units 01/12/17  0110   TROPONIN T ng/mL <0.010       Results from last 7 days  Lab Units 01/12/17  0110   WBC 10*3/mm3 8.05   HEMOGLOBIN g/dL 15.7   HEMATOCRIT % 43.6   PLATELETS 10*3/mm3 200                          I personally viewed and interpreted the patient's EKG/Telemetry data    Assessment:   1.  Pleasant 46-year-old gentleman with history of coronary disease previous stents placed in [place] in 2007.  Now with symptoms at least compatible with unstable angina.  His ECG appears to have had an old inferior infarct.  At this time I think be best to proceed with cardiac catheterization he seems agreeable and understands those risks.  2.  History of diabetes mellitus follows with his primary care provider at [facility].  3.  History of hypertension continue home therapy.  4. History of hyperlipidemia he is on atorvastatin will need to increase that dose.        Plan:            Electronically signed by Quincy Carolina MD at 1/12/2017  7:15 AM           Emergency Department Notes      Tae Emanuel MD at 1/12/2017  1:09 AM           EMERGENCY DEPARTMENT ENCOUNTER    CHIEF COMPLAINT  Chief Complaint: chest pain  History given by: patient  History limited by: none  Room Number: 2200/1  PMD: Rose Marie Farr MD      HPI:  Pt is a 46 y.o. male who presents complaining of substernal chest pain onset tonight around 11:30 PM while at rest. He had mild shortness of breath with the pain  and some diaphoresis. He reports some mild numbness in distal legs. He did not have any nausea or vomiting. At current, the pain is much improved. Pt took Asprin and was given NTG prior to arrival by EMS. Pt had an MI in 2007 in [place], and had two stents placed. He denies any cardiac issues since. He has no other complaints at this time.    Duration:  About 1.5 hours  Onset: 11:30 PM, at rest  Timing: constant  Location: substernal  Radiation: none stated  Quality: burning  Intensity/Severity: moderate, mild at current  Progression: improved  Associated Symptoms: mild SOB, diaphoresis  Aggravating Factors: none stated  Alleviating Factors: Asprin, NTG  Previous Episodes: MI in 2007 w/ similar sx  Treatment before arrival: Asprin, NTG    PAST MEDICAL HISTORY  Active Ambulatory Problems     Diagnosis Date Noted   • No Active Ambulatory Problems     Resolved Ambulatory Problems     Diagnosis Date Noted   • No Resolved Ambulatory Problems     Past Medical History   Diagnosis Date   • Diabetes mellitus    • Heart attack 2007   • Hypertension        PAST SURGICAL HISTORY  Past Surgical History   Procedure Laterality Date   • Coronary angioplasty with stent placement  2007       FAMILY HISTORY  History reviewed. No pertinent family history.    SOCIAL HISTORY  Social History     Social History   • Marital status:      Spouse name: N/A   • Number of children: N/A   • Years of education: N/A     Occupational History   • Not on file.     Social History Main Topics   • Smoking status: Current Every Day Smoker     Packs/day: 1.00     Types: Cigarettes   • Smokeless tobacco: Not on file   • Alcohol use No   • Drug use: No   • Sexual activity: Not on file     Other Topics Concern   • Not on file     Social History Narrative   • No narrative on file       ALLERGIES  Review of patient's allergies indicates no known allergies.    REVIEW OF SYSTEMS  Review of Systems   Constitutional: Positive for diaphoresis. Negative for  activity change, appetite change and fever.   HENT: Negative for congestion and sore throat.    Eyes: Negative.    Respiratory: Positive for shortness of breath (mild). Negative for cough.    Cardiovascular: Positive for chest pain. Negative for leg swelling.   Gastrointestinal: Negative for abdominal pain, diarrhea and vomiting.   Endocrine: Negative.    Genitourinary: Negative for decreased urine volume and dysuria.   Musculoskeletal: Negative for neck pain.   Skin: Negative for rash and wound.   Allergic/Immunologic: Negative.    Neurological: Negative for weakness, numbness and headaches.   Hematological: Negative.    Psychiatric/Behavioral: Negative.    All other systems reviewed and are negative.      PHYSICAL EXAM  ED Triage Vitals   Temp Heart Rate Resp BP SpO2   -- 01/12/17 0102 01/12/17 0102 01/12/17 0102 01/12/17 0102    90 16 165/81 94 %      Temp src Heart Rate Source Patient Position BP Location FiO2 (%)   -- -- -- -- --              Physical Exam   Constitutional: He is oriented to person, place, and time and well-developed, well-nourished, and in no distress.   HENT:   Head: Normocephalic and atraumatic.   Eyes: EOM are normal. Pupils are equal, round, and reactive to light.   Neck: Normal range of motion. Neck supple.   Cardiovascular: Normal rate, regular rhythm and normal heart sounds.    Pulmonary/Chest: Effort normal and breath sounds normal. No respiratory distress.   Abdominal: Soft. There is no tenderness. There is no rebound and no guarding.   Musculoskeletal: Normal range of motion. He exhibits no edema.   Neurological: He is alert and oriented to person, place, and time. He has normal sensation and normal strength.   Skin: Skin is warm and dry.   Psychiatric: Mood and affect normal.   Nursing note and vitals reviewed.      LAB RESULTS  Lab Results (last 24 hours)     Procedure Component Value Units Date/Time    CBC & Differential [[id number]] Collected:  01/12/17 0110    Specimen:  Blood  Updated:  01/12/17 0125    Narrative:       The following orders were created for panel order CBC & Differential.  Procedure                               Abnormality         Status                     ---------                               -----------         ------                     CBC Auto Differential[[id number]]         Abnormal            Final result                 Please view results for these tests on the individual orders.    Comprehensive Metabolic Panel [[id number]]  (Abnormal) Collected:  01/12/17 0110    Specimen:  Blood Updated:  01/12/17 0143     Glucose 234 (H) mg/dL      BUN 16 mg/dL      Creatinine 1.08 mg/dL      Sodium 140 mmol/L      Potassium 4.0 mmol/L      Chloride 100 mmol/L      CO2 26.3 mmol/L      Calcium 9.6 mg/dL      Total Protein 7.1 g/dL      Albumin 4.30 g/dL      ALT (SGPT) 48 (H) U/L      AST (SGOT) 31 U/L      Alkaline Phosphatase 77 U/L      Total Bilirubin 0.3 mg/dL      eGFR Non African Amer 73 mL/min/1.73      Globulin 2.8 gm/dL      A/G Ratio 1.5 g/dL      BUN/Creatinine Ratio 14.8      Anion Gap 13.7 mmol/L     Troponin [[id number]]  (Normal) Collected:  01/12/17 0110    Specimen:  Blood Updated:  01/12/17 0143     Troponin T <0.010 ng/mL     Narrative:       Troponin T Reference Ranges:  Less than 0.03 ng/mL:    Negative for AMI  0.03 to 0.09 ng/mL:      Indeterminant for AMI  Greater than 0.09 ng/mL: Positive for AMI    CBC Auto Differential [[id number]]  (Abnormal) Collected:  01/12/17 0110    Specimen:  Blood Updated:  01/12/17 0125     WBC 8.05 10*3/mm3      RBC 4.40 (L) 10*6/mm3      Hemoglobin 15.7 g/dL      Hematocrit 43.6 %      MCV 99.1 (H) fL      MCH 35.7 (H) pg      MCHC 36.0 g/dL      RDW 11.5 %      RDW-SD 41.8 fl      MPV 10.3 fL      Platelets 200 10*3/mm3      Neutrophil % 41.0 (L) %      Lymphocyte % 46.3 (H) %      Monocyte % 9.2 %      Eosinophil % 3.1 %      Basophil % 0.4 %      Immature Grans % 0.0 %      Neutrophils, Absolute 3.30 10*3/mm3       Lymphocytes, Absolute 3.73 10*3/mm3      Monocytes, Absolute 0.74 10*3/mm3      Eosinophils, Absolute 0.25 10*3/mm3      Basophils, Absolute 0.03 10*3/mm3      Immature Grans, Absolute 0.00 10*3/mm3           I ordered the above labs and reviewed the results    RADIOLOGY  XR Chest 2 View    (Results Pending)        I ordered the above noted radiological studies. Interpreted by radiologist. Reviewed by me in PACS.       PROCEDURES  Procedures    EKG         EKG time: 0106  Rhythm/Rate: NSR, rate 83  P waves and MN: normal  QRS, axis: normal   ST and T waves: normal   Interpreted Contemporaneously by me, independently viewed  No prior EKG available for comparison      PROGRESS AND CONSULTS  ED Course     0116 - EKG shows no acute changes, will order labs including troponin for further evaluation. NTG ordered for pain relief.    0216 - Pt rechecked, he is currently pain free and resting comfortably. Informed him of plan for admission for cardiac rule out. Pt understands and agrees with the plan. All questions answered.    0219 - Call w/ Dr. Abdi who agrees to admit the pt.      MEDICAL DECISION MAKING  Results were reviewed/discussed with the patient and they were also made aware of online access. Pt also made aware that some labs, such as cultures, will not be resulted during ER visit and follow up with PMD is necessary.     MDM  Number of Diagnoses or Management Options     Amount and/or Complexity of Data Reviewed  Clinical lab tests: ordered and reviewed  Tests in the radiology section of CPT®:  ordered and reviewed  Tests in the medicine section of CPT®:  ordered and reviewed  Discuss the patient with other providers: yes  Independent visualization of images, tracings, or specimens: yes    Patient Progress  Patient progress: stable    HEART Score  History: Moderately suspicious (+1)  ECG: Normal (+0)  Age: 45 through 65 (+1)  Risk Factors: 3 or more risk factors OR history of atherosclerotic disease  (+2)  Troponin: Normal limit or lower (+0)  Total: 4      DIAGNOSIS  Final diagnoses:   Chest pain at rest   Angina at rest       DISPOSITION  ADMISSION: Patient admitted by Dr. Abdi to telemetry.    Discussed treatment plan and reason for admission with pt/family and admitting physician.  Pt/family voiced understanding of the plan for admission for further testing/treatment as needed.       Latest Documented Vital Signs:  As of 5:00 AM  BP- 135/71 HR- 65 Temp- 97.5 °F (36.4 °C) (Oral) O2 sat- 97%    --  Documentation assistance provided by petey Emanuel MD for Dr. Tae Emanuel.  Information recorded by the scribe was done at my direction and has been verified and validated by me.       Maikel Hansen  01/12/17 0223       Tae Emanuel MD  01/12/17 0500       Electronically signed by Tae Emanuel MD at 1/12/2017  5:00 AM        Vital Signs (last 24 hours)       01/12 0700  -  01/13 0659 01/13 0700  -  01/13 1339   Most Recent    Temp (°F) 97.9 -  98.4       98.4 (36.9)    Heart Rate 59 -  69      80     80    Resp 16 -  20      12     12    /65 -  143/88       142/77    SpO2 (%) 95 -  97    98 -  100     99          Lines, Drains & Airways    Active LDAs     Name:   Placement date:   Placement time:   Site:   Days:    Pulmonary Artery Catheter - Triple Lumen 01/13/17 0802  01/13/17    0802 created via procedure documentation    less than 1    Peripheral IV Line - Single Lumen 01/12/17 0131 median cubital vein (antecubital fossa), right 20 gauge;1 in length  01/12/17    0131      1    Y Chest Tube 1 and 2 01/13/17 1102  01/13/17    1102      less than 1    Chest Tube 01/13/17 1101 chest tube placed by physician other (see comments)  01/13/17    1101      less than 1    Urethral Catheter 01/13/17 0715 100% silicone 16 10 10  01/13/17    0715      less than 1    Airway 01/13/17 0802 endotracheal tube with subglottic suction  01/13/17    0802 created via procedure documentation     less than 1    Arterial Line 01/13/17 0701 Left radial artery  01/13/17    0701 created via procedure documentation    less than 1         Inactive LDAs     Name:   Placement date:   Placement time:   Removal date:   Removal time:   Site:   Days:    [REMOVED] Peripheral IV Line - Single Lumen 01/12/17 0130 median cubital vein (antecubital fossa), left 20 gauge  01/12/17    0130    01/12/17    1100      less than 1    [REMOVED] Arterial Sheath 6 Fr. Right Radial  01/12/17    0840    01/12/17    0937    Radial    less than 1                   Operative/Procedure Notes (last 24 hours) (Notes from 1/12/2017  1:39 PM through 1/13/2017  1:39 PM)      Alexandre Farrell MD at 1/13/2017 11:23 AM  Version 1 of 1                                            [facility] CARDIAC SURGERY OP NOTE    Preop Diagnosis: Severe coronary artery disease.  Insulin-dependent diabetes.  Heavy smoker with nicotine dependency.    Postop Diagnosis: Same    Procedure: CABG ×7.  Left internal mammary graft to proximal LAD.  Sequential vein graft to acute marginal branch and right coronary artery.  Vein graft first marginal branch of circumflex and separate graft to third marginal branch of circumflex.  Vein graft sequentially to D1 and D2.  Endoscopic vein harvest of the left greater saphenous vein.  Temporary cardiopulmonary bypass.  With antegrade and retrograde cold blood cardioplegia and warm reperfusion.  Neurologic monitoring.  Transesophageal echo.    Surgeon: Alexandre Farrell MD    Assisistant: Ayah Godfrey CFA    Anesthesia: GET    Findings : This patient was admitted with unstable angina.  He had severe multivessel coronary disease and because of the diabetes it was felt that bypass was a better choice for him rather than stents.  The LAD had a moderate lesion but was significant by FloWire at 0.7.  Operation was advisable prolong life and relieve symptoms.  The STS risk was discussed with the patient and family.  Initially thought we  might due to JACINTA's but because of his insulin-dependent diabetes, smoking history and the character of his vessels we did 1 JACINTA.  The acute marginal branch was 2 mm the distal right was 2.5 mm the third marginal was 1.5 mm the first marginal was 2 mm the 2 diagonal branches were 1.5 mm.  The LAD was 2.5 mm.  The vein was harvested with the endoscope and was 4-4 and half millimeters and good quality.  The JACINTA was a beautiful 3 mm vessel with excellent blood flow.  The heart and cardiac chambers were normal there was trace mitral incompetence and this went away postoperatively.  LV function was normal at 60%.    Operative Procedure: A primary median sternotomy was made while the left greater saphenous vein was harvested with the endoscope.  Cardio pulmonary bypass was established for 84 minutes drifting 34°C at appropriate flow rates.  The aorta was crossclamped for 70 minutes and we gave 900 cc of antegrade cold blood cardioplegia and then a liter and half a retrograde cold blood cardioplegia repeating doses every 10-15 minutes to good effect.  4 veins were anastomosed the ascending aorta with 6-0 proline and marked with washers.  The first vein was sewn side to side to the acute marginal branch and its into the distal right with 7-0 proline.  The next 2 veins were sewn to the marginal branches with 7-0 proline.  The last vein was sewn side to side to D1 and its into D2 with 7-0 proline.  The mammary was brought through an incision in the left side the pericardium away from the phrenic nerve and sewn into side to the proximal LAD.  A warm dose of retrograde cardioplegia was given and there was strong suction on the aortic needle vent the cross-clamp was released.  Complete de-airing was performed.  Cardio pulmonary bypass was then weaned and discontinued.  He regained spontaneous sinus rhythm but was bradycardic so we atrially paced at 80.  Decannulation was affected usual devices were placed and hemostasis was  obtained.  All the grafts lay nicely and all the anastomoses were hemostatic.  3 chest tubes were inserted and we applied vancomycin enriched by rich plasma.  The sternum was closed with #7 and double #8 and double #6 wire.  Skin was closed as usual.  He went the open-heart recovery room in good condition.    Complications: None    Tubes: 3    Epicardial Wires: 3    Blood Loss: 100 ml    Aortic X time: 70 min    CPB time: 84 min     Specimen: None      Condition: Good      Patient Care Team:  Rose Marie Farr MD as PCP - General (Internal Medicine)        Alexandre Farrell MD  1/13/2017  11:23 AM      EMR Dragon/Transcription disclaimer:   Much of this encounter note is an electronic transcription/translation of spoken language to printed text. The electronic translation of spoken language may permit erroneous, or at times, nonsensical words or phrases to be inadvertently transcribed; Although I have reviewed the note for such errors, some may still exist.       Electronically signed by Alexandre Farrell MD at 1/13/2017 11:29 AM               Consults  Date of Service: 1/12/2017 11:57 AM  CARLYLE Blakely   Cardiothoracic Surgery   Cosigned by: Alexandre Farrell MD at 1/12/2017  2:25 PM   Consult Orders:   1. Inpatient Consult to Cardiothoracic Surgery [[id number]] ordered by Diandra Suarez MD at 01/12/17 1003   Attestation signed by Alexandre Farrell MD at 1/12/2017 2:25 PM   Best option for survival and symptoms is CABG. Patient considering. Long discussion with patient and family.      Expand All Collapse All    []Hide copied text     Patient Care Team:  Rose Marie Farr MD as PCP - General (Internal Medicine)     Chief complaint: Chest pain     History of Present Illness:  Mr. Savage is a 46 year-old male with PMH of CAD with prior PCI in 2008, DM, HTN, HLD, and tobacco abuse, who presented to the ED this morning with a chief complaint of chest pain. He describes the chest pain as a pressure  that started last night and became progressively worse. It was associated with shortness of breath and diaphoresis and was similar to the chest pain he had in 2008 prior to his stents. He took an aspirin and was given Nitroglycerin by EMS, which gave him some relief. 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 He was seen by Cardiology and underwent cardiac catheterization this morning, which revealed EF 55% and severe 3 vessel CAD. Cardiac Surgery has been consulted for surgical evaluation.      Review of Systems   Constitutional: Positive for activity change, diaphoresis and fatigue.   Respiratory: Positive for chest tightness and shortness of breath.   Cardiovascular: Positive for chest pain.   All other systems reviewed and are negative.         Medical History          Past Medical History   Diagnosis Date   • Diabetes mellitus     • Heart attack 2007       STENTS X 2 PLACED IN COUNTRY OF [place]   • Hypertension            Surgical History          Past Surgical History   Procedure Laterality Date   • Coronary angioplasty with stent placement   2007         History reviewed. No pertinent family history.        Social History   Substance Use Topics   • Smoking status: Current Every Day Smoker       Packs/day: 1.00       Types: Cigarettes   • Smokeless tobacco: None   • Alcohol use No       Prescriptions Prior to Admission            Prescriptions Prior to Admission   Medication Sig Dispense Refill Last Dose   • aspirin 325 MG tablet Take 325 mg by mouth Daily.         • atorvastatin (LIPITOR) 20 MG tablet Take 20 mg by mouth Daily.         • glimepiride (AMARYL) 2 MG tablet Take 2 mg by mouth 2 (Two) Times a Day.         • lisinopril (PRINIVIL,ZESTRIL) 20 MG tablet Take 20 mg by mouth Daily.         • raNITIdine (ZANTAC) 150 MG tablet Take 150 mg by mouth 2 (Two) Times a Day.         • sitaGLIPtin-metFORMIN (JANUMET)  MG per tablet Take 1 tablet by mouth 2 (Two) Times  "a Day With Meals.                  [START ON 1/13/2017] aspirin 325 mg Oral Daily   atorvastatin 40 mg Oral Nightly   famotidine 20 mg Oral BID   glimepiride 2 mg Oral BID With Meals   lisinopril 20 mg Oral Daily      Allergies:  Review of patient's allergies indicates no known allergies.     Objective        Vital Signs  Temp: [97.5 °F (36.4 °C)-98.5 °F (36.9 °C)] 97.5 °F (36.4 °C)  Heart Rate: [61-98] 63  Resp: [16-18] 16  BP: (125-165)/(69-97) 134/97     Flowsheet Rows           First Filed Value     Admission Height   73\" (185.4 cm) Documented at 01/12/2017 0102     Admission Weight   245 lb (111 kg) Documented at 01/12/2017 0102         73\" (185.4 cm)     Physical Exam   Constitutional: He is oriented to person, place, and time. He appears well-developed and well-nourished. No distress.   HENT:   Head: Normocephalic and atraumatic.   Eyes: EOM are normal. Pupils are equal, round, and reactive to light.   Neck: Normal range of motion. Neck supple.   Cardiovascular: Normal rate, regular rhythm, normal heart sounds and intact distal pulses.   Pressure dressing on right radial. No evidence of hematoma.    Pulmonary/Chest: Effort normal and breath sounds normal.   Abdominal: Soft. Bowel sounds are normal.   Musculoskeletal: Normal range of motion. He exhibits no edema.   Neurological: He is alert and oriented to person, place, and time.   Skin: Skin is warm and dry.   Psychiatric: He has a normal mood and affect. His behavior is normal. Judgment and thought content normal.   Vitals reviewed.        Results Review:   Lab Results (last 24 hours)     Procedure Component Value Units Date/Time             CBC & Differential [[id number]] Collected: 01/12/17 0110     Specimen: Blood Updated: 01/12/17 0125     Narrative:       The following orders were created for panel order CBC & Differential.  Procedure Abnormality Status   --------- ----------- ------   CBC Auto Differential[[id number]] Abnormal Final result      Please " view results for these tests on the individual orders.     CBC Auto Differential [[id number]] (Abnormal) Collected: 01/12/17 0110     Specimen: Blood Updated: 01/12/17 0125       WBC 8.05 10*3/mm3         RBC 4.40 (L) 10*6/mm3         Hemoglobin 15.7 g/dL         Hematocrit 43.6 %         MCV 99.1 (H) fL         MCH 35.7 (H) pg         MCHC 36.0 g/dL         RDW 11.5 %         RDW-SD 41.8 fl         MPV 10.3 fL         Platelets 200 10*3/mm3         Neutrophil % 41.0 (L) %         Lymphocyte % 46.3 (H) %         Monocyte % 9.2 %         Eosinophil % 3.1 %         Basophil % 0.4 %         Immature Grans % 0.0 %         Neutrophils, Absolute 3.30 10*3/mm3         Lymphocytes, Absolute 3.73 10*3/mm3         Monocytes, Absolute 0.74 10*3/mm3         Eosinophils, Absolute 0.25 10*3/mm3         Basophils, Absolute 0.03 10*3/mm3         Immature Grans, Absolute 0.00 10*3/mm3       Comprehensive Metabolic Panel [[id number]] (Abnormal) Collected: 01/12/17 0110     Specimen: Blood Updated: 01/12/17 0143       Glucose 234 (H) mg/dL         BUN 16 mg/dL         Creatinine 1.08 mg/dL         Sodium 140 mmol/L         Potassium 4.0 mmol/L         Chloride 100 mmol/L         CO2 26.3 mmol/L         Calcium 9.6 mg/dL         Total Protein 7.1 g/dL         Albumin 4.30 g/dL         ALT (SGPT) 48 (H) U/L         AST (SGOT) 31 U/L         Alkaline Phosphatase 77 U/L         Total Bilirubin 0.3 mg/dL         eGFR Non African Amer 73 mL/min/1.73         Globulin 2.8 gm/dL         A/G Ratio 1.5 g/dL         BUN/Creatinine Ratio 14.8         Anion Gap 13.7 mmol/L       Troponin [[id number]] (Normal) Collected: 01/12/17 0110     Specimen: Blood Updated: 01/12/17 0143       Troponin T <0.010 ng/mL       Narrative:       Troponin T Reference Ranges:  Less than 0.03 ng/mL: Negative for AMI  0.03 to 0.09 ng/mL: Indeterminant for AMI  Greater than 0.09 ng/mL: Positive for AMI     Troponin [[id number]] (Normal) Collected: 01/12/17 0659     Specimen:  Blood Updated: 01/12/17 0754       Troponin T <0.010 ng/mL       Narrative:       Troponin T Reference Ranges:  Less than 0.03 ng/mL: Negative for AMI  0.03 to 0.09 ng/mL: Indeterminant for AMI  Greater than 0.09 ng/mL: Positive for AMI         Cardiac Catheterization 1/12/17:     CORONARY ANGIOGRAPHY:   1. Left main 0% stenosis. The vessel bifurcates into a left anterior descending and left circumflex arteries.  2. Left circumflex artery: 0% proximal stenosis. Gives off a moderate-sized first obtuse marginal branch with a 90% proximal stenosis. There is a 50% mid stenosis followed by a prior stent appeared widely patent. The stent has 0% in-stent restenosis. However the distal edge of the stent there is a 90% stenosis.  3. Left anterior descending artery: 0% proximal stenosis. Gives off a large first diagonal branch with an ostial 70% stenosis. There is a moderate size second diagonal branch with an 80% stenosis. The mid LAD has an eccentric 70% stenosis that had a FFR of 0.79. At the mid to distal LAD had 0% stenosis.  4. Right coronary artery: 0% stenosis of the proximal to mid RCA. There is a long 95% distal RCA stenosis before the bifurcation of the PDA and STEVIE branches. A moderate RV branch has a 70% ostial stenosis. Vessel bifurcates into a moderate-sized PDA and a small STEVIE branch. This appears to be a codominant system.       LEFT VENTRICULAR HEMODYNAMICS:   1. Left ventricular pressure: 103/22  2. Aortic pressure: 112/60      LEFT VENTRICULOGRAPHY:   Normal left ventricular systolic function and wall motion with an ejection fraction estimated at 55%.           Assessment & Plan  1. Severe 3 vessel CAD with preserved LV systolic function & prior PCI--EF 55%  2. HTN--on ACEI  3. HLD--on statin  4. DM--on oral regimen      Dr. Farrell reviewed cath films and feels that patient is an appropriate candidate for CABG. Patient currently not sure that he wants surgery, but is willing to hear recommendations from  Dr. Farrell before making final decision. Dr. Farrell to discuss options with patient and his family.         CARLYLE Blakely  01/12/17  11:57 AM

## 2017-01-13 NOTE — PLAN OF CARE
Problem: Anxiety (Adult)  Goal: Identify Related Risk Factors and Signs and Symptoms  Outcome: Ongoing (interventions implemented as appropriate)

## 2017-01-13 NOTE — ANESTHESIA POSTPROCEDURE EVALUATION
Patient: Oswald Savage    Procedure Summary     Date Anesthesia Start Anesthesia Stop Room / Location    01/13/17 0650 1151  [facility] / [facility]       Procedure Diagnosis Surgeon Provider    INTRAOPERATIVE PATY, MIDLINE STERNOTOMY, CORONARY ARTERY BYPASS GRAFTING X 5 UTILIZING LEFT INTERNAL MAMMARY ARTERY AND LEFT ENDOSCOPICALLY HARVESTED GREATER SAPHENOUS VEIN (N/A Chest) Chest pain at rest  (Chest pain at rest [R07.9]) MD Kwame Steve MD          Anesthesia Type: general  Last vitals  BP      Temp      Pulse     Resp      SpO2        Post Anesthesia Care and Evaluation    Patient location during evaluation: PACU  Patient participation: complete - patient cannot participate  Level of consciousness: obtunded/minimal responses  Pain management: adequate  Airway patency: patent  Anesthetic complications: No anesthetic complications  PONV Status: none  Cardiovascular status: acceptable  Respiratory status: acceptable, ETT, intubated and ventilator  Hydration status: acceptable

## 2017-01-13 NOTE — NURSING NOTE
"Met with wife & pt's sister while pt in surgery.  Provided & reviewed Cardiac Surgery D/C Reminders sheet, blue Home Activity Program for Cardiac Surgery Pts pamphlet, [facility] Cardiac Rehab Programs handout, and [facility] Cardiac Rehab flyer.  Pt lives in [place] & [facility] [employer], so not sure which cardiac rehab location will be best for him.  Wife prefers that he attend the hospital or G office location, so she can \"keep an eye on him\" and make sure he does his cardiac rehab.  I talked to her about how he will have to be engaged in this process, but I understand her thought process!  I did provide my name & phone number if they have any questions later about cardiac rehab or the discharge information that I provided.    "

## 2017-01-13 NOTE — ANESTHESIA PROCEDURE NOTES
Arterial Line    Patient location during procedure: OR  Start time: 1/13/2017 7:01 AM  Stop Time:1/13/2017 7:03 AM     Line placed for hemodynamic monitoring.  Performed By   Anesthesiologist: LORE CARRASQUILLO  Preanesthetic Checklist  Completed: patient identified, site marked, surgical consent, pre-op evaluation, timeout performed, IV checked, risks and benefits discussed and monitors and equipment checked  Arterial Line Prep   Sterile Tech: cap, gloves and mask  Prep: ChloraPrep  Patient monitoring: blood pressure monitoring, continuous pulse oximetry and EKG  Arterial Line Procedure   Laterality:left  Location:  radial artery  Catheter size: 20 G   Guidance: palpation technique  Number of attempts: 1  Successful placement: yes          Post Assessment   Dressing Type: biopatch applied, occlusive dressing applied, secured with tape and wrist guard applied.   Complications no  Circ/Move/Sens Assessment: normal and unchanged.   Patient Tolerance: patient tolerated the procedure well with no apparent complications

## 2017-01-14 ENCOUNTER — APPOINTMENT (OUTPATIENT)
Dept: GENERAL RADIOLOGY | Facility: HOSPITAL | Age: 47
End: 2017-01-14

## 2017-01-14 LAB
ALBUMIN SERPL-MCNC: 5.1 G/DL (ref 3.5–5.2)
ANION GAP SERPL CALCULATED.3IONS-SCNC: 15.7 MMOL/L
BASOPHILS # BLD AUTO: 0.01 10*3/MM3 (ref 0–0.2)
BASOPHILS NFR BLD AUTO: 0.1 % (ref 0–1.5)
BUN BLD-MCNC: 12 MG/DL (ref 6–20)
BUN/CREAT SERPL: 11.8 (ref 7–25)
CALCIUM SPEC-SCNC: 9.1 MG/DL (ref 8.6–10.5)
CHLORIDE SERPL-SCNC: 103 MMOL/L (ref 98–107)
CO2 SERPL-SCNC: 26.3 MMOL/L (ref 22–29)
CREAT BLD-MCNC: 1.02 MG/DL (ref 0.76–1.27)
DEPRECATED RDW RBC AUTO: 44.2 FL (ref 37–54)
EOSINOPHIL # BLD AUTO: 0.01 10*3/MM3 (ref 0–0.7)
EOSINOPHIL NFR BLD AUTO: 0.1 % (ref 0.3–6.2)
ERYTHROCYTE [DISTWIDTH] IN BLOOD BY AUTOMATED COUNT: 11.9 % (ref 11.5–14.5)
GFR SERPL CREATININE-BSD FRML MDRD: 78 ML/MIN/1.73
GLUCOSE BLD-MCNC: 132 MG/DL (ref 65–99)
GLUCOSE BLDC GLUCOMTR-MCNC: 109 MG/DL (ref 70–130)
GLUCOSE BLDC GLUCOMTR-MCNC: 120 MG/DL (ref 70–130)
GLUCOSE BLDC GLUCOMTR-MCNC: 122 MG/DL (ref 70–130)
GLUCOSE BLDC GLUCOMTR-MCNC: 124 MG/DL (ref 70–130)
GLUCOSE BLDC GLUCOMTR-MCNC: 128 MG/DL (ref 70–130)
GLUCOSE BLDC GLUCOMTR-MCNC: 139 MG/DL (ref 70–130)
GLUCOSE BLDC GLUCOMTR-MCNC: 163 MG/DL (ref 70–130)
GLUCOSE BLDC GLUCOMTR-MCNC: 169 MG/DL (ref 70–130)
GLUCOSE BLDC GLUCOMTR-MCNC: 192 MG/DL (ref 70–130)
HCT VFR BLD AUTO: 40.1 % (ref 40.4–52.2)
HGB BLD-MCNC: 13.4 G/DL (ref 13.7–17.6)
IMM GRANULOCYTES # BLD: 0.05 10*3/MM3 (ref 0–0.03)
IMM GRANULOCYTES NFR BLD: 0.5 % (ref 0–0.5)
INR PPP: 1.26 (ref 0.9–1.1)
LYMPHOCYTES # BLD AUTO: 1.34 10*3/MM3 (ref 0.9–4.8)
LYMPHOCYTES NFR BLD AUTO: 12.2 % (ref 19.6–45.3)
MAGNESIUM SERPL-MCNC: 2.4 MG/DL (ref 1.6–2.6)
MCH RBC QN AUTO: 34.1 PG (ref 27–32.7)
MCHC RBC AUTO-ENTMCNC: 33.4 G/DL (ref 32.6–36.4)
MCV RBC AUTO: 102 FL (ref 79.8–96.2)
MONOCYTES # BLD AUTO: 1.1 10*3/MM3 (ref 0.2–1.2)
MONOCYTES NFR BLD AUTO: 10 % (ref 5–12)
NEUTROPHILS # BLD AUTO: 8.51 10*3/MM3 (ref 1.9–8.1)
NEUTROPHILS NFR BLD AUTO: 77.1 % (ref 42.7–76)
PHOSPHATE SERPL-MCNC: 5.5 MG/DL (ref 2.5–4.5)
PLATELET # BLD AUTO: 128 10*3/MM3 (ref 140–500)
PMV BLD AUTO: 10.3 FL (ref 6–12)
POTASSIUM BLD-SCNC: 4.3 MMOL/L (ref 3.5–5.2)
PROTHROMBIN TIME: 15.3 SECONDS (ref 11.7–14.2)
RBC # BLD AUTO: 3.93 10*6/MM3 (ref 4.6–6)
SODIUM BLD-SCNC: 145 MMOL/L (ref 136–145)
WBC NRBC COR # BLD: 11.02 10*3/MM3 (ref 4.5–10.7)

## 2017-01-14 PROCEDURE — 25010000002 ENOXAPARIN PER 10 MG: Performed by: NURSE PRACTITIONER

## 2017-01-14 PROCEDURE — 85025 COMPLETE CBC W/AUTO DIFF WBC: CPT | Performed by: NURSE PRACTITIONER

## 2017-01-14 PROCEDURE — 71010 HC CHEST PA OR AP: CPT

## 2017-01-14 PROCEDURE — 25010000002 MORPHINE PER 10 MG: Performed by: NURSE PRACTITIONER

## 2017-01-14 PROCEDURE — 99233 SBSQ HOSP IP/OBS HIGH 50: CPT | Performed by: INTERNAL MEDICINE

## 2017-01-14 PROCEDURE — 82962 GLUCOSE BLOOD TEST: CPT

## 2017-01-14 PROCEDURE — 99255 IP/OBS CONSLTJ NEW/EST HI 80: CPT | Performed by: INTERNAL MEDICINE

## 2017-01-14 PROCEDURE — 80069 RENAL FUNCTION PANEL: CPT | Performed by: NURSE PRACTITIONER

## 2017-01-14 PROCEDURE — 25010000002 FUROSEMIDE PER 20 MG: Performed by: NURSE PRACTITIONER

## 2017-01-14 PROCEDURE — 63710000001 INSULIN ASPART PER 5 UNITS: Performed by: INTERNAL MEDICINE

## 2017-01-14 PROCEDURE — 83735 ASSAY OF MAGNESIUM: CPT | Performed by: NURSE PRACTITIONER

## 2017-01-14 PROCEDURE — 25010000002 METOCLOPRAMIDE PER 10 MG: Performed by: NURSE PRACTITIONER

## 2017-01-14 PROCEDURE — 85610 PROTHROMBIN TIME: CPT | Performed by: NURSE PRACTITIONER

## 2017-01-14 PROCEDURE — 97162 PT EVAL MOD COMPLEX 30 MIN: CPT

## 2017-01-14 PROCEDURE — 93010 ELECTROCARDIOGRAM REPORT: CPT | Performed by: INTERNAL MEDICINE

## 2017-01-14 PROCEDURE — 94799 UNLISTED PULMONARY SVC/PX: CPT

## 2017-01-14 PROCEDURE — 93005 ELECTROCARDIOGRAM TRACING: CPT | Performed by: NURSE PRACTITIONER

## 2017-01-14 PROCEDURE — 97110 THERAPEUTIC EXERCISES: CPT

## 2017-01-14 PROCEDURE — 25010000003 CEFAZOLIN IN DEXTROSE 2-4 GM/100ML-% SOLUTION: Performed by: NURSE PRACTITIONER

## 2017-01-14 RX ORDER — POTASSIUM CHLORIDE 750 MG/1
40 CAPSULE, EXTENDED RELEASE ORAL AS NEEDED
Status: DISCONTINUED | OUTPATIENT
Start: 2017-01-14 | End: 2017-01-18 | Stop reason: HOSPADM

## 2017-01-14 RX ORDER — POTASSIUM CHLORIDE 7.45 MG/ML
10 INJECTION INTRAVENOUS
Status: DISCONTINUED | OUTPATIENT
Start: 2017-01-14 | End: 2017-01-18 | Stop reason: HOSPADM

## 2017-01-14 RX ORDER — DEXTROSE MONOHYDRATE 25 G/50ML
25 INJECTION, SOLUTION INTRAVENOUS
Status: DISCONTINUED | OUTPATIENT
Start: 2017-01-14 | End: 2017-01-18 | Stop reason: HOSPADM

## 2017-01-14 RX ORDER — GUAIFENESIN 600 MG/1
1200 TABLET, EXTENDED RELEASE ORAL 2 TIMES DAILY
Status: DISCONTINUED | OUTPATIENT
Start: 2017-01-14 | End: 2017-01-18 | Stop reason: HOSPADM

## 2017-01-14 RX ORDER — NICOTINE POLACRILEX 4 MG
15 LOZENGE BUCCAL
Status: DISCONTINUED | OUTPATIENT
Start: 2017-01-14 | End: 2017-01-18 | Stop reason: HOSPADM

## 2017-01-14 RX ORDER — POTASSIUM CHLORIDE 1.5 G/1.77G
40 POWDER, FOR SOLUTION ORAL AS NEEDED
Status: DISCONTINUED | OUTPATIENT
Start: 2017-01-14 | End: 2017-01-18 | Stop reason: HOSPADM

## 2017-01-14 RX ADMIN — CYCLOBENZAPRINE HYDROCHLORIDE 10 MG: 10 TABLET, FILM COATED ORAL at 01:49

## 2017-01-14 RX ADMIN — DOCUSATE SODIUM -SENNOSIDES 2 TABLET: 50; 8.6 TABLET, COATED ORAL at 20:46

## 2017-01-14 RX ADMIN — GUAIFENESIN 1200 MG: 600 TABLET, EXTENDED RELEASE ORAL at 09:03

## 2017-01-14 RX ADMIN — CEFAZOLIN SODIUM 2 G: 2 INJECTION, SOLUTION INTRAVENOUS at 12:06

## 2017-01-14 RX ADMIN — CEFAZOLIN SODIUM 2 G: 2 INJECTION, SOLUTION INTRAVENOUS at 18:21

## 2017-01-14 RX ADMIN — CEFAZOLIN SODIUM 2 G: 2 INJECTION, SOLUTION INTRAVENOUS at 03:19

## 2017-01-14 RX ADMIN — ASPIRIN 81 MG: 81 TABLET ORAL at 08:11

## 2017-01-14 RX ADMIN — OXYCODONE HYDROCHLORIDE 10 MG: 5 TABLET ORAL at 22:20

## 2017-01-14 RX ADMIN — MORPHINE SULFATE 4 MG: 4 INJECTION, SOLUTION INTRAMUSCULAR; INTRAVENOUS at 19:32

## 2017-01-14 RX ADMIN — ENOXAPARIN SODIUM 40 MG: 40 INJECTION SUBCUTANEOUS at 18:20

## 2017-01-14 RX ADMIN — INSULIN ASPART 2 UNITS: 100 INJECTION, SOLUTION INTRAVENOUS; SUBCUTANEOUS at 12:14

## 2017-01-14 RX ADMIN — ACETAMINOPHEN 650 MG: 325 TABLET ORAL at 18:20

## 2017-01-14 RX ADMIN — OXYCODONE HYDROCHLORIDE 10 MG: 5 TABLET ORAL at 04:32

## 2017-01-14 RX ADMIN — HYDROCODONE BITARTRATE AND ACETAMINOPHEN 2 TABLET: 5; 325 TABLET ORAL at 13:50

## 2017-01-14 RX ADMIN — ACETAMINOPHEN 650 MG: 325 TABLET ORAL at 01:49

## 2017-01-14 RX ADMIN — ACETAMINOPHEN 650 MG: 325 TABLET ORAL at 22:20

## 2017-01-14 RX ADMIN — METOPROLOL TARTRATE 12.5 MG: 25 TABLET ORAL at 08:11

## 2017-01-14 RX ADMIN — OXYCODONE HYDROCHLORIDE 10 MG: 5 TABLET ORAL at 18:20

## 2017-01-14 RX ADMIN — METOPROLOL TARTRATE 12.5 MG: 25 TABLET ORAL at 20:45

## 2017-01-14 RX ADMIN — METOPROLOL TARTRATE 12.5 MG: 25 TABLET ORAL at 03:36

## 2017-01-14 RX ADMIN — ATORVASTATIN CALCIUM 40 MG: 40 TABLET, FILM COATED ORAL at 20:46

## 2017-01-14 RX ADMIN — PANTOPRAZOLE SODIUM 40 MG: 40 TABLET, DELAYED RELEASE ORAL at 06:53

## 2017-01-14 RX ADMIN — INSULIN ASPART 2 UNITS: 100 INJECTION, SOLUTION INTRAVENOUS; SUBCUTANEOUS at 22:19

## 2017-01-14 RX ADMIN — CYCLOBENZAPRINE HYDROCHLORIDE 10 MG: 10 TABLET, FILM COATED ORAL at 16:37

## 2017-01-14 RX ADMIN — MUPIROCIN 1 APPLICATION: 20 OINTMENT TOPICAL at 08:11

## 2017-01-14 RX ADMIN — GUAIFENESIN 1200 MG: 600 TABLET, EXTENDED RELEASE ORAL at 18:20

## 2017-01-14 RX ADMIN — METOCLOPRAMIDE 10 MG: 5 INJECTION, SOLUTION INTRAMUSCULAR; INTRAVENOUS at 06:53

## 2017-01-14 RX ADMIN — MORPHINE SULFATE 2 MG: 4 INJECTION, SOLUTION INTRAMUSCULAR; INTRAVENOUS at 02:01

## 2017-01-14 RX ADMIN — FUROSEMIDE 20 MG: 10 INJECTION, SOLUTION INTRAMUSCULAR; INTRAVENOUS at 01:15

## 2017-01-14 RX ADMIN — ACETAMINOPHEN 650 MG: 325 TABLET ORAL at 09:03

## 2017-01-14 NOTE — PROGRESS NOTES
Hospital Follow Up    LOS:  LOS: 1 day   Patient Name: Oswald Savage  Age/Sex: 46 y.o. male  : 1970  MRN: [id number]    Date of Hospital Visit: 17  Length of Stay: 1  Encounter Provider: Aquilino Shultz MD  Place of Service: [facility] CARDIOLOGY    Subjective:     Chief Complaint: Follow-up CABG    Interval History: The patient is now status post CABG.  He remains in sinus bradycardia off all intravenous medications.      Objective:     Objective:  Temp:  [99.5 °F (37.5 °C)-100.4 °F (38 °C)] 99.5 °F (37.5 °C)  Heart Rate:  [61-89] 69  Resp:  [12-21] 16  BP: ()/(60-78) 117/73  Arterial Line BP: ()/(40-75) 132/56  FiO2 (%):  [40 %-100 %] 40 %  Body mass index is 30.87 kg/(m^2).    Intake/Output Summary (Last 24 hours) at 17 0943  Last data filed at 17 0900   Gross per 24 hour   Intake   4939 ml   Output   5420 ml   Net   -481 ml     Last 3 weights    17  1332 17  1344 17  1930   Weight: 234 lb (106 kg) 234 lb (106 kg) 234 lb (106 kg)     Weight change: -6.4 oz (-0.181 kg)    Physical Exam:   General Appearance: Alert, cooperative, in no acute distress. AAOx4.   HEENT: Normocephalic.  Neck: Supple. No JVD. No Carotid bruit. No thyromegaly  Lungs: CTAB. Normal respiratory effort and rate.  Heart:: Regular rate and rhythm, normal S1 and S2, no murmurs, gallops or rubs.  Abdomen: Soft, nontender, non-distended. positive bowel sounds  Extremities: Warm, no cyanosis, or clubbing. No edema.     Lab Review:     Results from last 7 days  Lab Units 17  0329 17  1944 17  1529  17  1953 17  0110   SODIUM mmol/L 145  --  145  < > 142 140   POTASSIUM mmol/L 4.3 4.8 4.3  < > 4.1 4.0   CHLORIDE mmol/L 103  --  107  < > 101 100   TOTAL CO2 mmol/L 26.3  --  23.6  < > 26.1 26.3   BUN mg/dL 12  --  11  < > 10 16   CREATININE mg/dL 1.02  --  1.07  < > 0.86 1.08   GLUCOSE mg/dL 132*  --  102*  < > 167* 234*    CALCIUM mg/dL 9.1  --  9.1  < > 9.5 9.6   AST (SGOT) U/L  --   --   --   --  32 31   ALT (SGPT) U/L  --   --   --   --  51* 48*   < > = values in this interval not displayed.    Results from last 7 days  Lab Units 01/12/17  0659 01/12/17  0110   TROPONIN T ng/mL <0.010 <0.010       Results from last 7 days  Lab Units 01/14/17  0329 01/13/17  1529   WBC 10*3/mm3 11.02* 10.80*   HEMOGLOBIN g/dL 13.4* 14.3   HEMATOCRIT % 40.1* 39.8*   PLATELETS 10*3/mm3 128* 117*       Results from last 7 days  Lab Units 01/14/17  0329 01/13/17  1201   INR  1.26* 1.25*   APTT seconds  --  25.7       Results from last 7 days  Lab Units 01/14/17  0329 01/13/17  1529   MAGNESIUM mg/dL 2.4 2.4       Results from last 7 days  Lab Units 01/12/17  1953   CHOLESTEROL mg/dL 144   TRIGLYCERIDES mg/dL 165*   HDL CHOL mg/dL 29*                 I reviewed the patient's new clinical results.          I personally viewed and interpreted the patient's EKG/Telemetry data.  Current Medications:   Scheduled Meds:  aspirin 81 mg Oral Daily   atorvastatin 40 mg Oral Nightly   ceFAZolin 2 g Intravenous Q8H   enoxaparin 40 mg Subcutaneous Q24H   guaiFENesin 1,200 mg Oral BID   insulin aspart 2-5 Units Subcutaneous 4x Daily AC & at Bedtime   magnesium sulfate in D5W 1g/100mL (PREMIX) 1 g Intravenous Q8H   metoprolol tartrate 12.5 mg Oral Q12H   mupirocin 1 application Each Nare Daily   pantoprazole 40 mg Oral Q AM   sennosides-docusate sodium 2 tablet Oral Nightly     Continuous Infusions:  niCARdipine 5-15 mg/hr Last Rate: Stopped (01/14/17 0755)       Allergies:  No Known Allergies    Assessment & Plan   1.  Ischemic heart disease: Status post previous inferior myocardial infarction, severe three-vessel coronary disease, status post CABG  2.  Hypertension: Controlled  3.  Diabetes mellitus  4.  Hyperlipidemia: On medical therapy        Plan: Continue supportive postoperative care.      Aquilino Shultz MD  01/14/17

## 2017-01-14 NOTE — PROGRESS NOTES
Acute Care - Physical Therapy Treatment Note  [facility]     Patient Name: Oswald Savage  : 1970  MRN: [id number]  Today's Date: 2017  Onset of Illness/Injury or Date of Surgery Date: 17          Admit Date: 2017    Visit Dx:    ICD-10-CM ICD-9-CM   1. Chest pain at rest R07.9 786.50   2. Angina at rest I20.8 413.9   3. Generalized weakness R53.1 780.79     Patient Active Problem List   Diagnosis   • Chest pain at rest                   IP PT Goals       17          Cardiopulmonary PT LTG    Cardiopulmonary PT LTG, Time to Achieve 1 wk  -CH      Cardiopulmonary PT LTG, Level Level V  -CH        User Key  (r) = Recorded By, (t) = Taken By, (c) = Cosigned By    Initials Name Provider Type     Albina Bains, PT Physical Therapist          Physical Therapy Education     Title: PT OT SLP Therapies (Active)     Topic: Physical Therapy (Active)     Point: Mobility training (Done)    Learning Progress Summary    Learner Readiness Method Response Comment Documented by Status   Patient Acceptance E,D,TB VU,NR   17 09 Done               Point: Body mechanics (Done)    Learning Progress Summary    Learner Readiness Method Response Comment Documented by Status   Patient Acceptance E,D,TB VU,NR   17 Done               Point: Precautions (Done)    Learning Progress Summary    Learner Readiness Method Response Comment Documented by Status   Patient Acceptance E,D,TB VU,NR   17 Done                      User Key     Initials Effective Dates Name Provider Type [facility] 12/01/15 -  Albina Bains, PT Physical Therapist PT                    PT Recommendation and Plan  Anticipated Discharge Disposition: home with home health  Planned Therapy Interventions: balance training, bed mobility training, gait training, home exercise program, patient/family education, ROM (Range of Motion), stair training, transfer training  PT Frequency: daily  Plan  of Care Review  Plan Of Care Reviewed With: patient  Progress: improving  Outcome Summary/Follow up Plan: Pt presents with impaired functional mobility and gait secondary to generalized weakness and decreased activity tolerance post-op CABG. Pt may benefit from skilled PT to address these deficits.          Outcome Measures       01/14/17 0900          How much help from another person do you currently need...    Turning from your back to your side while in flat bed without using bedrails? 3  -CH      Moving from lying on back to sitting on the side of a flat bed without bedrails? 3  -CH      Moving to and from a bed to a chair (including a wheelchair)? 3  -CH      Standing up from a chair using your arms (e.g., wheelchair, bedside chair)? 3  -CH      Climbing 3-5 steps with a railing? 2  -CH      To walk in hospital room? 3  -CH      AM-PAC 6 Clicks Score 17  -      Functional Assessment    Outcome Measure Options AM-PAC 6 Clicks Basic Mobility (PT)  -        User Key  (r) = Recorded By, (t) = Taken By, (c) = Cosigned By    Initials Name Provider Type     Albina Bains PT Physical Therapist           Time Calculation:         PT Charges       01/14/17 0914          Time Calculation    Start Time 0845  -      Stop Time 0900  -      Time Calculation (min) 15 min  -      PT Received On 01/14/17  -      PT - Next Appointment 01/15/17  -      PT Goal Re-Cert Due Date 01/21/17  -        User Key  (r) = Recorded By, (t) = Taken By, (c) = Cosigned By    Initials Name Provider Type     Albina Bains PT Physical Therapist          Therapy Charges for Today     Code Description Service Date Service Provider Modifiers Qty    45933467263 HC PT EVAL MOD COMPLEXITY 2 1/14/2017 Albina Bains, PT GP 1    36331297834 HC PT THER PROC EA 15 MIN 1/14/2017 Albina Bains, PT GP 1    60512854526 HC PT THER SUPP EA 15 MIN 1/14/2017 Albina Bains PT GP 1          PT G-Codes  Outcome Measure  Options: -PAC 6 Clicks Basic Mobility (PT)    Albina Bains, PT  1/14/2017

## 2017-01-14 NOTE — PLAN OF CARE
Problem: Patient Care Overview (Adult)  Goal: Plan of Care Review  Outcome: Ongoing (interventions implemented as appropriate)    01/14/17 0040   Coping/Psychosocial Response Interventions   Plan Of Care Reviewed With patient   Patient Care Overview   Progress improving   Outcome Evaluation   Outcome Summary/Follow up Plan Satisfactory improvement post-op night 0. Off levophed. NSR. 2L n/c. Continue to monitor.        Goal: Adult Individualization and Mutuality  Outcome: Ongoing (interventions implemented as appropriate)    01/14/17 0040   Individualization   Patient Specific Goals SBP . PAIN CONTROL.    Patient Specific Interventions Meds and treatments as ordered.   Mutuality/Individual Preferences   What Anxieties, Fears or Concerns Do You Have About Your Health or Care? Very concerned about pain control.         Problem: Cardiac Surgery (Adult)  Goal: Signs and Symptoms of Listed Potential Problems Will be Absent or Manageable (Cardiac Surgery)  Outcome: Ongoing (interventions implemented as appropriate)    01/14/17 0040   Cardiac Surgery   Problems Assessed (Cardiac Surgery) all   Problems Present (Cardiac Surgery) pain;dysrhythmia/arrhythmia;electrolyte imbalance;fluid imbalance;hemodynamic instability;hypoxia/hypoxemia;situational response         Problem: Fall Risk (Adult)  Goal: Identify Related Risk Factors and Signs and Symptoms  Outcome: Outcome(s) achieved Date Met:  01/14/17 01/14/17 0040   Fall Risk   Fall Risk: Related Risk Factors culprit medication(s);polypharmacy;fear of falling;sleep pattern alteration;slipper/uneven surfaces;environment unfamiliar   Fall Risk: Signs and Symptoms presence of risk factors       Goal: Absence of Falls  Outcome: Ongoing (interventions implemented as appropriate)    01/14/17 0040   Fall Risk (Adult)   Absence of Falls making progress toward outcome         Problem: Skin Integrity Impairment, Risk/Actual (Adult)  Goal: Identify Related Risk Factors and Signs  and Symptoms  Outcome: Outcome(s) achieved Date Met:  01/14/17 01/14/17 0040   Skin Integrity Impairment, Risk/Actual   Skin Integrity Impairment, Risk/Actual: Related Risk Factors medication effects;surgery/procedure;tissue perfusion impaired;treatment effects   Signs and Symptoms (Skin Integrity Impairment) edema       Goal: Skin Integrity/Wound Healing  Outcome: Ongoing (interventions implemented as appropriate)    01/14/17 0040   Skin Integrity Impairment, Risk/Actual (Adult)   Skin Integrity/Wound Healing making progress toward outcome

## 2017-01-14 NOTE — PLAN OF CARE
Problem: Cardiac Surgery (Adult)  Goal: Signs and Symptoms of Listed Potential Problems Will be Absent or Manageable (Cardiac Surgery)  Outcome: Ongoing (interventions implemented as appropriate)

## 2017-01-14 NOTE — PROGRESS NOTES
Acute Care - Physical Therapy Initial Evaluation  [facility]     Patient Name: Oswald Savage  : 1970  MRN: [id number]  Today's Date: 2017   Onset of Illness/Injury or Date of Surgery Date: 17            Admit Date: 2017     Visit Dx:    ICD-10-CM ICD-9-CM   1. Chest pain at rest R07.9 786.50   2. Angina at rest I20.8 413.9   3. Generalized weakness R53.1 780.79     Patient Active Problem List   Diagnosis   • Chest pain at rest     Past Medical History   Diagnosis Date   • CAD (coronary artery disease)    • Diabetes mellitus    • GERD (gastroesophageal reflux disease)    • Heart attack      STENTS X 2 PLACED IN COUNTRY OF [place]   • HLD (hyperlipidemia)    • Hypertension    • Smoker      Past Surgical History   Procedure Laterality Date   • Coronary angioplasty with stent placement     • Cardiac catheterization N/A 2017     Procedure: Left ventriculography;  Surgeon: Diandra Suarez MD;  Location: [facility] CATH INVASIVE LOCATION;  Service:    • Cardiac catheterization N/A 2017     Procedure: Left Heart Cath;  Surgeon: Diandra Suarez MD;  Location: [facility] CATH INVASIVE LOCATION;  Service:    • Cardiac catheterization N/A 2017     Procedure: Coronary angiography;  Surgeon: Diandra Suarez MD;  Location: [facility] CATH INVASIVE LOCATION;  Service:    • Cardiac catheterization  2017     Procedure: Functional Flow Smock;  Surgeon: Diandra Suarez MD;  Location: [facility] CATH INVASIVE LOCATION;  Service:    • Coronary artery bypass graft  01/13/2017     X7          PT ASSESSMENT (last 72 hours)      PT Evaluation       17 0900 17 0505    Rehab Evaluation    Document Type evaluation  -     Subjective Information agree to therapy;complains of;fatigue  -CH     Patient Effort, Rehab Treatment good  -CH     Symptoms Noted During/After Treatment none  -CH     General Information    Onset of Illness/Injury or Date of Surgery Date 17  -CH     General  Observations sitting in chair, very groggy, pt having some difficulty staying awake initially  -     Pertinent History Of Current Problem pt is post-op CABG, pt with h/o MI, stents and DM  -     Precautions/Limitations fall precautions;cardiac precautions;sternal precautions  -     Prior Level of Function independent:;gait;transfer;bed mobility;ADL's  -     Equipment Currently Used at Home none  -CH     Plans/Goals Discussed With patient  -     Barriers to Rehab none identified  -CH     Living Environment    Lives With  alone  -LB    Living Arrangements  house  -LB    Home Accessibility  no concerns  -LB    Stair Railings at Home  none  -LB    Type of Financial/Environmental Concern  none  -LB    Transportation Available  family or friend will provide  -LB    Clinical Impression    Patient/Family Goals Statement to return to [facility]  -     Criteria for Skilled Therapeutic Interventions Met treatment indicated  -     Impairments Found (describe specific impairments) gait, locomotion, and balance;muscle performance  -     Rehab Potential good, to achieve stated therapy goals  -     Vital Signs    Pretreatment Heart Rate (beats/min) 56  -CH     Posttreatment Heart Rate (beats/min) 63  -CH     Post SpO2 (%) 96  -CH     O2 Delivery Post Treatment supplemental O2  -     Pain Assessment    Pain Assessment No/denies pain  -     Cognitive Assessment/Intervention    Current Cognitive/Communication Assessment functional   pt very groggy  -     Orientation Status oriented x 4  -CH     Follows Commands/Answers Questions 100% of the time  -     Personal Safety WNL/WFL  -     Personal Safety Interventions fall prevention program maintained;gait belt;nonskid shoes/slippers when out of bed  -     ROM (Range of Motion)    General ROM upper extremity range of motion deficits identified   UE ROM limited post-op due to restriction  -     MMT (Manual Muscle Testing)    General MMT Assessment other (see  comments)   generalized post-op weakness noted  -     Bed Mobility, Assessment/Treatment    Bed Mob, Supine to Sit, [place] not tested  -     Bed Mob, Sit to Supine, [place] not tested  -     Bed Mobility, Comment pt sitting in chair  -     Transfer Assessment/Treatment    Transfers, Sit-Stand [place] verbal cues required;nonverbal cues required (demo/gesture);minimum assist (75% patient effort);2 person assist required;hand held assist  -     Transfers, Stand-Sit [place] nonverbal cues required (demo/gesture);verbal cues required;minimum assist (75% patient effort);2 person assist required;hand held assist  -     Gait Assessment/Treatment    Gait, [place] Level verbal cues required;nonverbal cues required (demo/gesture);minimum assist (75% patient effort);2 person assist required;hand held assist  -     Gait, Distance (Feet) 80  -     Gait, Gait Deviations brandy decreased;stride length decreased;step length decreased   repeated cues to take bigger steps and to open his eyes  -     Gait, Comment pt with chest tube and external pacemaker  -     Motor Skills/Interventions    Additional Documentation Balance Skills Training (Group)  -     Balance Skills Training    Standing-Level of Assistance Minimum assistance;x2  -     Gait Balance-Level of Assistance Minimum assistance;x2  -     Therapy Exercises    Exercise Protocols --   cardiac level 3  -     Positioning and Restraints    Pre-Treatment Position sitting in chair/recliner  -     Post Treatment Position chair  -     In Chair sitting;call light within reach;encouraged to call for assist;with [facility]  -       01/12/17 0429 01/12/17 0426    General Information    Equipment Currently Used at Home  none  -LB    Living Environment    Lives With spouse  -LB     Living Arrangements apartment  -LB     Home Accessibility no concerns  -LB     Type of Financial/Environmental Concern none  -LB     Transportation Available  none  -LB       User Key  (r) = Recorded By, (t) = Taken By, (c) = Cosigned By    Initials Name Provider Type    SILVANA Bains, PT Physical Therapist    JAZMINE Hull RN Registered Nurse          Physical Therapy Education     Title: PT OT SLP Therapies (Active)     Topic: Physical Therapy (Active)     Point: Mobility training (Done)    Learning Progress Summary    Learner Readiness Method Response Comment Documented by Status   Patient Acceptance E,D,TB VU,NR   01/14/17 0912 Done               Point: Body mechanics (Done)    Learning Progress Summary    Learner Readiness Method Response Comment Documented by Status   Patient Acceptance E,D,TB VU,NR   01/14/17 0912 Done               Point: Precautions (Done)    Learning Progress Summary    Learner Readiness Method Response Comment Documented by Status   Patient Acceptance E,D,TB VU,NR   01/14/17 0912 Done                      User Key     Initials Effective Dates Name Provider Type [facility] 12/01/15 -  Albina Bains PT Physical Therapist PT                PT Recommendation and Plan  Anticipated Discharge Disposition: home with home health  Planned Therapy Interventions: balance training, bed mobility training, gait training, home exercise program, patient/family education, ROM (Range of Motion), stair training, transfer training  PT Frequency: daily  Plan of Care Review  Plan Of Care Reviewed With: patient  Progress: improving  Outcome Summary/Follow up Plan: Pt presents with impaired functional mobility and gait secondary to generalized weakness and decreased activity tolerance post-op CABG. Pt may benefit from skilled PT to address these deficits.          IP PT Goals       01/14/17 0913          Cardiopulmonary PT LTG    Cardiopulmonary PT LTG, Time to Achieve 1 wk  -CH      Cardiopulmonary PT LTG, Level Level V  -CH        User Key  (r) = Recorded By, (t) = Taken By, (c) = Cosigned By    Initials Name Provider Type    SILVANA CALVO  Nara, ESTER Physical Therapist                Outcome Measures       01/14/17 0900          How much help from another person do you currently need...    Turning from your back to your side while in flat bed without using bedrails? 3  -CH      Moving from lying on back to sitting on the side of a flat bed without bedrails? 3  -CH      Moving to and from a bed to a chair (including a wheelchair)? 3  -CH      Standing up from a chair using your arms (e.g., wheelchair, bedside chair)? 3  -CH      Climbing 3-5 steps with a railing? 2  -CH      To walk in hospital room? 3  -CH      AM-PAC 6 Clicks Score 17  -CH      Functional Assessment    Outcome Measure Options AM-PAC 6 Clicks Basic Mobility (PT)  -CH        User Key  (r) = Recorded By, (t) = Taken By, (c) = Cosigned By    Initials Name Provider Type    SILVANA Bains PT Physical Therapist           Time Calculation:         PT Charges       01/14/17 0914          Time Calculation    Start Time 0845  -      Stop Time 0900  -      Time Calculation (min) 15 min  -      PT Received On 01/14/17  -      PT - Next Appointment 01/15/17  -      PT Goal Re-Cert Due Date 01/21/17  -        User Key  (r) = Recorded By, (t) = Taken By, (c) = Cosigned By    Initials Name Provider Type    SILVANA Bains PT Physical Therapist          Therapy Charges for Today     Code Description Service Date Service Provider Modifiers Qty    67921450075 HC PT EVAL MOD COMPLEXITY 2 1/14/2017 Albina Bains, PT GP 1    49295489395 HC PT THER PROC EA 15 MIN 1/14/2017 Albina Bains PT GP 1    59167976297 HC PT THER SUPP EA 15 MIN 1/14/2017 Albina Bains, PT GP 1          PT G-Codes  Outcome Measure Options: AM-PAC 6 Clicks Basic Mobility (PT)      Albina Bains PT  1/14/2017

## 2017-01-14 NOTE — CONSULTS
46 y.o.  Patient Care Team:  Rose Marie Farr MD as PCP - General (Internal Medicine)      Chief Complaint   Patient presents with   • Chest Pain     MIDSTERNAL CHEST BURNING, NO RADIATION. PT REPORTS MILD SOA. FELT GENERALIZED WEAKNESS AT ONSET. ONSET APROX 2350    postoperative diabetes management    HPI  Patient is a 46-year-old male from [place] with a past medical history of uncontrolled type 2 diabetes mellitus, hypertension, hyperlipidemia, tobacco abuse, coronary artery disease with prior angioplasty in 2008 admitted to the hospital with chest pain and further evaluation revealed that he needs coronary artery bypass surgery.  Patient underwent surgery yesterday and was on insulin drip until this morning when it was discontinued.  Patient is currently extubated and is not on IV insulin  He is not eating yet is very groggy and has received pain medication.  Most of the history was obtained from chart review.  As per the nurse patient will advance to maybe clear liquids this evening    Patient is very groggy and could not provide much information  Home med list reviewed and he is on Janumet and Amaryl at home.  Compliance is  under question  It is also unclear if the patient has any diabetes related complications.  Will discuss more with him and when is more alert tomorrow     Past Medical History   Diagnosis Date   • CAD (coronary artery disease)    • Diabetes mellitus    • GERD (gastroesophageal reflux disease)    • Heart attack 2007     ST[place] 2 [place]AC[place]   • HLD (hyperlipidemia)    • Hypertension    • Smoker        History reviewed. No pertinent family history.    Social History     Social History   • Marital status:      Spouse name: N/A   • Number of children: N/A   • Years of education: N/A     Occupational History   • Not on file.     Social History Main Topics   • Smoking status: Current Every Day Smoker     Packs/day: 1.00     Types: Cigarettes   • Smokeless tobacco: Not  on file   • Alcohol use No   • Drug use: No   • Sexual activity: Not on file     Other Topics Concern   • Not on file     Social History Narrative       No Known Allergies      Current Facility-Administered Medications:   •  acetaminophen (TYLENOL) tablet 650 mg, 650 mg, Oral, Q4H PRN, Tita Jacksonft, APRN, 650 mg at 01/14/17 0903  •  ALPRAZolam (XANAX) tablet 0.25 mg, 0.25 mg, Oral, Q8H PRN, Tita Jacksonft, APRN, 0.25 mg at 01/13/17 2058  •  aspirin EC tablet 81 mg, 81 mg, Oral, Daily, Tita A Joslyn, APRN, 81 mg at 01/14/17 0811  •  atorvastatin (LIPITOR) tablet 40 mg, 40 mg, Oral, Nightly, Quincy Carolina MD, 40 mg at 01/13/17 2323  •  bisacodyl (DULCOLAX) EC tablet 10 mg, 10 mg, Oral, Daily PRN, Tita Jorgensen, APRN  •  bisacodyl (DULCOLAX) suppository 10 mg, 10 mg, Rectal, Daily PRN, Tita Jorgensen, APRN  •  ceFAZolin in dextrose (ANCEF) IVPB solution 2 g, 2 g, Intravenous, Q8H, Tita Jorgensen, APRN, Last Rate: 200 mL/hr at 01/14/17 0319, 2 g at 01/14/17 1206  •  cyclobenzaprine (FLEXERIL) tablet 10 mg, 10 mg, Oral, Q8H PRN, Tita Jorgensen, APRN, 10 mg at 01/14/17 1637  •  dextrose (D50W) solution 25 g, 25 g, Intravenous, Q15 Min PRN, Alexandre Farrell MD  •  dextrose (GLUTOSE) oral gel 15 g, 15 g, Oral, Q15 Min PRN, Alexandre Farrell MD  •  enoxaparin (LOVENOX) syringe 40 mg, 40 mg, Subcutaneous, Q24H, Tita Jorgensen, APRN  •  glucagon (human recombinant) (GLUCAGEN DIAGNOSTIC) injection 1 mg, 1 mg, Subcutaneous, Q15 Min PRN, Alexandre Farrell MD  •  guaiFENesin (MUCINEX) 12 hr tablet 1,200 mg, 1,200 mg, Oral, BID, Alexandre Farrell MD, 1,200 mg at 01/14/17 0903  •  HYDROcodone-acetaminophen (NORCO) 5-325 MG per tablet 2 tablet, 2 tablet, Oral, Q4H PRN, Tita Jorgensen, APRN, 2 tablet at 01/14/17 1350  •  Influenza Vac Subunit Quad (FLUCELVAX) injection 0.5 mL, 0.5 mL, Intramuscular, During Hospitalization, Kaiser Abdi MD  •  insulin aspart (novoLOG) injection 2-5 Units, 2-5 Units, Subcutaneous, 4x Daily AC  & at Bedtime, Basilio PETIT MD, 2 Units at 17 1214  •  magnesium hydroxide (MILK OF MAGNESIA) suspension 2400 mg/10mL 10 mL, 10 mL, Oral, Daily PRN, Titamaurice Jacksonft, APRN  •  metoprolol tartrate (LOPRESSOR) tablet 12.5 mg, 12.5 mg, Oral, Q12H, Tita A Jorgensen, APRN, 12.5 mg at 17 0811  •  morphine injection 4 mg, 4 mg, Intravenous, Q2H PRN, 2 mg at 17 0201 **AND** naloxone (NARCAN) injection 0.4 mg, 0.4 mg, Intravenous, Q5 Min PRN, Tita Jorgensen, APRN  •  mupirocin (BACTROBAN) 2 % nasal ointment 1 application, 1 application, Each Nare, Daily, Tita Jorgensen, APRN, 1 application at 17 0811  •  [] morphine injection 4 mg, 4 mg, Intravenous, Q15 Min PRN, 4 mg at 17 1836 **AND** naloxone (NARCAN) injection 0.4 mg, 0.4 mg, Intravenous, Q5 Min PRN, Tita Jorgensen, APRN  •  niCARdipine (CARDENE) 25 mg/250 mL 0.9% NS VTB (mbp), 5-15 mg/hr, Intravenous, Continuous PRN, Tita Jorgensen, APRN, Stopped at 17 0755  •  ondansetron (ZOFRAN) tablet 4 mg, 4 mg, Oral, Q6H PRN **OR** ondansetron ODT (ZOFRAN-ODT) disintegrating tablet 4 mg, 4 mg, Oral, Q6H PRN **OR** ondansetron (ZOFRAN) injection 4 mg, 4 mg, Intravenous, Q6H PRN, Daindra Suarez MD, 4 mg at 17 1047  •  ondansetron (ZOFRAN) injection 4 mg, 4 mg, Intravenous, Q6H PRN, Tita Jorgensen, APRN  •  oxyCODONE (ROXICODONE) immediate release tablet 10 mg, 10 mg, Oral, Q4H PRN, Tita Jacksonft, APRN, 10 mg at 17 0432  •  [DISCONTINUED] pantoprazole (PROTONIX) injection 40 mg, 40 mg, Intravenous, Q AM **OR** pantoprazole (PROTONIX) EC tablet 40 mg, 40 mg, Oral, Q AM, Tita Jorgensen, APRN, 40 mg at 17 0653  •  potassium chloride (MICRO-K) CR capsule 40 mEq, 40 mEq, Oral, PRN **OR** potassium chloride (KLOR-CON) packet 40 mEq, 40 mEq, Oral, PRN, Tita Jacksonft, APRN  •  potassium chloride (MICRO-K) CR capsule 40 mEq, 40 mEq, Oral, PRN **OR** potassium chloride (KLOR-CON) packet 40 mEq, 40 mEq, Oral, PRN **OR** potassium  chloride 10 mEq in 100 mL IVPB, 10 mEq, Intravenous, Q1H PRN, Alexandre Farrell MD  •  potassium chloride 10 mEq in 100 mL IVPB, 10 mEq, Intravenous, Q1H PRN **OR** potassium chloride 10 mEq in 100 mL IVPB, 10 mEq, Intravenous, Q1H PRN, Tita Jorgensen, APRALBERTO  •  [DISCONTINUED] promethazine (PHENERGAN) tablet 12.5 mg, 12.5 mg, Oral, Q6H PRN **OR** promethazine (PHENERGAN) injection 12.5 mg, 12.5 mg, Intravenous, Q6H PRN, Tita Jorgensen, APRN  •  sennosides-docusate sodium (SENOKOT-S) 8.6-50 MG tablet 2 tablet, 2 tablet, Oral, Nightly, Tita Jorgensen, APRN  •  sodium chloride 0.9 % flush 1-10 mL, 1-10 mL, Intravenous, PRN, Diandra Suarez MD  •  temazepam (RESTORIL) capsule 15 mg, 15 mg, Oral, Nightly PRN, Alexandre Farrell MD, 15 mg at 01/12/17 5221         Review of Systems   Unable to perform ROS: Other   All other systems reviewed and are negative.   patient is very drowsy due to pain medication at this time and chart was reviewed  Objective     Vital Signs  Temp:  [98.3 °F (36.8 °C)-100.4 °F (38 °C)] 98.3 °F (36.8 °C)  Heart Rate:  [56-89] 67  Resp:  [16-21] 16  BP: ()/(61-82) 147/82  Arterial Line BP: (103-148)/(44-63) 132/56    Physical Exam  Physical Exam   Constitutional: He appears well-developed and well-nourished.   Postop status   Eyes: EOM are normal. Pupils are equal, round, and reactive to light.   Neck: Normal range of motion. Neck supple. No thyromegaly present.   Cardiovascular: Normal rate, regular rhythm, normal heart sounds and intact distal pulses.    Pulmonary/Chest: Effort normal and breath sounds normal.   Abdominal: Soft. Bowel sounds are normal. He exhibits distension.   Musculoskeletal: Normal range of motion. He exhibits no edema.   Neurological:   Drowsy but arousable and answering very few questions   Skin: Skin is warm and dry.   Nursing note and vitals reviewed.      Results Review:    I reviewed the patient's new clinical results.  GLUCOSE   Date/Time Value Ref Range Status    01/14/2017 1621 139 (H) 70 - 130 mg/dL Final   01/14/2017 1301 169 (H) 70 - 130 mg/dL Final   01/14/2017 1210 163 (H) 70 - 130 mg/dL Final   01/14/2017 0421 128 70 - 130 mg/dL Final   01/14/2017 0325 124 70 - 130 mg/dL Final   01/14/2017 0159 120 70 - 130 mg/dL Final   01/14/2017 0123 122 70 - 130 mg/dL Final   01/14/2017 0012 109 70 - 130 mg/dL Final   01/13/2017 2327 105 70 - 130 mg/dL Final   01/13/2017 2226 114 70 - 130 mg/dL Final   01/13/2017 2113 150 (H) 70 - 130 mg/dL Final   01/13/2017 2019 150 (H) 70 - 130 mg/dL Final     Lab Results (last 72 hours)     Procedure Component Value Units Date/Time    CBC & Differential [[id number]] Collected:  01/12/17 0110    Specimen:  Blood Updated:  01/12/17 0125    Narrative:       The following orders were created for panel order CBC & Differential.  Procedure                               Abnormality         Status                     ---------                               -----------         ------                     CBC Auto Differential[[id number]]         Abnormal            Final result                 Please view results for these tests on the individual orders.    CBC Auto Differential [[id number]]  (Abnormal) Collected:  01/12/17 0110    Specimen:  Blood Updated:  01/12/17 0125     WBC 8.05 10*3/mm3      RBC 4.40 (L) 10*6/mm3      Hemoglobin 15.7 g/dL      Hematocrit 43.6 %      MCV 99.1 (H) fL      MCH 35.7 (H) pg      MCHC 36.0 g/dL      RDW 11.5 %      RDW-SD 41.8 fl      MPV 10.3 fL      Platelets 200 10*3/mm3      Neutrophil % 41.0 (L) %      Lymphocyte % 46.3 (H) %      Monocyte % 9.2 %      Eosinophil % 3.1 %      Basophil % 0.4 %      Immature Grans % 0.0 %      Neutrophils, Absolute 3.30 10*3/mm3      Lymphocytes, Absolute 3.73 10*3/mm3      Monocytes, Absolute 0.74 10*3/mm3      Eosinophils, Absolute 0.25 10*3/mm3      Basophils, Absolute 0.03 10*3/mm3      Immature Grans, Absolute 0.00 10*3/mm3     Comprehensive Metabolic Panel [[id number]]  (Abnormal)  Collected:  01/12/17 0110    Specimen:  Blood Updated:  01/12/17 0143     Glucose 234 (H) mg/dL      BUN 16 mg/dL      Creatinine 1.08 mg/dL      Sodium 140 mmol/L      Potassium 4.0 mmol/L      Chloride 100 mmol/L      CO2 26.3 mmol/L      Calcium 9.6 mg/dL      Total Protein 7.1 g/dL      Albumin 4.30 g/dL      ALT (SGPT) 48 (H) U/L      AST (SGOT) 31 U/L      Alkaline Phosphatase 77 U/L      Total Bilirubin 0.3 mg/dL      eGFR Non African Amer 73 mL/min/1.73      Globulin 2.8 gm/dL      A/G Ratio 1.5 g/dL      BUN/Creatinine Ratio 14.8      Anion Gap 13.7 mmol/L     Troponin [[id number]]  (Normal) Collected:  01/12/17 0110    Specimen:  Blood Updated:  01/12/17 0143     Troponin T <0.010 ng/mL     Narrative:       Troponin T Reference Ranges:  Less than 0.03 ng/mL:    Negative for AMI  0.03 to 0.09 ng/mL:      Indeterminant for AMI  Greater than 0.09 ng/mL: Positive for AMI    Troponin [[id number]]  (Normal) Collected:  01/12/17 0659    Specimen:  Blood Updated:  01/12/17 0754     Troponin T <0.010 ng/mL     Narrative:       Troponin T Reference Ranges:  Less than 0.03 ng/mL:    Negative for AMI  0.03 to 0.09 ng/mL:      Indeterminant for AMI  Greater than 0.09 ng/mL: Positive for AMI    Platelet Function ADP [[id number]] Collected:  01/12/17 1953    Specimen:  Blood Updated:  01/12/17 2009     ADP Aggregation, % Platelet 74 %     CBC & Differential [[id number]] Collected:  01/12/17 1953    Specimen:  Blood Updated:  01/12/17 2014    Narrative:       The following orders were created for panel order CBC & Differential.  Procedure                               Abnormality         Status                     ---------                               -----------         ------                     CBC Auto Differential[[id number]]         Abnormal            Final result                 Please view results for these tests on the individual orders.    CBC Auto Differential [[id number]]  (Abnormal) Collected:  01/12/17 1953     Specimen:  Blood Updated:  01/12/17 2014     WBC 7.22 10*3/mm3      RBC 4.58 (L) 10*6/mm3      Hemoglobin 15.7 g/dL      Hematocrit 45.5 %      MCV 99.3 (H) fL      MCH 34.3 (H) pg      MCHC 34.5 g/dL      RDW 11.6 %      RDW-SD 41.7 fl      MPV 10.3 fL      Platelets 196 10*3/mm3      Neutrophil % 47.1 %      Lymphocyte % 42.5 %      Monocyte % 6.5 %      Eosinophil % 3.2 %      Basophil % 0.4 %      Immature Grans % 0.3 %      Neutrophils, Absolute 3.40 10*3/mm3      Lymphocytes, Absolute 3.07 10*3/mm3      Monocytes, Absolute 0.47 10*3/mm3      Eosinophils, Absolute 0.23 10*3/mm3      Basophils, Absolute 0.03 10*3/mm3      Immature Grans, Absolute 0.02 10*3/mm3     Hemoglobin A1c [[id number]]  (Abnormal) Collected:  01/12/17 1953    Specimen:  Blood Updated:  01/12/17 2021     Hemoglobin A1C 7.20 (H) %     Narrative:       Hemoglobin A1C Ranges:    Increased Risk for Diabetes  5.7% to 6.4%  Diabetes                     >= 6.5%  Diabetic Goal                < 7.0%    Comprehensive Metabolic Panel [[id number]]  (Abnormal) Collected:  01/12/17 1953    Specimen:  Blood Updated:  01/12/17 2034     Glucose 167 (H) mg/dL      BUN 10 mg/dL      Creatinine 0.86 mg/dL      Sodium 142 mmol/L      Potassium 4.1 mmol/L      Chloride 101 mmol/L      CO2 26.1 mmol/L      Calcium 9.5 mg/dL      Total Protein 7.1 g/dL      Albumin 4.40 g/dL      ALT (SGPT) 51 (H) U/L      AST (SGOT) 32 U/L      Alkaline Phosphatase 73 U/L      Total Bilirubin 0.4 mg/dL      eGFR Non African Amer 95 mL/min/1.73      Globulin 2.7 gm/dL      A/G Ratio 1.6 g/dL      BUN/Creatinine Ratio 11.6      Anion Gap 14.9 mmol/L     Lipid Panel [[id number]]  (Abnormal) Collected:  01/12/17 1953    Specimen:  Blood Updated:  01/12/17 2034     Total Cholesterol 144 mg/dL      Triglycerides 165 (H) mg/dL      HDL Cholesterol 29 (L) mg/dL      LDL Cholesterol  82 mg/dL      VLDL Cholesterol 33 mg/dL      LDL/HDL Ratio 2.83     Narrative:       Cholesterol Reference  Ranges  (U.S. Department of Health and Human Services ATP III Classifications)    Desirable          <200 mg/dL  Borderline High    200-239 mg/dL  High Risk          >240 mg/dL      Triglyceride Reference Ranges  (U.S. Department of Health and Human Services ATP III Classifications)    Normal           <150 mg/dL  Borderline High  150-199 mg/dL  High             200-499 mg/dL  Very High        >500 mg/dL    HDL Reference Ranges  (U.S. Department of Health and Human Services ATP III Classifcations)    Low     <40 mg/dl (major risk factor for CHD)  High    >60 mg/dl ('negative' risk factor for CHD)        LDL Reference Ranges  (U.S. Department of Health and Human Services ATP III Classifcations)    Optimal          <100 mg/dL  Near Optimal     100-129 mg/dL  Borderline High  130-159 mg/dL  High             160-189 mg/dL  Very High        >189 mg/dL    Urinalysis With / Culture If Indicated [[id number]]  (Normal) Collected:  01/12/17 2110    Specimen:  Urine from Urine, Clean Catch Updated:  01/12/17 2122     Color, UA Yellow      Appearance, UA Clear      pH, UA 7.0      Specific Gravity, UA 1.021      Glucose, UA Negative      Ketones, UA Negative      Bilirubin, UA Negative      Blood, UA Negative      Protein, UA Negative      Leuk Esterase, UA Negative      Nitrite, UA Negative      Urobilinogen, UA 1.0 E.U./dL     Narrative:       Urine microscopic not indicated.    Blood Gas, Arterial [[id number]]  (Abnormal) Collected:  01/13/17 0414    Specimen:  Arterial Blood Updated:  01/13/17 0416     Site Arterial: right brachial      Sameer's Test N/A      pH, Arterial 7.407 pH units      pCO2, Arterial 43.0 mm Hg      pO2, Arterial 77.3 (L) mm Hg      HCO3, Arterial 27.1 mmol/L      Base Excess, Arterial 1.9 mmol/L      O2 Saturation Calculated 95.3 %      Barometric Pressure for Blood Gas 763.1 mmHg      Modality Room air      Set [facility] Resp Rate 20      Rate 20 Breaths/minute     Narrative:       sat 96% Meter:  [id number] : [id number] Kenny Ohara    Basic Metabolic Panel [[id number]]  (Abnormal) Collected:  01/13/17 0349    Specimen:  Blood Updated:  01/13/17 0546     Glucose 294 (H) mg/dL      BUN 11 mg/dL      Creatinine 0.93 mg/dL      Sodium 139 mmol/L      Potassium 4.2 mmol/L      Chloride 101 mmol/L      CO2 24.3 mmol/L      Calcium 9.0 mg/dL      eGFR Non African Amer 87 mL/min/1.73      BUN/Creatinine Ratio 11.8      Anion Gap 13.7 mmol/L     Narrative:       GFR Normal >60  Chronic Kidney Disease <60  Kidney Failure <15    POC Activated Clotting Time [[id number]]  (Abnormal) Collected:  01/12/17 0920    Specimen:  Blood Updated:  01/13/17 0706     Activated Clotting Time  250 (H) Seconds       Serial Number: [id number]    : [id number]       POC Activated Clotting Time [[id number]]  (Normal) Collected:  01/13/17 0710    Specimen:  Blood Updated:  01/13/17 1140     Activated Clotting Time  131 Seconds       Serial Number: [id number]    : 3361       POC OR Panel, ISTAT [[id number]]  (Abnormal) Collected:  01/13/17 0710    Specimen:  Blood Updated:  01/13/17 1140     Potassium, Arterial 4.1 mmol/L      Total CO2 27 mmol/L       Serial Number: [id number]    : 3361        Hemoglobin 14.3 g/dL      Hematocrit 42 %      pH, Arterial 7.35 pH units      HCO3, Arterial 25.3 mmol/L      Base Excess 0.0000 mmol/L      O2 Saturation, Arterial 97 %      pO2, Arterial 92 mmHg      pCO2, Arterial 45.7 (H) mm Hg      Glucose 209 (H) mg/dL     POC Activated Clotting Time [[id number]]  (Abnormal) Collected:  01/13/17 0936    Specimen:  Blood Updated:  01/13/17 1141     Activated Clotting Time  358 (H) Seconds       Serial Number: [id number]    : 3361       POC OR Panel, ISTAT [[id number]]  (Abnormal) Collected:  01/13/17 0936    Specimen:  Blood Updated:  01/13/17 1141     Potassium, Arterial 4.3 mmol/L      Total CO2 34 (H) mmol/L       Serial Number: [id number]    : 3361        Hemoglobin 11.2 (L) g/dL       Hematocrit 33 (L) %      pH, Arterial 7.33 (L) pH units      HCO3, Arterial 31.7 (H) mmol/L      Base Excess 6.0000 (H) mmol/L      O2 Saturation, Arterial 100 (H) %      pO2, Arterial 605 (H) mmHg      pCO2, Arterial 60.4 (H) mm Hg      Glucose 169 (H) mg/dL     POC OR Panel, ISTAT [[id number]]  (Abnormal) Collected:  01/13/17 0943    Specimen:  Blood Updated:  01/13/17 1142     Potassium, Arterial 4.7 mmol/L      Total CO2 30 (H) mmol/L       Serial Number: [id number]    : 3361        Hemoglobin 11.6 (L) g/dL      Hematocrit 34 (L) %      pH, Arterial 7.30 (L) pH units      HCO3, Arterial 28.0 (H) mmol/L      Base Excess 2.0000 mmol/L      O2 Saturation, Arterial 82 (L) %      pO2, Arterial 53 (L) mmHg      pCO2, Arterial 56.6 (H) mm Hg      Glucose 166 (H) mg/dL     POC Activated Clotting Time [[id number]]  (Abnormal) Collected:  01/13/17 1003    Specimen:  Blood Updated:  01/13/17 1142     Activated Clotting Time  353 (H) Seconds       Serial Number: [id number]    : 3361       POC OR Panel, ISTAT [[id number]]  (Abnormal) Collected:  01/13/17 1003    Specimen:  Blood Updated:  01/13/17 1142     Potassium, Arterial 5.6 (H) mmol/L      Total CO2 31 (H) mmol/L       Serial Number: [id number]    : 3361        Hemoglobin 11.9 (L) g/dL      Hematocrit 35 (L) %      pH, Arterial 7.38 pH units      HCO3, Arterial 29.9 (H) mmol/L      Base Excess 5.0000 mmol/L      O2 Saturation, Arterial 100 (H) %      pO2, Arterial 423 (H) mmHg      pCO2, Arterial 50.7 (H) mm Hg      Glucose 173 (H) mg/dL     POC Activated Clotting Time [[id number]]  (Abnormal) Collected:  01/13/17 1033    Specimen:  Blood Updated:  01/13/17 1143     Activated Clotting Time  332 (H) Seconds       Serial Number: [id number]    : 3361       POC OR Panel, ISTAT [[id number]]  (Abnormal) Collected:  01/13/17 1034    Specimen:  Blood Updated:  01/13/17 1143     Potassium, Arterial 5.6 (H) mmol/L      Total CO2 30 (H) mmol/L       Serial Number: [id number]     : 3361        Hemoglobin 11.6 (L) g/dL      Hematocrit 34 (L) %      pH, Arterial 7.37 pH units      HCO3, Arterial 28.8 (H) mmol/L      Base Excess 3.0000 mmol/L      O2 Saturation, Arterial 100 (H) %      pO2, Arterial 400 (H) mmHg      pCO2, Arterial 50.4 (H) mm Hg      Glucose 177 (H) mg/dL     POC Activated Clotting Time [[id number]]  (Normal) Collected:  01/13/17 1059    Specimen:  Blood Updated:  01/13/17 1143     Activated Clotting Time  100 Seconds       Serial Number: [id number]    : 3361       POC OR Panel, ISTAT [[id number]]  (Abnormal) Collected:  01/13/17 1059    Specimen:  Blood Updated:  01/13/17 1143     Potassium, Arterial 5.0 (H) mmol/L      Total CO2 27 mmol/L       Serial Number: [id number]    : 3361        Hemoglobin 11.6 (L) g/dL      Hematocrit 34 (L) %      pH, Arterial 7.34 (L) pH units      HCO3, Arterial 25.7 mmol/L      Base Excess 0.0000 mmol/L      O2 Saturation, Arterial 99 (H) %      pO2, Arterial 152 (H) mmHg      pCO2, Arterial 47.6 (H) mm Hg      Glucose 164 (H) mg/dL     CBC & Differential [[id number]] Collected:  01/13/17 1201    Specimen:  Blood Updated:  01/13/17 1221    Narrative:       The following orders were created for panel order CBC & Differential.  Procedure                               Abnormality         Status                     ---------                               -----------         ------                     CBC Auto Differential[[id number]]         Abnormal            Final result                 Please view results for these tests on the individual orders.    CBC Auto Differential [[id number]]  (Abnormal) Collected:  01/13/17 1201    Specimen:  Blood Updated:  01/13/17 1221     WBC 8.75 10*3/mm3      RBC 4.24 (L) 10*6/mm3      Hemoglobin 14.6 g/dL      Hematocrit 41.2 %      MCV 97.2 (H) fL      MCH 34.4 (H) pg      MCHC 35.4 g/dL      RDW 11.7 %      RDW-SD 41.4 fl      MPV 10.4 fL      Platelets 113 (L) 10*3/mm3      Neutrophil % 75.2 %       Lymphocyte % 19.8 %      Monocyte % 3.2 (L) %      Eosinophil % 1.5 %      Basophil % 0.1 %      Immature Grans % 0.2 %      Neutrophils, Absolute 6.58 10*3/mm3      Lymphocytes, Absolute 1.73 10*3/mm3      Monocytes, Absolute 0.28 10*3/mm3      Eosinophils, Absolute 0.13 10*3/mm3      Basophils, Absolute 0.01 10*3/mm3      Immature Grans, Absolute 0.02 10*3/mm3     POC Glucose Fingerstick [[id number]]  (Normal) Collected:  01/13/17 1202    Specimen:  Blood Updated:  01/13/17 1224     Glucose 130 mg/dL     Narrative:       Meter: [id number] : [id number] Viglione Kari    Blood Gas, Arterial [[id number]]  (Abnormal) Collected:  01/13/17 1227    Specimen:  Arterial Blood Updated:  01/13/17 1229     Site Arterial Line      Sameer's Test N/A      pH, Arterial 7.357 pH units      pCO2, Arterial 44.7 mm Hg      pO2, Arterial 253.3 (H) mm Hg      HCO3, Arterial 25.0 mmol/L      Base Excess, Arterial -0.7 (L) mmol/L      O2 Saturation Calculated 99.8 (H) %      A-a Gradiant 0.4 mmHg      Barometric Pressure for Blood Gas 765.7 mmHg      Modality Adult Vent      FIO2 100 %      Ventilator Mode VC      Set Tidal Volume 800      Set Mech Resp Rate 12      Rate 12 Breaths/minute      PEEP 7.5     Narrative:       Meter: [id number] : [id number] Graf Stapleton    Calcium, Ionized [[id number]]  (Normal) Collected:  01/13/17 1201    Specimen:  Blood Updated:  01/13/17 1246     Ionized Calcium 1.25 mmol/L      Ionized Calcium 5.0 mg/dL     Magnesium [[id number]]  (Abnormal) Collected:  01/13/17 1200    Specimen:  Blood Updated:  01/13/17 1247     Magnesium 2.9 (H) mg/dL     Renal Function Panel [[id number]]  (Abnormal) Collected:  01/13/17 1200    Specimen:  Blood Updated:  01/13/17 1254     Glucose 132 (H) mg/dL      BUN 11 mg/dL      Creatinine 1.24 mg/dL      Sodium 143 mmol/L      Potassium 4.0 mmol/L      Chloride 104 mmol/L      CO2 25.7 mmol/L      Calcium 9.3 mg/dL      Albumin 4.40 g/dL      Phosphorus 3.1 mg/dL      Anion  Gap 13.3 mmol/L      BUN/Creatinine Ratio 8.9      eGFR Non African Amer 62 mL/min/1.73     Protime-INR [[id number]]  (Abnormal) Collected:  01/13/17 1201    Specimen:  Blood Updated:  01/13/17 1258     Protime 15.2 (H) Seconds      INR 1.25 (H)     aPTT [[id number]]  (Normal) Collected:  01/13/17 1201    Specimen:  Blood Updated:  01/13/17 1258     PTT 25.7 seconds     POC Glucose Fingerstick [[id number]]  (Normal) Collected:  01/13/17 1329    Specimen:  Blood Updated:  01/13/17 1332     Glucose 96 mg/dL     Narrative:       Meter: [id number] : [id number] Keaton Enriquez    POC Glucose Fingerstick [[id number]]  (Normal) Collected:  01/13/17 1449    Specimen:  Blood Updated:  01/13/17 1450     Glucose 90 mg/dL     Narrative:       Meter: [id number] : [id number] Keaton Enriquez    Blood Gas, Arterial [[id number]]  (Abnormal) Collected:  01/13/17 1528    Specimen:  Arterial Blood Updated:  01/13/17 1530     Site Arterial Line      Sameer's Test N/A      pH, Arterial 7.403 pH units      pCO2, Arterial 41.9 mm Hg      pO2, Arterial 116.7 (H) mm Hg      HCO3, Arterial 26.1 mmol/L      Base Excess, Arterial 1.1 mmol/L      O2 Saturation Calculated 98.6 %      A-a Gradiant 0.5 mmHg      Barometric Pressure for Blood Gas 765.5 mmHg      Modality Adult Vent      FIO2 40 %      Ventilator Mode VC      Set Tidal Volume 800      Set [facility] Resp Rate 14      Rate 14 Breaths/minute      PEEP 7.5     Narrative:       Meter: [id number] : [id number] Graf Stapleton    CBC (No Diff) [[id number]]  (Abnormal) Collected:  01/13/17 1529    Specimen:  Blood Updated:  01/13/17 1544     WBC 10.80 (H) 10*3/mm3      RBC 4.09 (L) 10*6/mm3      Hemoglobin 14.3 g/dL      Hematocrit 39.8 (L) %      MCV 97.3 (H) fL      MCH 35.0 (H) pg      MCHC 35.9 g/dL      RDW 11.8 %      RDW-SD 42.0 fl      MPV 10.4 fL      Platelets 117 (L) 10*3/mm3     Renal Function Panel [[id number]]  (Abnormal) Collected:  01/13/17 1529    Specimen:  Blood Updated:  01/13/17 160      Glucose 102 (H) mg/dL      BUN 11 mg/dL      Creatinine 1.07 mg/dL      Sodium 145 mmol/L      Potassium 4.3 mmol/L      Chloride 107 mmol/L      CO2 23.6 mmol/L      Calcium 9.1 mg/dL      Albumin 4.60 g/dL      Phosphorus 3.6 mg/dL      Anion Gap 14.4 mmol/L      BUN/Creatinine Ratio 10.3      eGFR Non African Amer 74 mL/min/1.73     Magnesium [[id number]]  (Normal) Collected:  01/13/17 1529    Specimen:  Blood Updated:  01/13/17 1602     Magnesium 2.4 mg/dL     POC Glucose Fingerstick [[id number]]  (Abnormal) Collected:  01/13/17 1708    Specimen:  Blood Updated:  01/13/17 1728     Glucose 175 (H) mg/dL     Narrative:       Meter: [id number] : [id number] Keaton Enriquez    Blood Gas, Arterial [[id number]]  (Abnormal) Collected:  01/13/17 1747    Specimen:  Arterial Blood Updated:  01/13/17 1804     Site Arterial Line      Sameer's Test N/A      pH, Arterial 7.330 (L) pH units      pCO2, Arterial 49.2 (H) mm Hg      pO2, Arterial 112.9 (H) mm Hg      HCO3, Arterial 26.0 mmol/L      Base Excess, Arterial -0.6 (L) mmol/L      O2 Saturation Calculated 98.0 %      A-a Gradiant 0.5 mmHg      Barometric Pressure for Blood Gas 766.2 mmHg      Modality Adult Vent      FIO2 40 %      Ventilator Mode PS      Set Tidal Volume 700      Set Mech Resp Rate 14      PEEP 7.5      PSV 8 cmH2O     Narrative:       Meter: [id number] : [id number] Graf Stapleton    POC Glucose Fingerstick [[id number]]  (Abnormal) Collected:  01/13/17 1840    Specimen:  Blood Updated:  01/13/17 1844     Glucose 152 (H) mg/dL     Narrative:       Meter: [id number] : [id number] Keaton Enriquez    POC Glucose Fingerstick [[id number]]  (Abnormal) Collected:  01/13/17 1942    Specimen:  Blood Updated:  01/13/17 1943     Glucose 157 (H) mg/dL     Narrative:       Meter: [id number] : [id number] Juan Ceja    Potassium [[id number]]  (Normal) Collected:  01/13/17 1944    Specimen:  Blood Updated:  01/13/17 2016     Potassium 4.8 mmol/L     POC Glucose  Fingerstick [[id number]]  (Abnormal) Collected:  01/13/17 2019    Specimen:  Blood Updated:  01/13/17 2021     Glucose 150 (H) mg/dL     Narrative:       Meter: [id number] : [id number] Juan Ceja    POC Glucose Fingerstick [[id number]]  (Abnormal) Collected:  01/13/17 2113    Specimen:  Blood Updated:  01/13/17 2115     Glucose 150 (H) mg/dL     Narrative:       Meter: [id number] : [id number] Juan Ceja    POC Glucose Fingerstick [[id number]]  (Normal) Collected:  01/13/17 2226    Specimen:  Blood Updated:  01/13/17 2237     Glucose 114 mg/dL     Narrative:       Meter: [id number] : [id number] Juan Ceja    POC Glucose Fingerstick [[id number]]  (Normal) Collected:  01/13/17 2327    Specimen:  Blood Updated:  01/13/17 2331     Glucose 105 mg/dL     Narrative:       Meter: [id number] : [id number] Juan Ceja    POC Glucose Fingerstick [[id number]]  (Normal) Collected:  01/14/17 0012    Specimen:  Blood Updated:  01/14/17 0013     Glucose 109 mg/dL     Narrative:       Meter: [id number] : [id number] Juan Ceja    POC Glucose Fingerstick [[id number]]  (Normal) Collected:  01/14/17 0123    Specimen:  Blood Updated:  01/14/17 0125     Glucose 122 mg/dL     Narrative:       Meter: [id number] : [id number] Juan Ceja    POC Glucose Fingerstick [[id number]]  (Normal) Collected:  01/14/17 0159    Specimen:  Blood Updated:  01/14/17 0211     Glucose 120 mg/dL     Narrative:       Meter: [id number] : [id number] Juan Ceja    POC Glucose Fingerstick [[id number]]  (Normal) Collected:  01/14/17 0325    Specimen:  Blood Updated:  01/14/17 0326     Glucose 124 mg/dL     Narrative:       Meter: [id number] : [id number] Juan Ceja    CBC & Differential [[id number]] Collected:  01/14/17 0329    Specimen:  Blood Updated:  01/14/17 0342    Narrative:       The following orders were created for panel order CBC & Differential.  Procedure                                Abnormality         Status                     ---------                               -----------         ------                     CBC Auto Differential[[id number]]         Abnormal            Final result                 Please view results for these tests on the individual orders.    CBC Auto Differential [[id number]]  (Abnormal) Collected:  01/14/17 0329    Specimen:  Blood Updated:  01/14/17 0342     WBC 11.02 (H) 10*3/mm3      RBC 3.93 (L) 10*6/mm3      Hemoglobin 13.4 (L) g/dL      Hematocrit 40.1 (L) %      .0 (H) fL      MCH 34.1 (H) pg      MCHC 33.4 g/dL      RDW 11.9 %      RDW-SD 44.2 fl      MPV 10.3 fL      Platelets 128 (L) 10*3/mm3      Neutrophil % 77.1 (H) %      Lymphocyte % 12.2 (L) %      Monocyte % 10.0 %      Eosinophil % 0.1 (L) %      Basophil % 0.1 %      Immature Grans % 0.5 %      Neutrophils, Absolute 8.51 (H) 10*3/mm3      Lymphocytes, Absolute 1.34 10*3/mm3      Monocytes, Absolute 1.10 10*3/mm3      Eosinophils, Absolute 0.01 10*3/mm3      Basophils, Absolute 0.01 10*3/mm3      Immature Grans, Absolute 0.05 (H) 10*3/mm3     Protime-INR [[id number]]  (Abnormal) Collected:  01/14/17 0329    Specimen:  Blood Updated:  01/14/17 0348     Protime 15.3 (H) Seconds      INR 1.26 (H)     Magnesium [[id number]]  (Normal) Collected:  01/14/17 0329    Specimen:  Blood Updated:  01/14/17 0401     Magnesium 2.4 mg/dL     Renal Function Panel [[id number]]  (Abnormal) Collected:  01/14/17 0329    Specimen:  Blood Updated:  01/14/17 0401     Glucose 132 (H) mg/dL      BUN 12 mg/dL      Creatinine 1.02 mg/dL      Sodium 145 mmol/L      Potassium 4.3 mmol/L      Chloride 103 mmol/L      CO2 26.3 mmol/L      Calcium 9.1 mg/dL      Albumin 5.10 g/dL      Phosphorus 5.5 (H) mg/dL      Anion Gap 15.7 mmol/L      BUN/Creatinine Ratio 11.8      eGFR Non African Amer 78 mL/min/1.73     POC Glucose Fingerstick [[id number]]  (Normal) Collected:  01/14/17 0421    Specimen:  Blood Updated:  01/14/17 0423     Glucose  128 mg/dL     Narrative:       Meter: [id number] : [id number] Juan Ceja    POC Glucose Fingerstick [[id number]]  (Abnormal) Collected:  01/14/17 1210    Specimen:  Blood Updated:  01/14/17 1211     Glucose 163 (H) mg/dL     Narrative:       Meter: [id number] : [id number] Nano Rivera    POC Glucose Fingerstick [[id number]]  (Abnormal) Collected:  01/14/17 1301    Specimen:  Blood Updated:  01/14/17 1302     Glucose 169 (H) mg/dL     Narrative:       Meter: [id number] : [id number] Everett Haven    POC Glucose Fingerstick [[id number]]  (Abnormal) Collected:  01/14/17 1621    Specimen:  Blood Updated:  01/14/17 1622     Glucose 139 (H) mg/dL     Narrative:       Meter: [id number] : [id number] Everett Haven          Imaging Results (last 72 hours)     Procedure Component Value Units Date/Time    XR Chest PA & Lateral [[id number]] Collected:  01/12/17 1917     Updated:  01/12/17 2003    Narrative:       PA AND LATERAL CHEST     HISTORY: Preoperative exam for open heart surgery. History of  hypertension.      COMPARISON: 2 view chest 01/12/2017.     FINDINGS: The heart and mediastinal structures appear normal. There is  minimal linear scarring or atelectasis in the left base without change.  There is no pulmonary edema or pleural effusion or infiltrate.       Impression:       No acute disease.     This report was finalized on 1/12/2017 8:00 PM by Dr. Tristan Og MD.       XR Chest 2 View [[id number]] Collected:  01/12/17 0730     Updated:  01/12/17 2102    Narrative:       2-VIEW CHEST     HISTORY: Chest pain.     FINDINGS: The lungs are well-expanded and clear and the heart and hilar  structures appear normal. No acute abnormality is seen.     This report was finalized on 1/12/2017 8:59 PM by Dr. Charles Mobley MD.       XR Chest 1 View [[id number]] Collected:  01/13/17 1236     Updated:  01/13/17 1338    Narrative:       AP CHEST - 01/13/2017     HISTORY: Postop open heart surgery.     FINDINGS:   The heart size is within normal limits. There is some patchy  atelectasis or developing infiltrate in the left infrahilar region.  Lungs are underinflated. No pneumothorax is seen.     Endotracheal tube is seen in good position with its tip overlying the  trachea at the level of the clavicles. Right internal jugular [place]  catheter is seen with its tip overlying the right pulmonary artery.  Bilateral chest tubes are seen in good position.     Sternotomy wires are seen.       Impression:       1. Satisfactory position of life-support devices.  2. No pneumothorax is seen.  3. Patchy atelectasis or infiltrate in the left infrahilar region.     This report was finalized on 1/13/2017 1:35 PM by Dr. Percy Gallardo MD.       XR Chest 1 View [[id number]] Collected:  01/14/17 0543     Updated:  01/14/17 0547    Narrative:       ONE VIEW PORTABLE CHEST AT 4:08 AM     HISTORY: Recent cardiac surgery. Chest tubes.     The lungs are moderately expanded with bilateral chest tubes in place  and there is no pneumothorax. There is slight worsening of scattered  atelectasis in the lower left lung compared to yesterday's examination.  The heart remains mildly enlarged. The ET tube has been removed and the  PA line has been replaced by a right jugular sheath.     This report was finalized on 1/14/2017 5:44 AM by Dr. Charles Mobley MD.         Radiology images personally reviewed by me    Assessment/Plan   Patient Active Problem List   Diagnosis   • Chest pain at rest    uncontrolled type 2 diabetes mellitus  Status post coronary artery bypass grafting postop day 1  Obesity  Hyperlipidemia  Hypertension    Hemoglobin A1c 7.2%  Triglycerides are elevated.  Will continue the patient on sliding scale insulin for now  Monitor Accu-Cheks over the next 24 hours and then decide the proper management.  He may be able to restart at least Januvia once he started eating  Most coronary artery bypass grafting patients require some basal  insulin at that time of discharge but will follow him closely over the next few days and decide the appropriate home management  Also as the diet advances it is more clear as to what medication he could use to control his blood sugars.  Patient briefly opened his eyes and verbalized understanding.  I also discussed my plan with Dr. Farrell    The total time spent for old record and lab review and face- to- face was more than 110 min of which greater than 50% of time was spent on counseling the patient on recommended evaluation and treatment options, instructions for management/treatment and /or follow up  and importance of compliance with chosen management or treatment options  Basilio Sharp MD FACE.  01/14/17  5:14 PM        EMR Dragon / transcription disclaimer:    Much of this encounter note is an electronic transcription/ translation of spoken language to printed text.  Electronic translation of spoken language may permit erroneous, or at times, nonsensical words or phrases to be inadvertently transcribed; although I have reviewed the note for such errors, some may still exist.

## 2017-01-14 NOTE — PLAN OF CARE
Problem: Patient Care Overview (Adult)  Goal: Plan of Care Review  Outcome: Ongoing (interventions implemented as appropriate)    01/14/17 0913   Coping/Psychosocial Response Interventions   Plan Of Care Reviewed With patient   Outcome Evaluation   Outcome Summary/Follow up Plan Pt presents with impaired functional mobility and gait secondary to generalized weakness and decreased activity tolerance post-op CABG. Pt may benefit from skilled PT to address these deficits.         Problem: Inpatient Physical Therapy  Goal: Cardiopulmonary Goal LTG- PT  Outcome: Ongoing (interventions implemented as appropriate)    01/14/17 0913   Cardiopulmonary PT LTG   Cardiopulmonary PT LTG, Time to Achieve 1 wk   Cardiopulmonary PT LTG, Level Level V

## 2017-01-14 NOTE — PROGRESS NOTES
Normal course so far.  Ask endocrinology to see for his diabetes.  May need more than Lopressor for his hypertension.

## 2017-01-15 ENCOUNTER — APPOINTMENT (OUTPATIENT)
Dept: GENERAL RADIOLOGY | Facility: HOSPITAL | Age: 47
End: 2017-01-15

## 2017-01-15 LAB
ANION GAP SERPL CALCULATED.3IONS-SCNC: 15.7 MMOL/L
BUN BLD-MCNC: 11 MG/DL (ref 6–20)
BUN/CREAT SERPL: 13.1 (ref 7–25)
CALCIUM SPEC-SCNC: 9.4 MG/DL (ref 8.6–10.5)
CHLORIDE SERPL-SCNC: 98 MMOL/L (ref 98–107)
CO2 SERPL-SCNC: 24.3 MMOL/L (ref 22–29)
CREAT BLD-MCNC: 0.84 MG/DL (ref 0.76–1.27)
DEPRECATED RDW RBC AUTO: 43.4 FL (ref 37–54)
ERYTHROCYTE [DISTWIDTH] IN BLOOD BY AUTOMATED COUNT: 11.9 % (ref 11.5–14.5)
GFR SERPL CREATININE-BSD FRML MDRD: 98 ML/MIN/1.73
GLUCOSE BLD-MCNC: 185 MG/DL (ref 65–99)
GLUCOSE BLDC GLUCOMTR-MCNC: 155 MG/DL (ref 70–130)
GLUCOSE BLDC GLUCOMTR-MCNC: 214 MG/DL (ref 70–130)
GLUCOSE BLDC GLUCOMTR-MCNC: 214 MG/DL (ref 70–130)
GLUCOSE BLDC GLUCOMTR-MCNC: 227 MG/DL (ref 70–130)
HCT VFR BLD AUTO: 40.8 % (ref 40.4–52.2)
HGB BLD-MCNC: 14 G/DL (ref 13.7–17.6)
MCH RBC QN AUTO: 34.6 PG (ref 27–32.7)
MCHC RBC AUTO-ENTMCNC: 34.3 G/DL (ref 32.6–36.4)
MCV RBC AUTO: 100.7 FL (ref 79.8–96.2)
PLATELET # BLD AUTO: 142 10*3/MM3 (ref 140–500)
PMV BLD AUTO: 10.8 FL (ref 6–12)
POTASSIUM BLD-SCNC: 4.4 MMOL/L (ref 3.5–5.2)
RBC # BLD AUTO: 4.05 10*6/MM3 (ref 4.6–6)
SODIUM BLD-SCNC: 138 MMOL/L (ref 136–145)
WBC NRBC COR # BLD: 12.74 10*3/MM3 (ref 4.5–10.7)

## 2017-01-15 PROCEDURE — 93010 ELECTROCARDIOGRAM REPORT: CPT | Performed by: INTERNAL MEDICINE

## 2017-01-15 PROCEDURE — 71010 HC CHEST PA OR AP: CPT

## 2017-01-15 PROCEDURE — 94799 UNLISTED PULMONARY SVC/PX: CPT

## 2017-01-15 PROCEDURE — 99233 SBSQ HOSP IP/OBS HIGH 50: CPT | Performed by: INTERNAL MEDICINE

## 2017-01-15 PROCEDURE — 80048 BASIC METABOLIC PNL TOTAL CA: CPT | Performed by: NURSE PRACTITIONER

## 2017-01-15 PROCEDURE — 93005 ELECTROCARDIOGRAM TRACING: CPT | Performed by: NURSE PRACTITIONER

## 2017-01-15 PROCEDURE — 93005 ELECTROCARDIOGRAM TRACING: CPT | Performed by: INTERNAL MEDICINE

## 2017-01-15 PROCEDURE — 25010000002 ENOXAPARIN PER 10 MG: Performed by: NURSE PRACTITIONER

## 2017-01-15 PROCEDURE — 63710000001 INSULIN ASPART PER 5 UNITS: Performed by: INTERNAL MEDICINE

## 2017-01-15 PROCEDURE — 94762 N-INVAS EAR/PLS OXIMTRY CONT: CPT

## 2017-01-15 PROCEDURE — 25010000003 CEFAZOLIN IN DEXTROSE 2-4 GM/100ML-% SOLUTION: Performed by: NURSE PRACTITIONER

## 2017-01-15 PROCEDURE — 85027 COMPLETE CBC AUTOMATED: CPT | Performed by: NURSE PRACTITIONER

## 2017-01-15 PROCEDURE — 82962 GLUCOSE BLOOD TEST: CPT

## 2017-01-15 PROCEDURE — 97110 THERAPEUTIC EXERCISES: CPT

## 2017-01-15 RX ORDER — DILTIAZEM HYDROCHLORIDE 5 MG/ML
10 INJECTION INTRAVENOUS ONCE
Status: COMPLETED | OUTPATIENT
Start: 2017-01-15 | End: 2017-01-15

## 2017-01-15 RX ADMIN — OXYCODONE HYDROCHLORIDE 10 MG: 5 TABLET ORAL at 15:05

## 2017-01-15 RX ADMIN — GUAIFENESIN 1200 MG: 600 TABLET, EXTENDED RELEASE ORAL at 09:16

## 2017-01-15 RX ADMIN — ACETAMINOPHEN 650 MG: 325 TABLET ORAL at 02:26

## 2017-01-15 RX ADMIN — OXYCODONE HYDROCHLORIDE 10 MG: 5 TABLET ORAL at 19:08

## 2017-01-15 RX ADMIN — INSULIN ASPART 2 UNITS: 100 INJECTION, SOLUTION INTRAVENOUS; SUBCUTANEOUS at 20:43

## 2017-01-15 RX ADMIN — INSULIN ASPART 2 UNITS: 100 INJECTION, SOLUTION INTRAVENOUS; SUBCUTANEOUS at 07:00

## 2017-01-15 RX ADMIN — OXYCODONE HYDROCHLORIDE 10 MG: 5 TABLET ORAL at 02:26

## 2017-01-15 RX ADMIN — ENOXAPARIN SODIUM 40 MG: 40 INJECTION SUBCUTANEOUS at 18:44

## 2017-01-15 RX ADMIN — DILTIAZEM HYDROCHLORIDE 10 MG/HR: 100 INJECTION, POWDER, LYOPHILIZED, FOR SOLUTION INTRAVENOUS at 19:40

## 2017-01-15 RX ADMIN — ACETAMINOPHEN 650 MG: 325 TABLET ORAL at 15:05

## 2017-01-15 RX ADMIN — INSULIN ASPART 3 UNITS: 100 INJECTION, SOLUTION INTRAVENOUS; SUBCUTANEOUS at 16:43

## 2017-01-15 RX ADMIN — MUPIROCIN 1 APPLICATION: 20 OINTMENT TOPICAL at 09:16

## 2017-01-15 RX ADMIN — ASPIRIN 81 MG: 81 TABLET ORAL at 09:17

## 2017-01-15 RX ADMIN — METOPROLOL TARTRATE 25 MG: 25 TABLET ORAL at 09:25

## 2017-01-15 RX ADMIN — ACETAMINOPHEN 650 MG: 325 TABLET ORAL at 19:08

## 2017-01-15 RX ADMIN — CEFAZOLIN SODIUM 2 G: 2 INJECTION, SOLUTION INTRAVENOUS at 02:40

## 2017-01-15 RX ADMIN — DILTIAZEM HYDROCHLORIDE 15 MG/HR: 100 INJECTION, POWDER, LYOPHILIZED, FOR SOLUTION INTRAVENOUS at 21:40

## 2017-01-15 RX ADMIN — DILTIAZEM HYDROCHLORIDE 10 MG: 5 INJECTION INTRAVENOUS at 19:35

## 2017-01-15 RX ADMIN — CYCLOBENZAPRINE HYDROCHLORIDE 10 MG: 10 TABLET, FILM COATED ORAL at 02:26

## 2017-01-15 RX ADMIN — ACETAMINOPHEN 650 MG: 325 TABLET ORAL at 09:29

## 2017-01-15 RX ADMIN — DOCUSATE SODIUM -SENNOSIDES 2 TABLET: 50; 8.6 TABLET, COATED ORAL at 20:36

## 2017-01-15 RX ADMIN — SITAGLIPTIN 100 MG: 100 TABLET, FILM COATED ORAL at 11:29

## 2017-01-15 RX ADMIN — METOPROLOL TARTRATE 25 MG: 25 TABLET ORAL at 20:36

## 2017-01-15 RX ADMIN — INSULIN ASPART 3 UNITS: 100 INJECTION, SOLUTION INTRAVENOUS; SUBCUTANEOUS at 11:29

## 2017-01-15 RX ADMIN — ATORVASTATIN CALCIUM 40 MG: 40 TABLET, FILM COATED ORAL at 20:36

## 2017-01-15 RX ADMIN — OXYCODONE HYDROCHLORIDE 10 MG: 5 TABLET ORAL at 10:33

## 2017-01-15 RX ADMIN — GUAIFENESIN 1200 MG: 600 TABLET, EXTENDED RELEASE ORAL at 18:44

## 2017-01-15 RX ADMIN — PANTOPRAZOLE SODIUM 40 MG: 40 TABLET, DELAYED RELEASE ORAL at 07:00

## 2017-01-15 RX ADMIN — OXYCODONE HYDROCHLORIDE 10 MG: 5 TABLET ORAL at 06:29

## 2017-01-15 NOTE — PROGRESS NOTES
Acute Care - Physical Therapy Treatment Note  [facility]     Patient Name: Oswald Savage  : 1970  MRN: [id number]  Today's Date: 1/15/2017  Onset of Illness/Injury or Date of Surgery Date: 17          Admit Date: 2017    Visit Dx:    ICD-10-CM ICD-9-CM   1. Chest pain at rest R07.9 786.50   2. Angina at rest I20.8 413.9   3. Generalized weakness R53.1 780.79     Patient Active Problem List   Diagnosis   • Chest pain at rest               Adult Rehabilitation Note       01/15/17 0955          Rehab Assessment/Intervention    Discipline physical therapist  -      Document Type therapy note (daily note)  -      Subjective Information agree to therapy;complains of;pain  -      Patient Effort, Rehab Treatment good  -CH      Symptoms Noted During/After Treatment none  -CH      Precautions/Limitations cardiac precautions;fall precautions;sternal precautions  -CH      Recorded by [CH] Albina Bains, PT      Pain Assessment    Pain Assessment 0-10  -      Pain Score 4  -CH      Pain Location Chest  -      Pain Intervention(s) Repositioned  -CH      Recorded by [CH] Albina Bains, PT      Cognitive Assessment/Intervention    Current Cognitive/Communication Assessment functional  -      Orientation Status oriented x 4  -CH      Follows Commands/Answers Questions 100% of the time  -      Personal Safety WNL/WFL  -CH      Personal Safety Interventions fall prevention program maintained;gait belt;nonskid shoes/slippers when out of bed  -CH      Recorded by [CH] Albina Bains, PT      Bed Mobility, Assessment/Treatment    Bed Mob, Supine to Sit, [place] not tested  -      Bed Mob, Sit to Supine, [place] not tested  -CH      Recorded by [CH] Albina Bains, PT      Transfer Assessment/Treatment    Transfers, Sit-Stand [place] verbal cues required;nonverbal cues required (demo/gesture);2 person assist required;hand held assist;minimum assist (75% patient effort)   -CH      Transfers, Stand-Sit [place] verbal cues required;nonverbal cues required (demo/gesture);minimum assist (75% patient effort);2 person assist required;hand held assist  -CH      Recorded by [SILVANA] Albina Bains, PT      Gait Assessment/Treatment    Gait, [place] Level verbal cues required;nonverbal cues required (demo/gesture);contact guard assist;2 person assist required;hand held assist  -CH      Gait, Distance (Feet) 120  -CH      Gait, Gait Deviations brandy decreased;step length decreased;stride length decreased  -CH      Recorded by [] Albina Bains, PT      Therapy Exercises    Exercise Protocols --   5 reps cardiac protocol, level 3  -CH      Recorded by [] Albina Bains, PT      Positioning and Restraints    Pre-Treatment Position sitting in chair/recliner  -CH      Post Treatment Position chair  -CH      In Chair sitting;call light within reach;encouraged to call for assist;with family/caregiver  -CH      Recorded by [] Albina Bains PT        User Key  (r) = Recorded By, (t) = Taken By, (c) = Cosigned By    Initials Name Effective Dates    SILVANA Bains PT 12/01/15 -                 IP PT Goals       01/14/17 0913          Cardiopulmonary PT LTG    Cardiopulmonary PT LTG, Time to Achieve 1 wk  -      Cardiopulmonary PT LTG, Level Level V  -        User Key  (r) = Recorded By, (t) = Taken By, (c) = Cosigned By    Initials Name Provider Type    SILVANA Bains PT Physical Therapist          Physical Therapy Education     Title: PT OT SLP Therapies (Done)     Topic: Physical Therapy (Done)     Point: Mobility training (Done)    Learning Progress Summary    Learner Readiness Method Response Comment Documented by Status   Patient Acceptance E,TB,D VU,NR   01/15/17 1002 Done    Acceptance E,D,TB AYAD,NR   01/14/17 0912 Done               Point: Home exercise program (Done)    Learning Progress Summary    Learner Readiness Method Response Comment  Documented by Status   Patient Acceptance E,TB,D VU,NR   01/15/17 1002 Done               Point: Body mechanics (Done)    Learning Progress Summary    Learner Readiness Method Response Comment Documented by Status   Patient Acceptance E,TB,D VU,NR   01/15/17 1002 Done    Acceptance E,D,TB VU,NR   01/14/17 0912 Done               Point: Precautions (Done)    Learning Progress Summary    Learner Readiness Method Response Comment Documented by Status   Patient Acceptance E,TB,D VU,NR   01/15/17 1002 Done    Acceptance E,D,TB VU,NR   01/14/17 0912 Done                      User Key     Initials Effective Dates Name Provider Type Discipline     12/01/15 -  Albina Bains, PT Physical Therapist PT                    PT Recommendation and Plan  Anticipated Discharge Disposition: home with home health  Planned Therapy Interventions: balance training, bed mobility training, gait training, home exercise program, patient/family education, ROM (Range of Motion), stair training, transfer training  PT Frequency: daily  Plan of Care Review  Plan Of Care Reviewed With: patient  Progress: improving  Outcome Summary/Follow up Plan: Pt demonstrates increased activity tolerance and functional strength as he was able to increase his gait distance required less assistance.          Outcome Measures       01/15/17 1000 01/14/17 0900       How much help from another person do you currently need...    Turning from your back to your side while in flat bed without using bedrails? 3  -CH 3  -CH     Moving from lying on back to sitting on the side of a flat bed without bedrails? 3  -CH 3  -CH     Moving to and from a bed to a chair (including a wheelchair)? 3  -CH 3  -CH     Standing up from a chair using your arms (e.g., wheelchair, bedside chair)? 3  -CH 3  -CH     Climbing 3-5 steps with a railing? 2  -CH 2  -CH     To walk in hospital room? 3  -CH 3  -CH     AM-PAC 6 Clicks Score 17  -CH 17  -CH     Functional Assessment     Outcome Measure Options AM-PAC 6 Clicks Basic Mobility (PT)  - AM-PAC 6 Clicks Basic Mobility (PT)  -       User Key  (r) = Recorded By, (t) = Taken By, (c) = Cosigned By    Initials Name Provider Type    SILVANA Bains PT Physical Therapist           Time Calculation:         PT Charges       01/15/17 1004          Time Calculation    Start Time 0940  -      Stop Time 0955  -      Time Calculation (min) 15 min  -      PT Received On 01/15/17  -      PT - Next Appointment 01/16/17  -        User Key  (r) = Recorded By, (t) = Taken By, (c) = Cosigned By    Initials Name Provider Type    SILVANA Bains PT Physical Therapist          Therapy Charges for Today     Code Description Service Date Service Provider Modifiers Qty    87402678858 HC PT EVAL MOD COMPLEXITY 2 1/14/2017 Albina Bains, PT GP 1    37438225407 HC PT THER PROC EA 15 MIN 1/14/2017 Albina Bains, PT GP 1    51329924143 HC PT THER SUPP EA 15 MIN 1/14/2017 Albina Bains, PT GP 1    88310803394 HC PT THER PROC EA 15 MIN 1/15/2017 Albina Bains, PT GP 1    04242990918 HC PT THER SUPP EA 15 MIN 1/15/2017 Albina Bains, PT GP 1          PT G-Codes  Outcome Measure Options: AM-PAC 6 Clicks Basic Mobility (PT)    Albina Bains, PT  1/15/2017

## 2017-01-15 NOTE — PROGRESS NOTES
He is doing well.  DC tubes and lines.  He has quite a bit of snoring and obstructive sleep apnea so I'll have pulmonary see and hopefully set up for a sleep study postoperatively.

## 2017-01-15 NOTE — CONSULTS
[facility]  Pulmonary Consultation     Patient Name: Oswald Savage  Age/Sex: 46 y.o. male  : 1970  MRN: [id number]    Date of Admission: 2017  Date of Encounter Visit: 01/15/17  Encounter Provider: Jun Crews MD  Referring Provider: Kaiser Abdi MD  Place of Service: [facility] CARDIOLOGY  Patient Care Team:  Rose Marie Farr MD as PCP - General (Internal Medicine)      Subjective:     Consulted for: Suspected obstructive sleep apnea    Chief Complaint: Patient came in with chest pain that turned out to be cardiac and eventually had 7 vessel bypass, denies any specific sleep complaints    History of Present Illness:  Oswald Savage is a 46 y.o. male with history of coronary artery disease status post angioplasty in  when he was in [place].  The patient is a social smoker at this point with the Rocaltrol which is more of a cultural thing at this point according to the patient and he has history of diabetes and hypertension.  Patient recovered very well post operatively and he is supposed to be discharged home.  He was still on oxygen however his oxygen sats on room air was up to 97% and oxygen will be discontinued.  The patient was noted to have loud snoring, he denies any daytime hypersomnia, he does have some non-refreshing sleep and some awakenings was no difficulty going back to sleep.  He denies any morning headache.  He does have some occasional sore throat but this is more of a postop problem.  No previous workup for sleep apnea but he does have family history of that.  No Other symptoms suggest narcolepsy, no history of any trauma or seizure disorder.  No parasomnias.    Review of Systems:   Review of Systems   Pertinent finding on 12 point system review were relevant for chest pain substernal on the night prior to admission associated with shortness of breath and diaphoresis similar to the pain he had on his previous MI,  positive snoring with occasional witnessed apnea, no significant daytime hypersomnia, no polyphagia or polydipsia, no diarrhea or abdominal pain or swelling.  He did have postoperative issues was pain that were all within the expected range and there were addressed and doing better already otherwise negative systems review please refer to admission note for more details  Past Medical History:  Past Medical History   Diagnosis Date   • CAD (coronary artery disease)    • Diabetes mellitus    • GERD (gastroesophageal reflux disease)    • Heart attack 2007     STENTS X 2 PLACED IN [place] OF [place]   • HLD (hyperlipidemia)    • Hypertension    • Smoker        Past Surgical History   Procedure Laterality Date   • Coronary angioplasty with stent placement  2007   • Cardiac catheterization N/A 1/12/2017     Procedure: Left ventriculography;  Surgeon: Diandra Suarez MD;  Location:  [facility];  Service:    • Cardiac catheterization N/A 1/12/2017     Procedure: Left Heart Cath;  Surgeon: Diandra Suarez MD;  Location:  [facility];  Service:    • Cardiac catheterization N/A 1/12/2017     Procedure: Coronary angiography;  Surgeon: Diandra Suarez MD;  Location:  [facility];  Service:    • Cardiac catheterization  1/12/2017     Procedure: Functional Flow [place];  Surgeon: Diandra Suarez MD;  Location:  [facility];  Service:    • Coronary artery bypass graft  01/13/2017     X7       Home Medications:   Prescriptions Prior to Admission   Medication Sig Dispense Refill Last Dose   • aspirin 325 MG tablet Take 325 mg by mouth Daily.      • atorvastatin (LIPITOR) 20 MG tablet Take 20 mg by mouth Daily.      • glimepiride (AMARYL) 2 MG tablet Take 2 mg by mouth 2 (Two) Times a Day.      • lisinopril (PRINIVIL,ZESTRIL) 20 MG tablet Take 20 mg by mouth Daily.      • raNITIdine (ZANTAC) 150 MG tablet Take 150 mg by mouth 2 (Two) Times a Day.      •  sitaGLIPtin-metFORMIN (UMET)  MG per tablet Take 1 tablet by mouth 2 (Two) Times a Day With Meals.          Inpatient Medications:  Scheduled Meds:  aspirin 81 mg Oral Daily   atorvastatin 40 mg Oral Nightly   enoxaparin 40 mg Subcutaneous Q24H   guaiFENesin 1,200 mg Oral BID   insulin aspart 2-5 Units Subcutaneous 4x Daily AC & at Bedtime   metoprolol tartrate 25 mg Oral Q12H   mupirocin 1 application Each Nare Daily   pantoprazole 40 mg Oral Q AM   sennosides-docusate sodium 2 tablet Oral Nightly   SITagliptin 100 mg Oral Daily     Continuous Infusions:  niCARdipine 5-15 mg/hr Last Rate: Stopped (17 0755)     PRN Meds:.•  acetaminophen  •  ALPRAZolam  •  bisacodyl  •  bisacodyl  •  cyclobenzaprine  •  dextrose  •  dextrose  •  glucagon (human recombinant)  •  HYDROcodone-acetaminophen  •  influenza vaccine  •  magnesium hydroxide  •  Morphine **AND** naloxone  •  [] Morphine **AND** naloxone  •  niCARdipine  •  ondansetron **OR** ondansetron ODT **OR** ondansetron  •  ondansetron  •  oxyCODONE  •  potassium chloride **OR** potassium chloride  •  potassium chloride **OR** potassium chloride **OR** potassium chloride  •  potassium chloride **OR** potassium chloride  •  [DISCONTINUED] promethazine **OR** promethazine  •  sodium chloride  •  temazepam    Allergies:  No Known Allergies    Past Social History:  Social History     Social History   • Marital status:      Spouse name: N/A   • Number of children: N/A   • Years of education: N/A     Social History Main Topics   • Smoking status: Current Every Day Smoker     Packs/day: 1.00     Types: Cigarettes   • Smokeless tobacco: None   • Alcohol use No   • Drug use: No   • Sexual activity: Not Asked     Other Topics Concern   • None     Social History Narrative       Past Family History:  History reviewed. No pertinent family history.        Objective:   Temp:  [98.3 °F (36.8 °C)-99.5 °F (37.5 °C)] 99.5 °F (37.5 °C)  Heart Rate:  [67-78]  78  Resp:  [12-16] 15  BP: (142-160)/(75-82) 160/80  SpO2:  [96 %-100 %] 96 %  on  Flow (L/min):  [2-4] 2 O2 Device: nasal cannula     Intake/Output Summary (Last 24 hours) at 01/15/17 1422  Last data filed at 01/15/17 1300   Gross per 24 hour   Intake    240 ml   Output   1085 ml   Net   -845 ml     Body mass index is 30.95 kg/(m^2).  Last 3 weights    01/13/17  1344 01/13/17  1930 01/15/17  0646   Weight: 234 lb (106 kg) 234 lb (106 kg) 234 lb 9.6 oz (106 kg)     Weight change: 9.6 oz (0.272 kg)    Physical Exam:   Physical Exam   General:    No acute distress, alert and oriented x4, pleasant                   Head:    Normocephalic, atraumatic.   Eyes:          Conjunctivae and sclerae normal, no icterus, PERRLA   Throat:   No oral lesions, no thrush, oral mucosa moist Mallampati 4 airways,     Neck:   Supple, trachea midline, site of previous central line is dressed with no evidence of any bleeding or hematoma    Lungs:     Clear to auscultation bilaterally , Decreased posteriorly no crackles    Heart:    Regular rhythm and normal rate.  No murmurs, gallops, or rubs noted.   Abdomen:     Soft, non-tender, non-distended, positive bowel sounds.    Extremities:   No clubbing, cyanosis, or edema.     Pulses:   Pulses palpable and equal bilaterally.    Skin:   No bleeding or rash.   Neuro:   Non-focal.  Moves all extremities well.    Psychiatric:   Normal mood and affect.     Lab Review:     Results from last 7 days  Lab Units 01/15/17  0341 01/14/17  0329 01/13/17  1944 01/13/17  1529 01/13/17  1200 01/13/17  0349 01/12/17  1953 01/12/17  0110   SODIUM mmol/L 138 145  --  145 143 139 142 140   POTASSIUM mmol/L 4.4 4.3 4.8 4.3 4.0 4.2 4.1 4.0   CHLORIDE mmol/L 98 103  --  107 104 101 101 100   TOTAL CO2 mmol/L 24.3 26.3  --  23.6 25.7 24.3 26.1 26.3   BUN mg/dL 11 12  --  11 11 11 10 16   CREATININE mg/dL 0.84 1.02  --  1.07 1.24 0.93 0.86 1.08   GLUCOSE mg/dL 185* 132*  --  102* 132* 294* 167* 234*   CALCIUM mg/dL  9.4 9.1  --  9.1 9.3 9.0 9.5 9.6   AST (SGOT) U/L  --   --   --   --   --   --  32 31   ALT (SGPT) U/L  --   --   --   --   --   --  51* 48*       Results from last 7 days  Lab Units 01/12/17  0659 01/12/17  0110   TROPONIN T ng/mL <0.010 <0.010       Results from last 7 days  Lab Units 01/15/17  0341 01/14/17  0329 01/13/17  1529 01/13/17  1201 01/13/17  1059 01/13/17  1034 01/13/17  1003  01/12/17 1953 01/12/17  0110   WBC 10*3/mm3 12.74* 11.02* 10.80* 8.75  --   --   --   --  7.22 8.05   HEMOGLOBIN g/dL 14.0 13.4* 14.3 14.6  --   --   --   --  15.7 15.7   HEMOGLOBIN, ARTERIAL POC g/dL  --   --   --   --  11.6* 11.6* 11.9*  < >  --   --    HEMATOCRIT % 40.8 40.1* 39.8* 41.2  --   --   --   --  45.5 43.6   HEMATOCRIT POC %  --   --   --   --  34* 34* 35*  < >  --   --    PLATELETS 10*3/mm3 142 128* 117* 113*  --   --   --   --  196 200   < > = values in this interval not displayed.    Results from last 7 days  Lab Units 01/14/17  0329 01/13/17  1201   INR  1.26* 1.25*   APTT seconds  --  25.7       Results from last 7 days  Lab Units 01/14/17  0329 01/13/17  1529 01/13/17  1200   MAGNESIUM mg/dL 2.4 2.4 2.9*       Results from last 7 days  Lab Units 01/12/17  1953   CHOLESTEROL mg/dL 144   TRIGLYCERIDES mg/dL 165*   HDL CHOL mg/dL 29*   LDL CHOL mg/dL 82               Results from last 7 days  Lab Units 01/13/17  1747   PH, ARTERIAL pH units 7.330*   PO2 ART mm Hg 112.9*   PCO2, ARTERIAL mm Hg 49.2*   HCO3 ART mmol/L 26.0                       Results from last 7 days  Lab Units 01/12/17  2110   NITRITE UA  Negative                 Imaging:   XR Chest 1 View [[id number]] Collected:  01/15/17 1347     Updated:  01/15/17 1347    Narrative:       AP CHEST 01/15/2017     HISTORY: Chest tube removal.     FINDINGS: There is no evidence of pneumothorax following the bilateral  chest tube removal. There are some patchy areas of mild probable  atelectasis in both lung bases. Heart size is mildly enlarged.  Sternotomy wires  are seen. Lungs are underinflated.       Impression:       No evidence of pneumothorax following the chest tube  removal.  Minimal residual atelectasis of the left base, no obvious air bronchogram or pneumonia          I personally viewed and interpreted the patient's imaging studies.    Assessment:     1. Coronary artery disease status post CABGx7 on this admission and previous history of angioplasty in 2007  2. Snoring was witnessed apnea at night and strong suspicion for obstructive sleep apnea  3. No significant daytime fatigue or hypersomnia however patient has obesity and narrow airway on physical exam  4. Obesity  5. Diabetes mellitus  6. Systemic hypertension    Plan:   Patient was provided about obstructive sleep apnea and its cardiovascular consequences, we discussed the diagnostic tools including the polysomnography and treatment modalities included the CPAP, we also discussed the screening option with an overnight oximetry that can be done while inpatient.  Patient verbalized understanding the need to pursue further evaluation and the importance of the treatment in that case and he is agreeable to proceed, we will do the overnight oximetry on room air tonight and consider the sleep study after discharge accordingly.  Questions were answered to his satisfaction  We'll continue to follow      Thank you for allowing me to participate in the care of Oswald Savage. Feel free to contact me directly with any further questions or concerns.    Jun Crews MD  [facility]   01/15/17  2:22 PM    EMR Dragon/Transcription disclaimer:   Much of this encounter note is an electronic transcription/translation of spoken language to printed text. The electronic translation of spoken language may permit erroneous, or at times, nonsensical words or phrases to be inadvertently transcribed; Although I have reviewed the note for such errors, some may still exist.

## 2017-01-15 NOTE — PLAN OF CARE
Problem: Patient Care Overview (Adult)  Goal: Plan of Care Review  Outcome: Ongoing (interventions implemented as appropriate)    01/15/17 1002   Coping/Psychosocial Response Interventions   Plan Of Care Reviewed With patient   Patient Care Overview   Progress improving   Outcome Evaluation   Outcome Summary/Follow up Plan Pt demonstrates increased activity tolerance and functional strength as he was able to increase his gait distance required less assistance.

## 2017-01-15 NOTE — PROGRESS NOTES
46 y.o.   LOS: 2 days   Patient Care Team:  Rose Marie Farr MD as PCP - General (Internal Medicine)    Chief Complaint:  Uncontrolled T2DM and post op management    Chief Complaint   Patient presents with   • Chest Pain     MIDSTERNAL CHEST BURNING, NO RADIATION. PT REPORTS MILD SOA. FELT GENERALIZED WEAKNESS AT ONSET. ONSET APROX 2350       Subjective     HPI  Feeling better  Still on clear liquids  May increase to regular diet today  Mild chest pain    Interval History:    Patient is a 46-year-old male from [place] with a past medical history of uncontrolled type 2 diabetes mellitus, hypertension, hyperlipidemia, tobacco abuse, coronary artery disease with prior angioplasty in 2008 admitted to the hospital with chest pain and further evaluation revealed that he needs coronary artery bypass surgery.  Patient underwent surgery yesterday and was on insulin drip until this morning when it was discontinued.  Patient is currently extubated and is not on IV insulin  He is not eating yet is very groggy and has received pain medication.  Most of the history was obtained from chart review.  As per the nurse patient will advance to maybe clear liquids this evening     Patient is very groggy and could not provide much information  Home med list reviewed and he is on Janumet and Amaryl at home.  Compliance is under question  It is also unclear if the patient has any diabetes related complications  Review of Systems:      Review of Systems   Constitutional: Positive for fatigue.   Respiratory: Negative for shortness of breath.    Cardiovascular: Negative for chest pain.   Gastrointestinal: Negative for abdominal distention and nausea.   All other systems reviewed and are negative.    Objective     Vital Signs   Temp:  [98.3 °F (36.8 °C)-98.8 °F (37.1 °C)] 98.6 °F (37 °C)  Heart Rate:  [56-74] 74  Resp:  [12-16] 14  BP: ()/(65-82) 156/77    Physical Exam:  Physical Exam   Constitutional: He is oriented to person, place,  and time.   HENT:   Head: Normocephalic and atraumatic.   Eyes: EOM are normal. Pupils are equal, round, and reactive to light.   Neck: Normal range of motion. Neck supple. No thyromegaly present.   Cardiovascular: Normal rate, regular rhythm, normal heart sounds and intact distal pulses.    Pulmonary/Chest: Effort normal and breath sounds normal.   Abdominal: Soft. Bowel sounds are normal. He exhibits distension. There is no tenderness.   Musculoskeletal: Normal range of motion. He exhibits no edema.   Neurological: He is alert and oriented to person, place, and time.   Skin: Skin is warm and dry.   Psychiatric: He has a normal mood and affect. His behavior is normal.   Nursing note and vitals reviewed.  Results Review:     I reviewed the patient's new clinical results.      GLUCOSE   Date/Time Value Ref Range Status   01/15/2017 0341 185 (H) 65 - 99 mg/dL Final   01/14/2017 0329 132 (H) 65 - 99 mg/dL Final   01/13/2017 1529 102 (H) 65 - 99 mg/dL Final   01/13/2017 1200 132 (H) 65 - 99 mg/dL Final   01/13/2017 0349 294 (H) 65 - 99 mg/dL Final   01/12/2017 1953 167 (H) 65 - 99 mg/dL Final     Lab Results (last 72 hours)     Procedure Component Value Units Date/Time    Platelet Function ADP [[id number]] Collected:  01/12/17 1953    Specimen:  Blood Updated:  01/12/17 2009     ADP Aggregation, % Platelet 74 %     CBC & Differential [[id number]] Collected:  01/12/17 1953    Specimen:  Blood Updated:  01/12/17 2014    Narrative:       The following orders were created for panel order CBC & Differential.  Procedure                               Abnormality         Status                     ---------                               -----------         ------                     CBC Auto Differential[[id number]]         Abnormal            Final result                 Please view results for these tests on the individual orders.    CBC Auto Differential [[id number]]  (Abnormal) Collected:  01/12/17 1953    Specimen:  Blood Updated:   01/12/17 2014     WBC 7.22 10*3/mm3      RBC 4.58 (L) 10*6/mm3      Hemoglobin 15.7 g/dL      Hematocrit 45.5 %      MCV 99.3 (H) fL      MCH 34.3 (H) pg      MCHC 34.5 g/dL      RDW 11.6 %      RDW-SD 41.7 fl      MPV 10.3 fL      Platelets 196 10*3/mm3      Neutrophil % 47.1 %      Lymphocyte % 42.5 %      Monocyte % 6.5 %      Eosinophil % 3.2 %      Basophil % 0.4 %      Immature Grans % 0.3 %      Neutrophils, Absolute 3.40 10*3/mm3      Lymphocytes, Absolute 3.07 10*3/mm3      Monocytes, Absolute 0.47 10*3/mm3      Eosinophils, Absolute 0.23 10*3/mm3      Basophils, Absolute 0.03 10*3/mm3      Immature Grans, Absolute 0.02 10*3/mm3     Hemoglobin A1c [[id number]]  (Abnormal) Collected:  01/12/17 1953    Specimen:  Blood Updated:  01/12/17 2021     Hemoglobin A1C 7.20 (H) %     Narrative:       Hemoglobin A1C Ranges:    Increased Risk for Diabetes  5.7% to 6.4%  Diabetes                     >= 6.5%  Diabetic Goal                < 7.0%    Comprehensive Metabolic Panel [[id number]]  (Abnormal) Collected:  01/12/17 1953    Specimen:  Blood Updated:  01/12/17 2034     Glucose 167 (H) mg/dL      BUN 10 mg/dL      Creatinine 0.86 mg/dL      Sodium 142 mmol/L      Potassium 4.1 mmol/L      Chloride 101 mmol/L      CO2 26.1 mmol/L      Calcium 9.5 mg/dL      Total Protein 7.1 g/dL      Albumin 4.40 g/dL      ALT (SGPT) 51 (H) U/L      AST (SGOT) 32 U/L      Alkaline Phosphatase 73 U/L      Total Bilirubin 0.4 mg/dL      eGFR Non African Amer 95 mL/min/1.73      Globulin 2.7 gm/dL      A/G Ratio 1.6 g/dL      BUN/Creatinine Ratio 11.6      Anion Gap 14.9 mmol/L     Lipid Panel [[id number]]  (Abnormal) Collected:  01/12/17 1953    Specimen:  Blood Updated:  01/12/17 2034     Total Cholesterol 144 mg/dL      Triglycerides 165 (H) mg/dL      HDL Cholesterol 29 (L) mg/dL      LDL Cholesterol  82 mg/dL      VLDL Cholesterol 33 mg/dL      LDL/HDL Ratio 2.83     Narrative:       Cholesterol Reference Ranges  (U.S. Department of  Health and Human Services ATP III Classifications)    Desirable          <200 mg/dL  Borderline High    200-239 mg/dL  High Risk          >240 mg/dL      Triglyceride Reference Ranges  (U.S. Department of Health and Human Services ATP III Classifications)    Normal           <150 mg/dL  Borderline High  150-199 mg/dL  High             200-499 mg/dL  Very High        >500 mg/dL    HDL Reference Ranges  (U.S. Department of Health and Human Services ATP III Classifcations)    Low     <40 mg/dl (major risk factor for CHD)  High    >60 mg/dl ('negative' risk factor for CHD)        LDL Reference Ranges  (U.S. Department of Health and Human Services ATP III Classifcations)    Optimal          <100 mg/dL  Near Optimal     100-129 mg/dL  Borderline High  130-159 mg/dL  High             160-189 mg/dL  Very High        >189 mg/dL    Urinalysis With / Culture If Indicated [[id number]]  (Normal) Collected:  01/12/17 2110    Specimen:  Urine from Urine, Clean Catch Updated:  01/12/17 2122     Color, UA Yellow      Appearance, UA Clear      pH, UA 7.0      Specific Gravity, UA 1.021      Glucose, UA Negative      Ketones, UA Negative      Bilirubin, UA Negative      Blood, UA Negative      Protein, UA Negative      Leuk Esterase, UA Negative      Nitrite, UA Negative      Urobilinogen, UA 1.0 E.U./dL     Narrative:       Urine microscopic not indicated.    Blood Gas, Arterial [[id number]]  (Abnormal) Collected:  01/13/17 0414    Specimen:  Arterial Blood Updated:  01/13/17 0416     Site Arterial: right brachial      Sameer's Test N/A      pH, Arterial 7.407 pH units      pCO2, Arterial 43.0 mm Hg      pO2, Arterial 77.3 (L) mm Hg      HCO3, Arterial 27.1 mmol/L      Base Excess, Arterial 1.9 mmol/L      O2 Saturation Calculated 95.3 %      Barometric Pressure for Blood Gas 763.1 mmHg      Modality Room air      Set [facility] Resp Rate 20      Rate 20 Breaths/minute     Narrative:       sat 96% Meter: [id number] : [id number]  Kenny Ohara    Basic Metabolic Panel [[id number]]  (Abnormal) Collected:  01/13/17 0349    Specimen:  Blood Updated:  01/13/17 0546     Glucose 294 (H) mg/dL      BUN 11 mg/dL      Creatinine 0.93 mg/dL      Sodium 139 mmol/L      Potassium 4.2 mmol/L      Chloride 101 mmol/L      CO2 24.3 mmol/L      Calcium 9.0 mg/dL      eGFR Non African Amer 87 mL/min/1.73      BUN/Creatinine Ratio 11.8      Anion Gap 13.7 mmol/L     Narrative:       GFR Normal >60  Chronic Kidney Disease <60  Kidney Failure <15    POC Activated Clotting Time [[id number]]  (Abnormal) Collected:  01/12/17 0920    Specimen:  Blood Updated:  01/13/17 0706     Activated Clotting Time  250 (H) Seconds       Serial Number: [id number]    : [id number]       POC Activated Clotting Time [[id number]]  (Normal) Collected:  01/13/17 0710    Specimen:  Blood Updated:  01/13/17 1140     Activated Clotting Time  131 Seconds       Serial Number: [id number]    : [id number]       POC OR Panel, ISTAT [[id number]]  (Abnormal) Collected:  01/13/17 0710    Specimen:  Blood Updated:  01/13/17 1140     Potassium, Arterial 4.1 mmol/L      Total CO2 27 mmol/L       Serial Number: [id number]    : 3361        Hemoglobin 14.3 g/dL      Hematocrit 42 %      pH, Arterial 7.35 pH units      HCO3, Arterial 25.3 mmol/L      Base Excess 0.0000 mmol/L      O2 Saturation, Arterial 97 %      pO2, Arterial 92 mmHg      pCO2, Arterial 45.7 (H) mm Hg      Glucose 209 (H) mg/dL     POC Activated Clotting Time [[id number]]  (Abnormal) Collected:  01/13/17 0936    Specimen:  Blood Updated:  01/13/17 1141     Activated Clotting Time  358 (H) Seconds       Serial Number: [id number]    : [id number]       POC OR Panel, ISTAT [[id number]]  (Abnormal) Collected:  01/13/17 0936    Specimen:  Blood Updated:  01/13/17 1141     Potassium, Arterial 4.3 mmol/L      Total CO2 34 (H) mmol/L       Serial Number: [id number]    : 3361        Hemoglobin 11.2 (L) g/dL      Hematocrit 33 (L) %      pH, Arterial  7.33 (L) pH units      HCO3, Arterial 31.7 (H) mmol/L      Base Excess 6.0000 (H) mmol/L      O2 Saturation, Arterial 100 (H) %      pO2, Arterial 605 (H) mmHg      pCO2, Arterial 60.4 (H) mm Hg      Glucose 169 (H) mg/dL     POC OR Panel, ISTAT [[id number]]  (Abnormal) Collected:  01/13/17 0943    Specimen:  Blood Updated:  01/13/17 1142     Potassium, Arterial 4.7 mmol/L      Total CO2 30 (H) mmol/L       Serial Number: [id number]    : 3361        Hemoglobin 11.6 (L) g/dL      Hematocrit 34 (L) %      pH, Arterial 7.30 (L) pH units      HCO3, Arterial 28.0 (H) mmol/L      Base Excess 2.0000 mmol/L      O2 Saturation, Arterial 82 (L) %      pO2, Arterial 53 (L) mmHg      pCO2, Arterial 56.6 (H) mm Hg      Glucose 166 (H) mg/dL     POC Activated Clotting Time [[id number]]  (Abnormal) Collected:  01/13/17 1003    Specimen:  Blood Updated:  01/13/17 1142     Activated Clotting Time  353 (H) Seconds       Serial Number: [id number]    : 3361       POC OR Panel, ISTAT [[id number]]  (Abnormal) Collected:  01/13/17 1003    Specimen:  Blood Updated:  01/13/17 1142     Potassium, Arterial 5.6 (H) mmol/L      Total CO2 31 (H) mmol/L       Serial Number: [id number]    : 3361        Hemoglobin 11.9 (L) g/dL      Hematocrit 35 (L) %      pH, Arterial 7.38 pH units      HCO3, Arterial 29.9 (H) mmol/L      Base Excess 5.0000 mmol/L      O2 Saturation, Arterial 100 (H) %      pO2, Arterial 423 (H) mmHg      pCO2, Arterial 50.7 (H) mm Hg      Glucose 173 (H) mg/dL     POC Activated Clotting Time [[id number]]  (Abnormal) Collected:  01/13/17 1033    Specimen:  Blood Updated:  01/13/17 1143     Activated Clotting Time  332 (H) Seconds       Serial Number: [id number]    : 3361       POC OR Panel, ISTAT [[id number]]  (Abnormal) Collected:  01/13/17 1034    Specimen:  Blood Updated:  01/13/17 1143     Potassium, Arterial 5.6 (H) mmol/L      Total CO2 30 (H) mmol/L       Serial Number: [id number]    : 3361        Hemoglobin 11.6  (L) g/dL      Hematocrit 34 (L) %      pH, Arterial 7.37 pH units      HCO3, Arterial 28.8 (H) mmol/L      Base Excess 3.0000 mmol/L      O2 Saturation, Arterial 100 (H) %      pO2, Arterial 400 (H) mmHg      pCO2, Arterial 50.4 (H) mm Hg      Glucose 177 (H) mg/dL     POC Activated Clotting Time [[id number]]  (Normal) Collected:  01/13/17 1059    Specimen:  Blood Updated:  01/13/17 1143     Activated Clotting Time  100 Seconds       Serial Number: [id number]    : 3361       POC OR Panel, ISTAT [[id number]]  (Abnormal) Collected:  01/13/17 1059    Specimen:  Blood Updated:  01/13/17 1143     Potassium, Arterial 5.0 (H) mmol/L      Total CO2 27 mmol/L       Serial Number: [id number]    : 3361        Hemoglobin 11.6 (L) g/dL      Hematocrit 34 (L) %      pH, Arterial 7.34 (L) pH units      HCO3, Arterial 25.7 mmol/L      Base Excess 0.0000 mmol/L      O2 Saturation, Arterial 99 (H) %      pO2, Arterial 152 (H) mmHg      pCO2, Arterial 47.6 (H) mm Hg      Glucose 164 (H) mg/dL     CBC & Differential [[id number]] Collected:  01/13/17 1201    Specimen:  Blood Updated:  01/13/17 1221    Narrative:       The following orders were created for panel order CBC & Differential.  Procedure                               Abnormality         Status                     ---------                               -----------         ------                     CBC Auto Differential[[id number]]         Abnormal            Final result                 Please view results for these tests on the individual orders.    CBC Auto Differential [[id number]]  (Abnormal) Collected:  01/13/17 1201    Specimen:  Blood Updated:  01/13/17 1221     WBC 8.75 10*3/mm3      RBC 4.24 (L) 10*6/mm3      Hemoglobin 14.6 g/dL      Hematocrit 41.2 %      MCV 97.2 (H) fL      MCH 34.4 (H) pg      MCHC 35.4 g/dL      RDW 11.7 %      RDW-SD 41.4 fl      MPV 10.4 fL      Platelets 113 (L) 10*3/mm3      Neutrophil % 75.2 %      Lymphocyte % 19.8 %      Monocyte % 3.2 (L)  %      Eosinophil % 1.5 %      Basophil % 0.1 %      Immature Grans % 0.2 %      Neutrophils, Absolute 6.58 10*3/mm3      Lymphocytes, Absolute 1.73 10*3/mm3      Monocytes, Absolute 0.28 10*3/mm3      Eosinophils, Absolute 0.13 10*3/mm3      Basophils, Absolute 0.01 10*3/mm3      Immature Grans, Absolute 0.02 10*3/mm3     POC Glucose Fingerstick [[id number]]  (Normal) Collected:  01/13/17 1202    Specimen:  Blood Updated:  01/13/17 1224     Glucose 130 mg/dL     Narrative:       Meter: [id number] : [id number] Viglione Kari    Blood Gas, Arterial [[id number]]  (Abnormal) Collected:  01/13/17 1227    Specimen:  Arterial Blood Updated:  01/13/17 1229     Site Arterial Line      Sameer's Test N/A      pH, Arterial 7.357 pH units      pCO2, Arterial 44.7 mm Hg      pO2, Arterial 253.3 (H) mm Hg      HCO3, Arterial 25.0 mmol/L      Base Excess, Arterial -0.7 (L) mmol/L      O2 Saturation Calculated 99.8 (H) %      A-a Gradiant 0.4 mmHg      Barometric Pressure for Blood Gas 765.7 mmHg      Modality Adult Vent      FIO2 100 %      Ventilator Mode VC      Set Tidal Volume 800      Set Mech Resp Rate 12      Rate 12 Breaths/minute      PEEP 7.5     Narrative:       Meter: [id number] : [id number] Graf Stapleton    Calcium, Ionized [[id number]]  (Normal) Collected:  01/13/17 1201    Specimen:  Blood Updated:  01/13/17 1246     Ionized Calcium 1.25 mmol/L      Ionized Calcium 5.0 mg/dL     Magnesium [[id number]]  (Abnormal) Collected:  01/13/17 1200    Specimen:  Blood Updated:  01/13/17 1247     Magnesium 2.9 (H) mg/dL     Renal Function Panel [[id number]]  (Abnormal) Collected:  01/13/17 1200    Specimen:  Blood Updated:  01/13/17 1254     Glucose 132 (H) mg/dL      BUN 11 mg/dL      Creatinine 1.24 mg/dL      Sodium 143 mmol/L      Potassium 4.0 mmol/L      Chloride 104 mmol/L      CO2 25.7 mmol/L      Calcium 9.3 mg/dL      Albumin 4.40 g/dL      Phosphorus 3.1 mg/dL      Anion Gap 13.3 mmol/L      BUN/Creatinine Ratio 8.9       eGFR Non African Amer 62 mL/min/1.73     Protime-INR [[id number]]  (Abnormal) Collected:  01/13/17 1201    Specimen:  Blood Updated:  01/13/17 1258     Protime 15.2 (H) Seconds      INR 1.25 (H)     aPTT [[id number]]  (Normal) Collected:  01/13/17 1201    Specimen:  Blood Updated:  01/13/17 1258     PTT 25.7 seconds     POC Glucose Fingerstick [[id number]]  (Normal) Collected:  01/13/17 1329    Specimen:  Blood Updated:  01/13/17 1332     Glucose 96 mg/dL     Narrative:       Meter: [id number] : [id number] Keaton Enriquez    POC Glucose Fingerstick [[id number]]  (Normal) Collected:  01/13/17 1449    Specimen:  Blood Updated:  01/13/17 1450     Glucose 90 mg/dL     Narrative:       Meter: [id number] : [id number] Keaton Enriquez    Blood Gas, Arterial [[id number]]  (Abnormal) Collected:  01/13/17 1528    Specimen:  Arterial Blood Updated:  01/13/17 1530     Site Arterial Line      Sameer's Test N/A      pH, Arterial 7.403 pH units      pCO2, Arterial 41.9 mm Hg      pO2, Arterial 116.7 (H) mm Hg      HCO3, Arterial 26.1 mmol/L      Base Excess, Arterial 1.1 mmol/L      O2 Saturation Calculated 98.6 %      A-a Gradiant 0.5 mmHg      Barometric Pressure for Blood Gas 765.5 mmHg      Modality Adult Vent      FIO2 40 %      Ventilator Mode VC      Set Tidal Volume 800      Set Mech Resp Rate 14      Rate 14 Breaths/minute      PEEP 7.5     Narrative:       Meter: [id number] : [id number] Graf Stapleton    CBC (No Diff) [[id number]]  (Abnormal) Collected:  01/13/17 1529    Specimen:  Blood Updated:  01/13/17 1544     WBC 10.80 (H) 10*3/mm3      RBC 4.09 (L) 10*6/mm3      Hemoglobin 14.3 g/dL      Hematocrit 39.8 (L) %      MCV 97.3 (H) fL      MCH 35.0 (H) pg      MCHC 35.9 g/dL      RDW 11.8 %      RDW-SD 42.0 fl      MPV 10.4 fL      Platelets 117 (L) 10*3/mm3     Renal Function Panel [[id number]]  (Abnormal) Collected:  01/13/17 1529    Specimen:  Blood Updated:  01/13/17 1602     Glucose 102 (H) mg/dL      BUN 11 mg/dL       Creatinine 1.07 mg/dL      Sodium 145 mmol/L      Potassium 4.3 mmol/L      Chloride 107 mmol/L      CO2 23.6 mmol/L      Calcium 9.1 mg/dL      Albumin 4.60 g/dL      Phosphorus 3.6 mg/dL      Anion Gap 14.4 mmol/L      BUN/Creatinine Ratio 10.3      eGFR Non African Amer 74 mL/min/1.73     Magnesium [[id number]]  (Normal) Collected:  01/13/17 1529    Specimen:  Blood Updated:  01/13/17 1602     Magnesium 2.4 mg/dL     POC Glucose Fingerstick [[id number]]  (Abnormal) Collected:  01/13/17 1708    Specimen:  Blood Updated:  01/13/17 1728     Glucose 175 (H) mg/dL     Narrative:       Meter: [id number] : [id number] Keaton Enriquez    Blood Gas, Arterial [[id number]]  (Abnormal) Collected:  01/13/17 1747    Specimen:  Arterial Blood Updated:  01/13/17 1804     Site Arterial Line      Sameer's Test N/A      pH, Arterial 7.330 (L) pH units      pCO2, Arterial 49.2 (H) mm Hg      pO2, Arterial 112.9 (H) mm Hg      HCO3, Arterial 26.0 mmol/L      Base Excess, Arterial -0.6 (L) mmol/L      O2 Saturation Calculated 98.0 %      A-a Gradiant 0.5 mmHg      Barometric Pressure for Blood Gas 766.2 mmHg      Modality Adult Vent      FIO2 40 %      Ventilator Mode PS      Set Tidal Volume 700      Set Mech Resp Rate 14      PEEP 7.5      PSV 8 cmH2O     Narrative:       Meter: [id number] : [id number] Graf Stapleton    POC Glucose Fingerstick [[id number]]  (Abnormal) Collected:  01/13/17 1840    Specimen:  Blood Updated:  01/13/17 1844     Glucose 152 (H) mg/dL     Narrative:       Meter: [id number] : [id number] Keaton Enriquez    POC Glucose Fingerstick [[id number]]  (Abnormal) Collected:  01/13/17 1942    Specimen:  Blood Updated:  01/13/17 1943     Glucose 157 (H) mg/dL     Narrative:       Meter: [id number] : [id number] Juan Ceja    Potassium [[id number]]  (Normal) Collected:  01/13/17 1944    Specimen:  Blood Updated:  01/13/17 2016     Potassium 4.8 mmol/L     POC Glucose Fingerstick [[id number]]  (Abnormal)  Collected:  01/13/17 2019    Specimen:  Blood Updated:  01/13/17 2021     Glucose 150 (H) mg/dL     Narrative:       Meter: [id number] : [id number] Juan Brenna    POC Glucose Fingerstick [[id number]]  (Abnormal) Collected:  01/13/17 2113    Specimen:  Blood Updated:  01/13/17 2115     Glucose 150 (H) mg/dL     Narrative:       Meter: [id number] : [id number] Juan Brenna    POC Glucose Fingerstick [[id number]]  (Normal) Collected:  01/13/17 2226    Specimen:  Blood Updated:  01/13/17 2237     Glucose 114 mg/dL     Narrative:       Meter: [id number] : [id number] Juan Brenna    POC Glucose Fingerstick [[id number]]  (Normal) Collected:  01/13/17 2327    Specimen:  Blood Updated:  01/13/17 2331     Glucose 105 mg/dL     Narrative:       Meter: [id number] : [id number] Juan Mckeonnifer    POC Glucose Fingerstick [[id number]]  (Normal) Collected:  01/14/17 0012    Specimen:  Blood Updated:  01/14/17 0013     Glucose 109 mg/dL     Narrative:       Meter: [id number] : [id number] Juan Mccollumfer    POC Glucose Fingerstick [[id number]]  (Normal) Collected:  01/14/17 0123    Specimen:  Blood Updated:  01/14/17 0125     Glucose 122 mg/dL     Narrative:       Meter: [id number] : [id number] Juan Mckeonnifer    POC Glucose Fingerstick [[id number]]  (Normal) Collected:  01/14/17 0159    Specimen:  Blood Updated:  01/14/17 0211     Glucose 120 mg/dL     Narrative:       Meter: [id number] : [id number] Juan Mccollumfer    POC Glucose Fingerstick [[id number]]  (Normal) Collected:  01/14/17 0325    Specimen:  Blood Updated:  01/14/17 0326     Glucose 124 mg/dL     Narrative:       Meter: [id number] : [id number] Juan Ceja    CBC & Differential [[id number]] Collected:  01/14/17 0329    Specimen:  Blood Updated:  01/14/17 0342    Narrative:       The following orders were created for panel order CBC & Differential.  Procedure                               Abnormality         Status                      ---------                               -----------         ------                     CBC Auto Differential[[id number]]         Abnormal            Final result                 Please view results for these tests on the individual orders.    CBC Auto Differential [[id number]]  (Abnormal) Collected:  01/14/17 0329    Specimen:  Blood Updated:  01/14/17 0342     WBC 11.02 (H) 10*3/mm3      RBC 3.93 (L) 10*6/mm3      Hemoglobin 13.4 (L) g/dL      Hematocrit 40.1 (L) %      .0 (H) fL      MCH 34.1 (H) pg      MCHC 33.4 g/dL      RDW 11.9 %      RDW-SD 44.2 fl      MPV 10.3 fL      Platelets 128 (L) 10*3/mm3      Neutrophil % 77.1 (H) %      Lymphocyte % 12.2 (L) %      Monocyte % 10.0 %      Eosinophil % 0.1 (L) %      Basophil % 0.1 %      Immature Grans % 0.5 %      Neutrophils, Absolute 8.51 (H) 10*3/mm3      Lymphocytes, Absolute 1.34 10*3/mm3      Monocytes, Absolute 1.10 10*3/mm3      Eosinophils, Absolute 0.01 10*3/mm3      Basophils, Absolute 0.01 10*3/mm3      Immature Grans, Absolute 0.05 (H) 10*3/mm3     Protime-INR [[id number]]  (Abnormal) Collected:  01/14/17 0329    Specimen:  Blood Updated:  01/14/17 0348     Protime 15.3 (H) Seconds      INR 1.26 (H)     Magnesium [[id number]]  (Normal) Collected:  01/14/17 0329    Specimen:  Blood Updated:  01/14/17 0401     Magnesium 2.4 mg/dL     Renal Function Panel [[id number]]  (Abnormal) Collected:  01/14/17 0329    Specimen:  Blood Updated:  01/14/17 0401     Glucose 132 (H) mg/dL      BUN 12 mg/dL      Creatinine 1.02 mg/dL      Sodium 145 mmol/L      Potassium 4.3 mmol/L      Chloride 103 mmol/L      CO2 26.3 mmol/L      Calcium 9.1 mg/dL      Albumin 5.10 g/dL      Phosphorus 5.5 (H) mg/dL      Anion Gap 15.7 mmol/L      BUN/Creatinine Ratio 11.8      eGFR Non African Amer 78 mL/min/1.73     POC Glucose Fingerstick [[id number]]  (Normal) Collected:  01/14/17 0421    Specimen:  Blood Updated:  01/14/17 0423     Glucose 128 mg/dL     Narrative:       Meter:  [id number] : [id number] Juan Ceja    POC Glucose Fingerstick [[id number]]  (Abnormal) Collected:  01/14/17 1210    Specimen:  Blood Updated:  01/14/17 1211     Glucose 163 (H) mg/dL     Narrative:       Meter: [id number] : [id number] Nano Rivera    POC Glucose Fingerstick [[id number]]  (Abnormal) Collected:  01/14/17 1301    Specimen:  Blood Updated:  01/14/17 1302     Glucose 169 (H) mg/dL     Narrative:       Meter: [id number] : [id number] Everett Haven    POC Glucose Fingerstick [[id number]]  (Abnormal) Collected:  01/14/17 1621    Specimen:  Blood Updated:  01/14/17 1622     Glucose 139 (H) mg/dL     Narrative:       Meter: [id number] : [id number] Everett Haven    POC Glucose Fingerstick [[id number]]  (Abnormal) Collected:  01/14/17 1926    Specimen:  Blood Updated:  01/14/17 1928     Glucose 192 (H) mg/dL     Narrative:       Meter: [id number] : [id number] Robert Branhamie    CBC (No Diff) [[id number]]  (Abnormal) Collected:  01/15/17 0341    Specimen:  Blood Updated:  01/15/17 0424     WBC 12.74 (H) 10*3/mm3      RBC 4.05 (L) 10*6/mm3      Hemoglobin 14.0 g/dL      Hematocrit 40.8 %      .7 (H) fL      MCH 34.6 (H) pg      MCHC 34.3 g/dL      RDW 11.9 %      RDW-SD 43.4 fl      MPV 10.8 fL      Platelets 142 10*3/mm3     Basic Metabolic Panel [[id number]]  (Abnormal) Collected:  01/15/17 0341    Specimen:  Blood Updated:  01/15/17 0435     Glucose 185 (H) mg/dL      BUN 11 mg/dL      Creatinine 0.84 mg/dL      Sodium 138 mmol/L      Potassium 4.4 mmol/L      Chloride 98 mmol/L      CO2 24.3 mmol/L      Calcium 9.4 mg/dL      eGFR Non African Amer 98 mL/min/1.73      BUN/Creatinine Ratio 13.1      Anion Gap 15.7 mmol/L     Narrative:       GFR Normal >60  Chronic Kidney Disease <60  Kidney Failure <15    POC Glucose Fingerstick [[id number]]  (Abnormal) Collected:  01/15/17 0517    Specimen:  Blood Updated:  01/15/17 0518     Glucose 155 (H) mg/dL     Narrative:       Meter: [id number]  : [id number] Robert Posada        Imaging Results (last 72 hours)     Procedure Component Value Units Date/Time    XR Chest PA & Lateral [[id number]] Collected:  01/12/17 1917     Updated:  01/12/17 2003    Narrative:       PA AND LATERAL CHEST     HISTORY: Preoperative exam for open heart surgery. History of  hypertension.      COMPARISON: 2 view chest 01/12/2017.     FINDINGS: The heart and mediastinal structures appear normal. There is  minimal linear scarring or atelectasis in the left base without change.  There is no pulmonary edema or pleural effusion or infiltrate.       Impression:       No acute disease.     This report was finalized on 1/12/2017 8:00 PM by Dr. Tristan Og MD.       XR Chest 2 View [[id number]] Collected:  01/12/17 0730     Updated:  01/12/17 2102    Narrative:       2-VIEW CHEST     HISTORY: Chest pain.     FINDINGS: The lungs are well-expanded and clear and the heart and hilar  structures appear normal. No acute abnormality is seen.     This report was finalized on 1/12/2017 8:59 PM by Dr. Charles Mobley MD.       XR Chest 1 View [[id number]] Collected:  01/13/17 1236     Updated:  01/13/17 1338    Narrative:       AP CHEST - 01/13/2017     HISTORY: Postop open heart surgery.     FINDINGS:  The heart size is within normal limits. There is some patchy  atelectasis or developing infiltrate in the left infrahilar region.  Lungs are underinflated. No pneumothorax is seen.     Endotracheal tube is seen in good position with its tip overlying the  trachea at the level of the clavicles. Right internal jugular Vaughn-Siva  catheter is seen with its tip overlying the right pulmonary artery.  Bilateral chest tubes are seen in good position.     Sternotomy wires are seen.       Impression:       1. Satisfactory position of life-support devices.  2. No pneumothorax is seen.  3. Patchy atelectasis or infiltrate in the left infrahilar region.     This report was finalized on 1/13/2017 1:35 PM by   Percy Gallardo MD.       XR Chest 1 View [[id number]] Collected:  01/14/17 0543     Updated:  01/14/17 0547    Narrative:       ONE VIEW PORTABLE CHEST AT 4:08 AM     HISTORY: Recent cardiac surgery. Chest tubes.     The lungs are moderately expanded with bilateral chest tubes in place  and there is no pneumothorax. There is slight worsening of scattered  atelectasis in the lower left lung compared to yesterday's examination.  The heart remains mildly enlarged. The ET tube has been removed and the  PA line has been replaced by a right jugular sheath.     This report was finalized on 1/14/2017 5:44 AM by Dr. Charles Mobley MD.       XR Chest 1 View [[id number]] Collected:  01/15/17 0802     Updated:  01/15/17 0802    Narrative:       AP CHEST 01/15/2017 AT 0450 HOURS     HISTORY: Postop cardiac surgery.     FINDINGS: The heart size is at the upper limits of normal. Lungs are  underinflated with some vascular crowding. There is some probable  atelectasis in the left perihilar region and left lung base. Bilateral  chest tubes are seen and unchanged in position from yesterday's study.     No pneumothorax is seen.     Sternotomy wires are seen.     The right side central venous catheter has been removed since the  previous study.     Overall the chest appears largely unchanged from 01/14/2017.             Medication Review:       Current Facility-Administered Medications:   •  acetaminophen (TYLENOL) tablet 650 mg, 650 mg, Oral, Q4H PRN, Tita A Jorgensen, APRN, 650 mg at 01/15/17 0226  •  ALPRAZolam (XANAX) tablet 0.25 mg, 0.25 mg, Oral, Q8H PRN, Tita A Jorgensen, APRN, 0.25 mg at 01/13/17 2058  •  aspirin EC tablet 81 mg, 81 mg, Oral, Daily, Tita A Joslyn, APRN, 81 mg at 01/14/17 0811  •  atorvastatin (LIPITOR) tablet 40 mg, 40 mg, Oral, Nightly, Quincy Carolina MD, 40 mg at 01/14/17 2046  •  bisacodyl (DULCOLAX) EC tablet 10 mg, 10 mg, Oral, Daily PRN, Titamaurice Jorgensen, APRN  •  bisacodyl (DULCOLAX) suppository 10 mg, 10 mg,  Rectal, Daily PRN, Tita Jorgensen, APRN  •  cyclobenzaprine (FLEXERIL) tablet 10 mg, 10 mg, Oral, Q8H PRN, Tita Jorgensen, APRN, 10 mg at 01/15/17 0226  •  dextrose (D50W) solution 25 g, 25 g, Intravenous, Q15 Min PRN, Alexandre Farrell MD  •  dextrose (GLUTOSE) oral gel 15 g, 15 g, Oral, Q15 Min PRN, Alexandre Farrell MD  •  enoxaparin (LOVENOX) syringe 40 mg, 40 mg, Subcutaneous, Q24H, Tita Jorgensen, APRN, 40 mg at 17 1820  •  glucagon (human recombinant) (GLUCAGEN DIAGNOSTIC) injection 1 mg, 1 mg, Subcutaneous, Q15 Min PRN, Alexandre Farrell MD  •  guaiFENesin (MUCINEX) 12 hr tablet 1,200 mg, 1,200 mg, Oral, BID, Alexandre Farrell MD, 1,200 mg at 17 1820  •  HYDROcodone-acetaminophen (NORCO) 5-325 MG per tablet 2 tablet, 2 tablet, Oral, Q4H PRN, Tita Jorgensen, APRN, 2 tablet at 17 1350  •  Influenza Vac Subunit Quad (FLUCELVAX) injection 0.5 mL, 0.5 mL, Intramuscular, During Hospitalization, Kaiser Abdi MD  •  insulin aspart (novoLOG) injection 2-5 Units, 2-5 Units, Subcutaneous, 4x Daily AC & at Bedtime, Basilio PETIT MD, 2 Units at 01/15/17 0700  •  magnesium hydroxide (MILK OF MAGNESIA) suspension 2400 mg/10mL 10 mL, 10 mL, Oral, Daily PRN, Tita Jorgensen, APRN  •  metoprolol tartrate (LOPRESSOR) tablet 12.5 mg, 12.5 mg, Oral, Q12H, Tita Jorgensen, APRN, 12.5 mg at 17 2045  •  morphine injection 4 mg, 4 mg, Intravenous, Q2H PRN, 4 mg at 17 1932 **AND** naloxone (NARCAN) injection 0.4 mg, 0.4 mg, Intravenous, Q5 Min PRN, Tita Jorgensen APRN  •  mupirocin (BACTROBAN) 2 % nasal ointment 1 application, 1 application, Each Nare, Daily, PATRICA Solis, 1 application at 17 0811  •  [] morphine injection 4 mg, 4 mg, Intravenous, Q15 Min PRN, 4 mg at 17 1836 **AND** naloxone (NARCAN) injection 0.4 mg, 0.4 mg, Intravenous, Q5 Min PRN, Tita Jorgensen APRN  •  niCARdipine (CARDENE) 25 mg/250 mL 0.9% NS VTB (mbp), 5-15 mg/hr, Intravenous, Continuous  PRN, Tita Jorgensen, APRN, Stopped at 01/14/17 0755  •  ondansetron (ZOFRAN) tablet 4 mg, 4 mg, Oral, Q6H PRN **OR** ondansetron ODT (ZOFRAN-ODT) disintegrating tablet 4 mg, 4 mg, Oral, Q6H PRN **OR** ondansetron (ZOFRAN) injection 4 mg, 4 mg, Intravenous, Q6H PRN, Diandra Suarez MD, 4 mg at 01/13/17 1047  •  ondansetron (ZOFRAN) injection 4 mg, 4 mg, Intravenous, Q6H PRN, Tita Jorgensen, APRN  •  oxyCODONE (ROXICODONE) immediate release tablet 10 mg, 10 mg, Oral, Q4H PRN, Tita A Joslyn, APRN, 10 mg at 01/15/17 0629  •  [DISCONTINUED] pantoprazole (PROTONIX) injection 40 mg, 40 mg, Intravenous, Q AM **OR** pantoprazole (PROTONIX) EC tablet 40 mg, 40 mg, Oral, Q AM, Tita A Joslyn, APRN, 40 mg at 01/15/17 0700  •  potassium chloride (MICRO-K) CR capsule 40 mEq, 40 mEq, Oral, PRN **OR** potassium chloride (KLOR-CON) packet 40 mEq, 40 mEq, Oral, PRN, Tita Jorgensen, APRN  •  potassium chloride (MICRO-K) CR capsule 40 mEq, 40 mEq, Oral, PRN **OR** potassium chloride (KLOR-CON) packet 40 mEq, 40 mEq, Oral, PRN **OR** potassium chloride 10 mEq in 100 mL IVPB, 10 mEq, Intravenous, Q1H PRN, Alexandre Farrell MD  •  potassium chloride 10 mEq in 100 mL IVPB, 10 mEq, Intravenous, Q1H PRN **OR** potassium chloride 10 mEq in 100 mL IVPB, 10 mEq, Intravenous, Q1H PRN, Tita Jorgensen, APRN  •  [DISCONTINUED] promethazine (PHENERGAN) tablet 12.5 mg, 12.5 mg, Oral, Q6H PRN **OR** promethazine (PHENERGAN) injection 12.5 mg, 12.5 mg, Intravenous, Q6H PRN, PATRICA Solis  •  sennosides-docusate sodium (SENOKOT-S) 8.6-50 MG tablet 2 tablet, 2 tablet, Oral, Nightly, PATRICA Solis, 2 tablet at 01/14/17 2046  •  sodium chloride 0.9 % flush 1-10 mL, 1-10 mL, Intravenous, PRN, Diandra Suarez MD  •  temazepam (RESTORIL) capsule 15 mg, 15 mg, Oral, Nightly PRN, Alexandre Farrell MD, 15 mg at 01/12/17 4486    Assessment/Plan     Patient Active Problem List   Diagnosis   • Chest pain at rest     uncontrolled type 2 diabetes  mellitus  Status post coronary artery bypass grafting postop day 2  Obesity  Hyperlipidemia  Hypertension     Hemoglobin A1c 7.2%  Triglycerides are elevated.    Will continue the patient on sliding scale  Will start januvia for now  Patient may start eating reg diet today    Will monitor accuchecks and adjust medication    The total time spent for old record and lab review and face- to- face was more than 35 min of which greater than 50% of time was spent on counseling the patient on recommended evaluation and treatment options, instructions for management/treatment and /or follow up  and importance of compliance with chosen management or treatment options      Basilio Sharp MD FACE.  01/15/17  9:12 AM      EMR Dragon / transcription disclaimer:    Much of this encounter note is an electronic transcription/ translation of spoken language to printed text.  Electronic translation of spoken language may permit erroneous, or at times, nonsensical words or phrases to be inadvertently transcribed; although I have reviewed the note for such errors, some may still exist.

## 2017-01-15 NOTE — PROGRESS NOTES
Hospital Follow Up    LOS:  LOS: 2 days   Patient Name: Oswald Savage  Age/Sex: 46 y.o. male  : 1970  MRN: [id number]    Date of Hospital Visit: 01/15/17  Length of Stay: 2  Encounter Provider: Aquilino Shultz MD  Place of Service: [facility] CARDIOLOGY    Subjective:     Chief Complaint: f/u CABG    Interval History: Patient now status post CABG postop day #2.    Objective:     Objective:  Temp:  [98.3 °F (36.8 °C)-98.8 °F (37.1 °C)] 98.6 °F (37 °C)  Heart Rate:  [56-74] 74  Resp:  [12-16] 14  BP: ()/(65-82) 156/77  Body mass index is 30.95 kg/(m^2).    Intake/Output Summary (Last 24 hours) at 01/15/17 0913  Last data filed at 01/15/17 0503   Gross per 24 hour   Intake      0 ml   Output   1295 ml   Net  -1295 ml     Last 3 weights    17  1344 17  1930 01/15/17  0646   Weight: 234 lb (106 kg) 234 lb (106 kg) 234 lb 9.6 oz (106 kg)     Weight change: 9.6 oz (0.272 kg)    Physical Exam:   General Appearance: Alert, cooperative, in no acute distress. AAOx4.   HEENT: Normocephalic.  Neck: Supple. No JVD. No Carotid bruit. No thyromegaly  Lungs: CTAB. Normal respiratory effort and rate.  Heart:: Regular rate and rhythm, normal S1 and S2, no murmurs, gallops or rubs.  Abdomen: Soft, nontender, non-distended. positive bowel sounds  Extremities: Warm, no cyanosis, or clubbing. No edema.     Lab Review:     Results from last 7 days  Lab Units 01/15/17  0341 17  0329  17  1953 17  0110   SODIUM mmol/L 138 145  < > 142 140   POTASSIUM mmol/L 4.4 4.3  < > 4.1 4.0   CHLORIDE mmol/L 98 103  < > 101 100   TOTAL CO2 mmol/L 24.3 26.3  < > 26.1 26.3   BUN mg/dL 11 12  < > 10 16   CREATININE mg/dL 0.84 1.02  < > 0.86 1.08   GLUCOSE mg/dL 185* 132*  < > 167* 234*   CALCIUM mg/dL 9.4 9.1  < > 9.5 9.6   AST (SGOT) U/L  --   --   --  32 31   ALT (SGPT) U/L  --   --   --  51* 48*   < > = values in this interval not displayed.    Results from last 7  days  Lab Units 01/12/17  0659 01/12/17  0110   TROPONIN T ng/mL <0.010 <0.010       Results from last 7 days  Lab Units 01/15/17  0341 01/14/17  0329   WBC 10*3/mm3 12.74* 11.02*   HEMOGLOBIN g/dL 14.0 13.4*   HEMATOCRIT % 40.8 40.1*   PLATELETS 10*3/mm3 142 128*       Results from last 7 days  Lab Units 01/14/17  0329 01/13/17  1201   INR  1.26* 1.25*   APTT seconds  --  25.7       Results from last 7 days  Lab Units 01/14/17  0329 01/13/17  1529   MAGNESIUM mg/dL 2.4 2.4       Results from last 7 days  Lab Units 01/12/17  1953   CHOLESTEROL mg/dL 144   TRIGLYCERIDES mg/dL 165*   HDL CHOL mg/dL 29*                 I reviewed the patient's new clinical results.          I personally viewed and interpreted the patient's EKG/Telemetry data.  Current Medications:   Scheduled Meds:  aspirin 81 mg Oral Daily   atorvastatin 40 mg Oral Nightly   enoxaparin 40 mg Subcutaneous Q24H   guaiFENesin 1,200 mg Oral BID   insulin aspart 2-5 Units Subcutaneous 4x Daily AC & at Bedtime   metoprolol tartrate 12.5 mg Oral Q12H   mupirocin 1 application Each Nare Daily   pantoprazole 40 mg Oral Q AM   sennosides-docusate sodium 2 tablet Oral Nightly     Continuous Infusions:  niCARdipine 5-15 mg/hr Last Rate: Stopped (01/14/17 0755)       Allergies:  No Known Allergies    Assessment & Plan     1. Ischemic heart disease: Status post previous inferior myocardial infarction, severe three-vessel coronary disease, status post CABG  2. Hypertension: Needs adjustment  3. Diabetes mellitus  4. Hyperlipidemia: On medical therapy          Aquilino Shultz MD  01/15/17

## 2017-01-16 ENCOUNTER — APPOINTMENT (OUTPATIENT)
Dept: GENERAL RADIOLOGY | Facility: HOSPITAL | Age: 47
End: 2017-01-16
Attending: THORACIC SURGERY (CARDIOTHORACIC VASCULAR SURGERY)

## 2017-01-16 LAB
ABO + RH BLD: NORMAL
ABO + RH BLD: NORMAL
ANION GAP SERPL CALCULATED.3IONS-SCNC: 17.3 MMOL/L
BH BB BLOOD EXPIRATION DATE: NORMAL
BH BB BLOOD EXPIRATION DATE: NORMAL
BH BB BLOOD TYPE BARCODE: 7300
BH BB BLOOD TYPE BARCODE: 7300
BH BB DISPENSE STATUS: NORMAL
BH BB DISPENSE STATUS: NORMAL
BH BB PRODUCT CODE: NORMAL
BH BB PRODUCT CODE: NORMAL
BH BB UNIT NUMBER: NORMAL
BH BB UNIT NUMBER: NORMAL
BUN BLD-MCNC: 14 MG/DL (ref 6–20)
BUN/CREAT SERPL: 17.5 (ref 7–25)
CALCIUM SPEC-SCNC: 9.6 MG/DL (ref 8.6–10.5)
CHLORIDE SERPL-SCNC: 95 MMOL/L (ref 98–107)
CO2 SERPL-SCNC: 24.7 MMOL/L (ref 22–29)
CREAT BLD-MCNC: 0.8 MG/DL (ref 0.76–1.27)
DEPRECATED RDW RBC AUTO: 41.2 FL (ref 37–54)
ERYTHROCYTE [DISTWIDTH] IN BLOOD BY AUTOMATED COUNT: 11.6 % (ref 11.5–14.5)
GFR SERPL CREATININE-BSD FRML MDRD: 103 ML/MIN/1.73
GLUCOSE BLD-MCNC: 251 MG/DL (ref 65–99)
GLUCOSE BLDC GLUCOMTR-MCNC: 244 MG/DL (ref 70–130)
GLUCOSE BLDC GLUCOMTR-MCNC: 258 MG/DL (ref 70–130)
GLUCOSE BLDC GLUCOMTR-MCNC: 302 MG/DL (ref 70–130)
GLUCOSE BLDC GLUCOMTR-MCNC: 306 MG/DL (ref 70–130)
HCT VFR BLD AUTO: 43.1 % (ref 40.4–52.2)
HGB BLD-MCNC: 15.3 G/DL (ref 13.7–17.6)
MCH RBC QN AUTO: 34.5 PG (ref 27–32.7)
MCHC RBC AUTO-ENTMCNC: 35.5 G/DL (ref 32.6–36.4)
MCV RBC AUTO: 97.3 FL (ref 79.8–96.2)
PLATELET # BLD AUTO: 151 10*3/MM3 (ref 140–500)
PMV BLD AUTO: 10.8 FL (ref 6–12)
POTASSIUM BLD-SCNC: 4.2 MMOL/L (ref 3.5–5.2)
RBC # BLD AUTO: 4.43 10*6/MM3 (ref 4.6–6)
SODIUM BLD-SCNC: 137 MMOL/L (ref 136–145)
UNIT  ABO: NORMAL
UNIT  ABO: NORMAL
UNIT  RH: NORMAL
UNIT  RH: NORMAL
WBC NRBC COR # BLD: 10 10*3/MM3 (ref 4.5–10.7)

## 2017-01-16 PROCEDURE — 97110 THERAPEUTIC EXERCISES: CPT

## 2017-01-16 PROCEDURE — 63710000001 INSULIN DETEMER PER 5 UNITS: Performed by: INTERNAL MEDICINE

## 2017-01-16 PROCEDURE — 94799 UNLISTED PULMONARY SVC/PX: CPT | Performed by: NURSE PRACTITIONER

## 2017-01-16 PROCEDURE — 93005 ELECTROCARDIOGRAM TRACING: CPT | Performed by: INTERNAL MEDICINE

## 2017-01-16 PROCEDURE — 99232 SBSQ HOSP IP/OBS MODERATE 35: CPT | Performed by: INTERNAL MEDICINE

## 2017-01-16 PROCEDURE — 85027 COMPLETE CBC AUTOMATED: CPT | Performed by: NURSE PRACTITIONER

## 2017-01-16 PROCEDURE — 80048 BASIC METABOLIC PNL TOTAL CA: CPT | Performed by: NURSE PRACTITIONER

## 2017-01-16 PROCEDURE — 93010 ELECTROCARDIOGRAM REPORT: CPT | Performed by: INTERNAL MEDICINE

## 2017-01-16 PROCEDURE — 99233 SBSQ HOSP IP/OBS HIGH 50: CPT | Performed by: INTERNAL MEDICINE

## 2017-01-16 PROCEDURE — 25010000002 DIGOXIN PER 500 MCG: Performed by: INTERNAL MEDICINE

## 2017-01-16 PROCEDURE — 71020 HC CHEST PA AND LATERAL: CPT

## 2017-01-16 PROCEDURE — 25010000002 ENOXAPARIN PER 10 MG: Performed by: NURSE PRACTITIONER

## 2017-01-16 PROCEDURE — 63710000001 INSULIN ASPART PER 5 UNITS: Performed by: INTERNAL MEDICINE

## 2017-01-16 PROCEDURE — 99024 POSTOP FOLLOW-UP VISIT: CPT | Performed by: NURSE PRACTITIONER

## 2017-01-16 PROCEDURE — 82962 GLUCOSE BLOOD TEST: CPT

## 2017-01-16 RX ORDER — AMIODARONE HYDROCHLORIDE 200 MG/1
400 TABLET ORAL EVERY 12 HOURS SCHEDULED
Status: DISCONTINUED | OUTPATIENT
Start: 2017-01-16 | End: 2017-01-18 | Stop reason: HOSPADM

## 2017-01-16 RX ORDER — NICOTINE 21 MG/24HR
1 PATCH, TRANSDERMAL 24 HOURS TRANSDERMAL DAILY
Status: DISCONTINUED | OUTPATIENT
Start: 2017-01-16 | End: 2017-01-18 | Stop reason: HOSPADM

## 2017-01-16 RX ORDER — METOPROLOL SUCCINATE 25 MG/1
25 TABLET, EXTENDED RELEASE ORAL EVERY 12 HOURS SCHEDULED
Status: DISCONTINUED | OUTPATIENT
Start: 2017-01-16 | End: 2017-01-18 | Stop reason: HOSPADM

## 2017-01-16 RX ORDER — DIGOXIN 0.25 MG/ML
250 INJECTION INTRAMUSCULAR; INTRAVENOUS ONCE
Status: COMPLETED | OUTPATIENT
Start: 2017-01-16 | End: 2017-01-16

## 2017-01-16 RX ADMIN — MAGNESIUM HYDROXIDE 10 ML: 2400 SUSPENSION ORAL at 18:05

## 2017-01-16 RX ADMIN — MUPIROCIN 1 APPLICATION: 20 OINTMENT TOPICAL at 09:31

## 2017-01-16 RX ADMIN — DIGOXIN 250 MCG: 0.25 INJECTION INTRAMUSCULAR; INTRAVENOUS at 00:24

## 2017-01-16 RX ADMIN — OXYCODONE HYDROCHLORIDE 10 MG: 5 TABLET ORAL at 04:42

## 2017-01-16 RX ADMIN — DOCUSATE SODIUM -SENNOSIDES 2 TABLET: 50; 8.6 TABLET, COATED ORAL at 21:22

## 2017-01-16 RX ADMIN — INSULIN ASPART 5 UNITS: 100 INJECTION, SOLUTION INTRAVENOUS; SUBCUTANEOUS at 17:56

## 2017-01-16 RX ADMIN — AMIODARONE HYDROCHLORIDE 400 MG: 200 TABLET ORAL at 09:31

## 2017-01-16 RX ADMIN — INSULIN ASPART 4 UNITS: 100 INJECTION, SOLUTION INTRAVENOUS; SUBCUTANEOUS at 17:56

## 2017-01-16 RX ADMIN — OXYCODONE HYDROCHLORIDE 10 MG: 5 TABLET ORAL at 23:33

## 2017-01-16 RX ADMIN — OXYCODONE HYDROCHLORIDE 10 MG: 5 TABLET ORAL at 19:30

## 2017-01-16 RX ADMIN — ATORVASTATIN CALCIUM 40 MG: 40 TABLET, FILM COATED ORAL at 21:22

## 2017-01-16 RX ADMIN — SITAGLIPTIN 100 MG: 100 TABLET, FILM COATED ORAL at 09:31

## 2017-01-16 RX ADMIN — METOPROLOL TARTRATE 25 MG: 25 TABLET ORAL at 09:31

## 2017-01-16 RX ADMIN — INSULIN DETEMIR 10 UNITS: 100 INJECTION, SOLUTION SUBCUTANEOUS at 22:06

## 2017-01-16 RX ADMIN — AMIODARONE HYDROCHLORIDE 400 MG: 200 TABLET ORAL at 21:22

## 2017-01-16 RX ADMIN — ACETAMINOPHEN 650 MG: 325 TABLET ORAL at 04:42

## 2017-01-16 RX ADMIN — INSULIN ASPART 5 UNITS: 100 INJECTION, SOLUTION INTRAVENOUS; SUBCUTANEOUS at 11:17

## 2017-01-16 RX ADMIN — GUAIFENESIN 1200 MG: 600 TABLET, EXTENDED RELEASE ORAL at 17:56

## 2017-01-16 RX ADMIN — INSULIN ASPART 3 UNITS: 100 INJECTION, SOLUTION INTRAVENOUS; SUBCUTANEOUS at 07:06

## 2017-01-16 RX ADMIN — OXYCODONE HYDROCHLORIDE 10 MG: 5 TABLET ORAL at 13:54

## 2017-01-16 RX ADMIN — METOPROLOL SUCCINATE 25 MG: 25 TABLET, FILM COATED, EXTENDED RELEASE ORAL at 21:22

## 2017-01-16 RX ADMIN — DILTIAZEM HYDROCHLORIDE 15 MG/HR: 100 INJECTION, POWDER, LYOPHILIZED, FOR SOLUTION INTRAVENOUS at 03:52

## 2017-01-16 RX ADMIN — GUAIFENESIN 1200 MG: 600 TABLET, EXTENDED RELEASE ORAL at 09:31

## 2017-01-16 RX ADMIN — INSULIN ASPART 5 UNITS: 100 INJECTION, SOLUTION INTRAVENOUS; SUBCUTANEOUS at 13:08

## 2017-01-16 RX ADMIN — ASPIRIN 81 MG: 81 TABLET ORAL at 09:31

## 2017-01-16 RX ADMIN — NICOTINE 1 PATCH: 21 PATCH, EXTENDED RELEASE TRANSDERMAL at 17:57

## 2017-01-16 RX ADMIN — ENOXAPARIN SODIUM 40 MG: 40 INJECTION SUBCUTANEOUS at 17:56

## 2017-01-16 RX ADMIN — INSULIN DETEMIR 10 UNITS: 100 INJECTION, SOLUTION SUBCUTANEOUS at 13:05

## 2017-01-16 RX ADMIN — INSULIN ASPART 5 UNITS: 100 INJECTION, SOLUTION INTRAVENOUS; SUBCUTANEOUS at 21:22

## 2017-01-16 RX ADMIN — PANTOPRAZOLE SODIUM 40 MG: 40 TABLET, DELAYED RELEASE ORAL at 07:06

## 2017-01-16 NOTE — PLAN OF CARE
Problem: Patient Care Overview (Adult)  Goal: Plan of Care Review    01/16/17 0930   Coping/Psychosocial Response Interventions   Plan Of Care Reviewed With patient   Patient Care Overview   Progress improving   Outcome Evaluation   Outcome Summary/Follow up Plan Pt demonstrated improved tolerance of activity with increased gait distance

## 2017-01-16 NOTE — PROGRESS NOTES
46 y.o.   LOS: 3 days   Patient Care Team:  Rose Marie Farr MD as PCP - General (Internal Medicine)    Chief Complaint:  Uncontrolled T2DM and post operative management    Chief Complaint   Patient presents with   • Chest Pain     MIDSTERNAL CHEST BURNING, NO RADIATION. PT REPORTS MILD SOA. FELT GENERALIZED WEAKNESS AT ONSET. ONSET APROX 2350       Subjective     HPI  Patient is postoperative day #1  He started eating regular diet  Accu-Cheks are stable  He did not receive much insulin scale  The patient's home regimen includes Manoj 2 mg twice daily and Janumet one tablet twice daily  I will not use Manoj but we'll start Januvia 100 mg daily and monitor response  Also will start Levemir 10 units twice daily and NovoLog 5 units before each meal    Interval History:    Patient is a 46-year-old male from [place] with a past medical history of uncontrolled type 2 diabetes mellitus, hypertension, hyperlipidemia, tobacco abuse, coronary artery disease with prior angioplasty in 2008 admitted to the hospital with chest pain and further evaluation revealed that he needs coronary artery bypass surgery.  Patient underwent surgery yesterday and was on insulin drip until this morning when it was discontinued.  Patient is currently extubated and is not on IV insulin  He is not eating yet is very groggy and has received pain medication.  Most of the history was obtained from chart review.  As per the nurse patient will advance to maybe clear liquids this evening     Patient is very groggy and could not provide much information  Home med list reviewed and he is on Janumet and Amaryl at home.  Compliance is under question  It is also unclear if the patient has any diabetes related complications.  Review of Systems:      Review of Systems   Constitutional: Positive for fatigue.   Respiratory: Positive for chest tightness. Negative for shortness of breath.    Cardiovascular: Negative for chest pain.   Gastrointestinal: Negative  for abdominal distention and nausea.   All other systems reviewed and are negative.    Objective     Vital Signs   Temp:  [97.7 °F (36.5 °C)-98.9 °F (37.2 °C)] 98 °F (36.7 °C)  Heart Rate:  [] 69  Resp:  [16-18] 16  BP: (107-147)/(72-96) 139/78    Physical Exam:  Physical Exam   Constitutional: He is oriented to person, place, and time. He appears well-developed and well-nourished.   HENT:   Head: Normocephalic and atraumatic.   Eyes: EOM are normal. Pupils are equal, round, and reactive to light.   Neck: Normal range of motion. Neck supple. No thyromegaly present.   Cardiovascular: Normal rate, regular rhythm, normal heart sounds and intact distal pulses.    Pulmonary/Chest: Effort normal and breath sounds normal. He has no wheezes. He has no rales.   Abdominal: Soft. Bowel sounds are normal. He exhibits distension. There is no tenderness.   Musculoskeletal: Normal range of motion. He exhibits no edema.   Neurological: He is alert and oriented to person, place, and time.   Skin: Skin is warm and dry.   Psychiatric: He has a normal mood and affect. His behavior is normal.   Nursing note and vitals reviewed.  Results Review:     I reviewed the patient's new clinical results.      GLUCOSE   Date/Time Value Ref Range Status   01/16/2017 0333 251 (H) 65 - 99 mg/dL Final   01/15/2017 0341 185 (H) 65 - 99 mg/dL Final   01/14/2017 0329 132 (H) 65 - 99 mg/dL Final   01/13/2017 1529 102 (H) 65 - 99 mg/dL Final     Lab Results (last 72 hours)     Procedure Component Value Units Date/Time    Protime-INR [[id number]]  (Abnormal) Collected:  01/13/17 1201    Specimen:  Blood Updated:  01/13/17 1258     Protime 15.2 (H) Seconds      INR 1.25 (H)     aPTT [[id number]]  (Normal) Collected:  01/13/17 1201    Specimen:  Blood Updated:  01/13/17 1258     PTT 25.7 seconds     POC Glucose Fingerstick [[id number]]  (Normal) Collected:  01/13/17 1329    Specimen:  Blood Updated:  01/13/17 1332     Glucose 96 mg/dL     Narrative:        Meter: [id number] : [id number] Keaton Enriquez    POC Glucose Fingerstick [[id number]]  (Normal) Collected:  01/13/17 1449    Specimen:  Blood Updated:  01/13/17 1450     Glucose 90 mg/dL     Narrative:       Meter: [id number] : [id number] Keaton Enriquez    Blood Gas, Arterial [[id number]]  (Abnormal) Collected:  01/13/17 1528    Specimen:  Arterial Blood Updated:  01/13/17 1530     Site Arterial Line      Sameer's Test N/A      pH, Arterial 7.403 pH units      pCO2, Arterial 41.9 mm Hg      pO2, Arterial 116.7 (H) mm Hg      HCO3, Arterial 26.1 mmol/L      Base Excess, Arterial 1.1 mmol/L      O2 Saturation Calculated 98.6 %      A-a Gradiant 0.5 mmHg      Barometric Pressure for Blood Gas 765.5 mmHg      Modality Adult Vent      FIO2 40 %      Ventilator Mode VC      Set Tidal Volume 800      Set Mech Resp Rate 14      Rate 14 Breaths/minute      PEEP 7.5     Narrative:       Meter: [id number] : [id number] Graf Stapleton    CBC (No Diff) [[id number]]  (Abnormal) Collected:  01/13/17 1529    Specimen:  Blood Updated:  01/13/17 1544     WBC 10.80 (H) 10*3/mm3      RBC 4.09 (L) 10*6/mm3      Hemoglobin 14.3 g/dL      Hematocrit 39.8 (L) %      MCV 97.3 (H) fL      MCH 35.0 (H) pg      MCHC 35.9 g/dL      RDW 11.8 %      RDW-SD 42.0 fl      MPV 10.4 fL      Platelets 117 (L) 10*3/mm3     Renal Function Panel [[id number]]  (Abnormal) Collected:  01/13/17 1529    Specimen:  Blood Updated:  01/13/17 1602     Glucose 102 (H) mg/dL      BUN 11 mg/dL      Creatinine 1.07 mg/dL      Sodium 145 mmol/L      Potassium 4.3 mmol/L      Chloride 107 mmol/L      CO2 23.6 mmol/L      Calcium 9.1 mg/dL      Albumin 4.60 g/dL      Phosphorus 3.6 mg/dL      Anion Gap 14.4 mmol/L      BUN/Creatinine Ratio 10.3      eGFR Non African Amer 74 mL/min/1.73     Magnesium [[id number]]  (Normal) Collected:  01/13/17 1529    Specimen:  Blood Updated:  01/13/17 1602     Magnesium 2.4 mg/dL     POC Glucose Fingerstick [[id number]]  (Abnormal)  Collected:  01/13/17 1708    Specimen:  Blood Updated:  01/13/17 1728     Glucose 175 (H) mg/dL     Narrative:       Meter: [id number] : [id number] Keaton Enriquez    Blood Gas, Arterial [[id number]]  (Abnormal) Collected:  01/13/17 1747    Specimen:  Arterial Blood Updated:  01/13/17 1804     Site Arterial Line      Sameer's Test N/A      pH, Arterial 7.330 (L) pH units      pCO2, Arterial 49.2 (H) mm Hg      pO2, Arterial 112.9 (H) mm Hg      HCO3, Arterial 26.0 mmol/L      Base Excess, Arterial -0.6 (L) mmol/L      O2 Saturation Calculated 98.0 %      A-a Gradiant 0.5 mmHg      Barometric Pressure for Blood Gas 766.2 mmHg      Modality Adult Vent      FIO2 40 %      Ventilator Mode PS      Set Tidal Volume 700      Set Mech Resp Rate 14      PEEP 7.5      PSV 8 cmH2O     Narrative:       Meter: [id number] : [id number] Graf Stapleton    POC Glucose Fingerstick [[id number]]  (Abnormal) Collected:  01/13/17 1840    Specimen:  Blood Updated:  01/13/17 1844     Glucose 152 (H) mg/dL     Narrative:       Meter: [id number] : [id number] Keaton Enriquez    POC Glucose Fingerstick [[id number]]  (Abnormal) Collected:  01/13/17 1942    Specimen:  Blood Updated:  01/13/17 1943     Glucose 157 (H) mg/dL     Narrative:       Meter: [id number] : [id number] Juan Ceja    Potassium [[id number]]  (Normal) Collected:  01/13/17 1944    Specimen:  Blood Updated:  01/13/17 2016     Potassium 4.8 mmol/L     POC Glucose Fingerstick [[id number]]  (Abnormal) Collected:  01/13/17 2019    Specimen:  Blood Updated:  01/13/17 2021     Glucose 150 (H) mg/dL     Narrative:       Meter: [id number] : [id number] Juan Ceja    POC Glucose Fingerstick [[id number]]  (Abnormal) Collected:  01/13/17 2113    Specimen:  Blood Updated:  01/13/17 2115     Glucose 150 (H) mg/dL     Narrative:       Meter: [id number] : [id number] Juan Brenna    POC Glucose Fingerstick [[id number]]  (Normal) Collected:  01/13/17 3661    Specimen:   Blood Updated:  01/13/17 2237     Glucose 114 mg/dL     Narrative:       Meter: [id number] : [id number] Juan Mccollumfer    POC Glucose Fingerstick [[id number]]  (Normal) Collected:  01/13/17 2327    Specimen:  Blood Updated:  01/13/17 2331     Glucose 105 mg/dL     Narrative:       Meter: [id number] : [id number] Juan Brenna    POC Glucose Fingerstick [[id number]]  (Normal) Collected:  01/14/17 0012    Specimen:  Blood Updated:  01/14/17 0013     Glucose 109 mg/dL     Narrative:       Meter: [id number] : [id number] Juan Brenna    POC Glucose Fingerstick [[id number]]  (Normal) Collected:  01/14/17 0123    Specimen:  Blood Updated:  01/14/17 0125     Glucose 122 mg/dL     Narrative:       Meter: [id number] : [id number] Juan Brenna    POC Glucose Fingerstick [[id number]]  (Normal) Collected:  01/14/17 0159    Specimen:  Blood Updated:  01/14/17 0211     Glucose 120 mg/dL     Narrative:       Meter: [id number] : [id number] Juan Brenna    POC Glucose Fingerstick [[id number]]  (Normal) Collected:  01/14/17 0325    Specimen:  Blood Updated:  01/14/17 0326     Glucose 124 mg/dL     Narrative:       Meter: [id number] : [id number] Juan Ceja    CBC & Differential [[id number]] Collected:  01/14/17 0329    Specimen:  Blood Updated:  01/14/17 0342    Narrative:       The following orders were created for panel order CBC & Differential.  Procedure                               Abnormality         Status                     ---------                               -----------         ------                     CBC Auto Differential[[id number]]         Abnormal            Final result                 Please view results for these tests on the individual orders.    CBC Auto Differential [[id number]]  (Abnormal) Collected:  01/14/17 0329    Specimen:  Blood Updated:  01/14/17 0342     WBC 11.02 (H) 10*3/mm3      RBC 3.93 (L) 10*6/mm3      Hemoglobin 13.4 (L) g/dL      Hematocrit 40.1 (L) %       .0 (H) fL      MCH 34.1 (H) pg      MCHC 33.4 g/dL      RDW 11.9 %      RDW-SD 44.2 fl      MPV 10.3 fL      Platelets 128 (L) 10*3/mm3      Neutrophil % 77.1 (H) %      Lymphocyte % 12.2 (L) %      Monocyte % 10.0 %      Eosinophil % 0.1 (L) %      Basophil % 0.1 %      Immature Grans % 0.5 %      Neutrophils, Absolute 8.51 (H) 10*3/mm3      Lymphocytes, Absolute 1.34 10*3/mm3      Monocytes, Absolute 1.10 10*3/mm3      Eosinophils, Absolute 0.01 10*3/mm3      Basophils, Absolute 0.01 10*3/mm3      Immature Grans, Absolute 0.05 (H) 10*3/mm3     Protime-INR [[id number]]  (Abnormal) Collected:  01/14/17 0329    Specimen:  Blood Updated:  01/14/17 0348     Protime 15.3 (H) Seconds      INR 1.26 (H)     Magnesium [[id number]]  (Normal) Collected:  01/14/17 0329    Specimen:  Blood Updated:  01/14/17 0401     Magnesium 2.4 mg/dL     Renal Function Panel [[id number]]  (Abnormal) Collected:  01/14/17 0329    Specimen:  Blood Updated:  01/14/17 0401     Glucose 132 (H) mg/dL      BUN 12 mg/dL      Creatinine 1.02 mg/dL      Sodium 145 mmol/L      Potassium 4.3 mmol/L      Chloride 103 mmol/L      CO2 26.3 mmol/L      Calcium 9.1 mg/dL      Albumin 5.10 g/dL      Phosphorus 5.5 (H) mg/dL      Anion Gap 15.7 mmol/L      BUN/Creatinine Ratio 11.8      eGFR Non African Amer 78 mL/min/1.73     POC Glucose Fingerstick [[id number]]  (Normal) Collected:  01/14/17 0421    Specimen:  Blood Updated:  01/14/17 0423     Glucose 128 mg/dL     Narrative:       Meter: [id number] : [id number] Juan Ceja    POC Glucose Fingerstick [[id number]]  (Abnormal) Collected:  01/14/17 1210    Specimen:  Blood Updated:  01/14/17 1211     Glucose 163 (H) mg/dL     Narrative:       Meter: [id number] : [id number] Nano Rivera    POC Glucose Fingerstick [[id number]]  (Abnormal) Collected:  01/14/17 1301    Specimen:  Blood Updated:  01/14/17 1302     Glucose 169 (H) mg/dL     Narrative:       Meter: [id number] : [id number] Karlos Orourke     POC Glucose Fingerstick [[id number]]  (Abnormal) Collected:  01/14/17 1621    Specimen:  Blood Updated:  01/14/17 1622     Glucose 139 (H) mg/dL     Narrative:       Meter: [id number] : [id number] Karlos Haven    POC Glucose Fingerstick [[id number]]  (Abnormal) Collected:  01/14/17 1926    Specimen:  Blood Updated:  01/14/17 1928     Glucose 192 (H) mg/dL     Narrative:       Meter: [id number] : [id number] Robert Posada    CBC (No Diff) [[id number]]  (Abnormal) Collected:  01/15/17 0341    Specimen:  Blood Updated:  01/15/17 0424     WBC 12.74 (H) 10*3/mm3      RBC 4.05 (L) 10*6/mm3      Hemoglobin 14.0 g/dL      Hematocrit 40.8 %      .7 (H) fL      MCH 34.6 (H) pg      MCHC 34.3 g/dL      RDW 11.9 %      RDW-SD 43.4 fl      MPV 10.8 fL      Platelets 142 10*3/mm3     Basic Metabolic Panel [[id number]]  (Abnormal) Collected:  01/15/17 0341    Specimen:  Blood Updated:  01/15/17 0435     Glucose 185 (H) mg/dL      BUN 11 mg/dL      Creatinine 0.84 mg/dL      Sodium 138 mmol/L      Potassium 4.4 mmol/L      Chloride 98 mmol/L      CO2 24.3 mmol/L      Calcium 9.4 mg/dL      eGFR Non African Amer 98 mL/min/1.73      BUN/Creatinine Ratio 13.1      Anion Gap 15.7 mmol/L     Narrative:       GFR Normal >60  Chronic Kidney Disease <60  Kidney Failure <15    POC Glucose Fingerstick [[id number]]  (Abnormal) Collected:  01/15/17 0517    Specimen:  Blood Updated:  01/15/17 0518     Glucose 155 (H) mg/dL     Narrative:       Meter: [id number] : [id number] Robert Posada    POC Glucose Fingerstick [[id number]]  (Abnormal) Collected:  01/15/17 1035    Specimen:  Blood Updated:  01/15/17 1036     Glucose 214 (H) mg/dL     Narrative:       Meter: [id number] : [id number] Souza Lizabeth M.    POC Glucose Fingerstick [[id number]]  (Abnormal) Collected:  01/15/17 1551    Specimen:  Blood Updated:  01/15/17 1552     Glucose 214 (H) mg/dL     Narrative:       Meter: [id number] : [id number] Libby SHEFFIELD    POC  Glucose Fingerstick [[id number]]  (Abnormal) Collected:  01/15/17 1932    Specimen:  Blood Updated:  01/15/17 1933     Glucose 227 (H) mg/dL     Narrative:       Meter: [id number] : [id number] Robert Posada    CBC (No Diff) [[id number]]  (Abnormal) Collected:  01/16/17 0333    Specimen:  Blood Updated:  01/16/17 0413     WBC 10.00 10*3/mm3      RBC 4.43 (L) 10*6/mm3      Hemoglobin 15.3 g/dL      Hematocrit 43.1 %      MCV 97.3 (H) fL      MCH 34.5 (H) pg      MCHC 35.5 g/dL      RDW 11.6 %      RDW-SD 41.2 fl      MPV 10.8 fL      Platelets 151 10*3/mm3     Basic Metabolic Panel [[id number]]  (Abnormal) Collected:  01/16/17 0333    Specimen:  Blood Updated:  01/16/17 0434     Glucose 251 (H) mg/dL      BUN 14 mg/dL      Creatinine 0.80 mg/dL      Sodium 137 mmol/L      Potassium 4.2 mmol/L      Chloride 95 (L) mmol/L      CO2 24.7 mmol/L      Calcium 9.6 mg/dL      eGFR Non African Amer 103 mL/min/1.73      BUN/Creatinine Ratio 17.5      Anion Gap 17.3 mmol/L     Narrative:       GFR Normal >60  Chronic Kidney Disease <60  Kidney Failure <15    POC Glucose Fingerstick [[id number]]  (Abnormal) Collected:  01/16/17 0551    Specimen:  Blood Updated:  01/16/17 0552     Glucose 244 (H) mg/dL     Narrative:       Meter: [id number] : [id number] Robert Posada    POC Glucose Fingerstick [[id number]]  (Abnormal) Collected:  01/16/17 1105    Specimen:  Blood Updated:  01/16/17 1107     Glucose 306 (H) mg/dL     Narrative:       Meter: [id number] : [id number] Kush Ceja        Imaging Results (last 72 hours)     Procedure Component Value Units Date/Time    XR Chest 1 View [[id number]] Collected:  01/13/17 1236     Updated:  01/13/17 1338    Narrative:       AP CHEST - 01/13/2017     HISTORY: Postop open heart surgery.     FINDINGS:  The heart size is within normal limits. There is some patchy  atelectasis or developing infiltrate in the left infrahilar region.  Lungs are underinflated. No pneumothorax is seen.      Endotracheal tube is seen in good position with its tip overlying the  trachea at the level of the clavicles. Right internal jugular [place]  catheter is seen with its tip overlying the right pulmonary artery.  Bilateral chest tubes are seen in good position.     Sternotomy wires are seen.       Impression:       1. Satisfactory position of life-support devices.  2. No pneumothorax is seen.  3. Patchy atelectasis or infiltrate in the left infrahilar region.     This report was finalized on 1/13/2017 1:35 PM by Dr. Percy Gallardo MD.       XR Chest 1 View [[id number]] Collected:  01/14/17 0543     Updated:  01/14/17 0547    Narrative:       ONE VIEW PORTABLE CHEST AT 4:08 AM     HISTORY: Recent cardiac surgery. Chest tubes.     The lungs are moderately expanded with bilateral chest tubes in place  and there is no pneumothorax. There is slight worsening of scattered  atelectasis in the lower left lung compared to yesterday's examination.  The heart remains mildly enlarged. The ET tube has been removed and the  PA line has been replaced by a right jugular sheath.     This report was finalized on 1/14/2017 5:44 AM by Dr. Charles Mobley MD.       XR Chest 1 View [[id number]] Collected:  01/15/17 0802     Updated:  01/15/17 1543    Narrative:       AP CHEST 01/15/2017 AT 0450 HOURS     HISTORY: Postop cardiac surgery.     FINDINGS: The heart size is at the upper limits of normal. Lungs are  underinflated with some vascular crowding. There is some probable  atelectasis in the left perihilar region and left lung base. Bilateral  chest tubes are seen and unchanged in position from yesterday's study.     No pneumothorax is seen.     Sternotomy wires are seen.     The right side central venous catheter has been removed since the  previous study.     Overall the chest appears largely unchanged from 01/14/2017.     This report was finalized on 1/15/2017 3:40 PM by Dr. Percy Gallardo MD.       XR Chest 1 View [[id number]]  Collected:  01/15/17 1347     Updated:  01/15/17 1553    Narrative:       AP CHEST 01/15/2017     HISTORY: Chest tube removal.     FINDINGS: There is no evidence of pneumothorax following the bilateral  chest tube removal. There are some patchy areas of mild probable  atelectasis in both lung bases. Heart size is mildly enlarged.  Sternotomy wires are seen. Lungs are underinflated.       Impression:       No evidence of pneumothorax following the chest tube  removal.     This report was finalized on 1/15/2017 3:50 PM by Dr. Percy Gallardo MD.             Medication Review:       Current Facility-Administered Medications:   •  acetaminophen (TYLENOL) tablet 650 mg, 650 mg, Oral, Q4H PRN, Tita A Jorgensen, APRN, 650 mg at 01/16/17 0442  •  ALPRAZolam (XANAX) tablet 0.25 mg, 0.25 mg, Oral, Q8H PRN, Tita A Joslyn, APRN, 0.25 mg at 01/13/17 2058  •  amiodarone (PACERONE) tablet 400 mg, 400 mg, Oral, Q12H, Diandra Suarez MD, 400 mg at 01/16/17 0931  •  aspirin EC tablet 81 mg, 81 mg, Oral, Daily, Tita A Joslyn, APRN, 81 mg at 01/16/17 0931  •  atorvastatin (LIPITOR) tablet 40 mg, 40 mg, Oral, Nightly, Quincy Carolina MD, 40 mg at 01/15/17 2036  •  bisacodyl (DULCOLAX) EC tablet 10 mg, 10 mg, Oral, Daily PRN, Tita Jorgensen, APRN  •  bisacodyl (DULCOLAX) suppository 10 mg, 10 mg, Rectal, Daily PRN, Titamaurice Jorgensen, APRN  •  cyclobenzaprine (FLEXERIL) tablet 10 mg, 10 mg, Oral, Q8H PRN, Tita Jorgensen, APRN, 10 mg at 01/15/17 0226  •  dextrose (D50W) solution 25 g, 25 g, Intravenous, Q15 Min PRN, Alexandre Farrell MD  •  dextrose (GLUTOSE) oral gel 15 g, 15 g, Oral, Q15 Min PRN, Alexandre Farrell MD  •  enoxaparin (LOVENOX) syringe 40 mg, 40 mg, Subcutaneous, Q24H, Tita Jorgensen, APRN, 40 mg at 01/15/17 1844  •  glucagon (human recombinant) (GLUCAGEN DIAGNOSTIC) injection 1 mg, 1 mg, Subcutaneous, Q15 Min PRN, Alexandre Farrell MD  •  guaiFENesin (MUCINEX) 12 hr tablet 1,200 mg, 1,200 mg, Oral, BID, Alexandre Farrell,  MD, 1,200 mg at 17 0931  •  HYDROcodone-acetaminophen (NORCO) 5-325 MG per tablet 2 tablet, 2 tablet, Oral, Q4H PRN, Tita Jorgensen APRN, 2 tablet at 17 1350  •  Influenza Vac Subunit Quad (FLUCELVAX) injection 0.5 mL, 0.5 mL, Intramuscular, During Hospitalization, Kaiser Abdi MD  •  insulin aspart (novoLOG) injection 2-5 Units, 2-5 Units, Subcutaneous, 4x Daily AC & at Bedtime, Basilio PETIT MD, 5 Units at 17 1117  •  insulin aspart (novoLOG) injection 5 Units, 5 Units, Subcutaneous, TID With Meals, Basilio PETIT MD  •  insulin detemir (LEVEMIR) injection 10 Units, 10 Units, Subcutaneous, Q12H, Basilio PETIT MD  •  magnesium hydroxide (MILK OF MAGNESIA) suspension 2400 mg/10mL 10 mL, 10 mL, Oral, Daily PRN, Tita Jorgensen, APRN  •  metoprolol tartrate (LOPRESSOR) tablet 25 mg, 25 mg, Oral, Q12H, Aquilino Shultz MD, 25 mg at 17 0931  •  morphine injection 4 mg, 4 mg, Intravenous, Q2H PRN, 4 mg at 17 1932 **AND** naloxone (NARCAN) injection 0.4 mg, 0.4 mg, Intravenous, Q5 Min PRN, Tita Jorgensen, APRN  •  mupirocin (BACTROBAN) 2 % nasal ointment 1 application, 1 application, Each Nare, Daily, Tita Jorgensen APRN, 1 application at 17 0931  •  [] morphine injection 4 mg, 4 mg, Intravenous, Q15 Min PRN, 4 mg at 17 1836 **AND** naloxone (NARCAN) injection 0.4 mg, 0.4 mg, Intravenous, Q5 Min PRN, Tita Jorgensen, APRN  •  niCARdipine (CARDENE) 25 mg/250 mL 0.9% NS VTB (mbp), 5-15 mg/hr, Intravenous, Continuous PRN, PATRICA Solis, Stopped at 17 0755  •  ondansetron (ZOFRAN) tablet 4 mg, 4 mg, Oral, Q6H PRN **OR** ondansetron ODT (ZOFRAN-ODT) disintegrating tablet 4 mg, 4 mg, Oral, Q6H PRN **OR** ondansetron (ZOFRAN) injection 4 mg, 4 mg, Intravenous, Q6H PRN, Diandra Suarez MD, 4 mg at 17 1047  •  ondansetron (ZOFRAN) injection 4 mg, 4 mg, Intravenous, Q6H PRN, PATRICA Solis  •  oxyCODONE (ROXICODONE) immediate  release tablet 10 mg, 10 mg, Oral, Q4H PRN, Tita A Jorgensen, APRN, 10 mg at 01/16/17 0442  •  [DISCONTINUED] pantoprazole (PROTONIX) injection 40 mg, 40 mg, Intravenous, Q AM **OR** pantoprazole (PROTONIX) EC tablet 40 mg, 40 mg, Oral, Q AM, Tita A Jorgensen, APRN, 40 mg at 01/16/17 0706  •  potassium chloride (MICRO-K) CR capsule 40 mEq, 40 mEq, Oral, PRN **OR** potassium chloride (KLOR-CON) packet 40 mEq, 40 mEq, Oral, PRN, Tita A Jorgensen, APRN  •  potassium chloride (MICRO-K) CR capsule 40 mEq, 40 mEq, Oral, PRN **OR** potassium chloride (KLOR-CON) packet 40 mEq, 40 mEq, Oral, PRN **OR** potassium chloride 10 mEq in 100 mL IVPB, 10 mEq, Intravenous, Q1H PRN, Alexandre Farrell MD  •  potassium chloride 10 mEq in 100 mL IVPB, 10 mEq, Intravenous, Q1H PRN **OR** potassium chloride 10 mEq in 100 mL IVPB, 10 mEq, Intravenous, Q1H PRN, Tita A Jorgensen, APRN  •  [DISCONTINUED] promethazine (PHENERGAN) tablet 12.5 mg, 12.5 mg, Oral, Q6H PRN **OR** promethazine (PHENERGAN) injection 12.5 mg, 12.5 mg, Intravenous, Q6H PRN, Tita A Jorgensen, APRN  •  sennosides-docusate sodium (SENOKOT-S) 8.6-50 MG tablet 2 tablet, 2 tablet, Oral, Nightly, Tita A Jorgensen, APRN, 2 tablet at 01/15/17 2036  •  SITagliptin (JANUVIA) tablet 100 mg, 100 mg, Oral, Daily, Basilio PETIT MD, 100 mg at 01/16/17 0931  •  sodium chloride 0.9 % flush 1-10 mL, 1-10 mL, Intravenous, PRN, Diandra Suarez MD  •  temazepam (RESTORIL) capsule 15 mg, 15 mg, Oral, Nightly PRN, Alexandre Farrell MD, 15 mg at 01/12/17 6029    Assessment/Plan     Patient Active Problem List   Diagnosis   • Chest pain at rest     uncontrolled type 2 diabetes mellitus  Status post coronary artery bypass grafting postop day 2  Obesity  Hyperlipidemia  Hypertension      Hemoglobin A1c 7.2%  Triglycerides are elevated.     Started Levemir 10 units twice daily  NovoLog 5 units before each meal along with a scale  He will also continue Januvia 100 mg daily.  I plan to fine tune the regimen  prior to his discharge  He may be able to manage with the basal insulin and the Janumet at home    Will monitor  closely and then adjust as needed.  I discussed my plan with the patient and his wife and both verbalize understanding    The total time spent for old record and lab review and face- to- face was more than 35 min of which greater than 50% of time was spent on counseling the patient on recommended evaluation and treatment options, instructions for management/treatment and /or follow up  and importance of compliance with chosen management or treatment options      Basilio Sharp MD FACE.  01/16/17  12:56 PM      EMR Dragon / transcription disclaimer:    Much of this encounter note is an electronic transcription/ translation of spoken language to printed text.  Electronic translation of spoken language may permit erroneous, or at times, nonsensical words or phrases to be inadvertently transcribed; although I have reviewed the note for such errors, some may still exist.

## 2017-01-16 NOTE — PROGRESS NOTES
Discharge Planning Assessment  [facility]     Patient Name: Oswald Savage  MRN: [id number]  Today's Date: 1/16/2017    Admit Date: 1/12/2017          Discharge Needs Assessment       01/16/17 1413    Living Environment    Lives With spouse;child(aleta), dependent    Living Arrangements house    Provides Primary Care For spouse;child(aleta)    Quality Of Family Relationships supportive;helpful;involved    Able to Return to Prior Living Arrangements yes    Discharge Needs Assessment    Concerns To Be Addressed denies needs/concerns at this time    Outpatient/Agency/Support Group Needs homecare agency (specify level of care)    Anticipated Changes Related to Illness inability to work    Equipment Currently Used at Home none    Equipment Needed After Discharge none    Discharge Facility/Level Of Care Needs home with home health    Transportation Available family or friend will provide    Discharge Disposition still a patient            Discharge Plan       01/16/17 1414    Case Management/Social Work Plan    Plan Home with [facility] to follow.      Patient/Family In Agreement With Plan yes    Additional Comments S/W PT, his spouse, two brothers and three dtrs at bedside.  Introduced self and role of CCP.  Confirmed information on f/s.  Pt IADL'S, worked full time and drove prior to admission.  Pt denies HH, SNF AND DME in past.  Pt given choices of HH agencies and Pt chose [facility] to follow him at home.  Pt states he will return home with spouse and children assist.  Referral called to Emilee leonardo/ GRACIE LEHMAN at 13:53 pm.          Discharge Placement     Facility/Agency Request Status Selected? Address Phone Number Fax Number    [facility] Accepted    [address] [address] [phone]                Demographic Summary       01/16/17 1408    Referral Information    Admission Type inpatient    Contact Information    Comments Permission granted to speak in  front of Pt two brothers and Pt three youg dtrs at bedside.      Primary Care Physician Information    Name Rose Marie Farr MD             Functional Status       01/16/17 2668    Functional Status Current    Ambulation 2-->assistive person    Transferring 2-->assistive person    Toileting 2-->assistive person    Bathing 2-->assistive person    Dressing 2-->assistive person    Eating 0-->independent    Communication 0-->understands/communicates without difficulty    Swallowing (if score 2 or more for any item, consult Rehab Services) 0-->swallows foods/liquids without difficulty    Functional Status Prior    Ambulation 0-->independent    Transferring 0-->independent    Toileting 0-->independent    Bathing 0-->independent    Dressing 0-->independent    Eating 0-->independent    Communication 0-->understands/communicates without difficulty    Swallowing 0-->swallows foods/liquids without difficulty    Cognitive/Perceptual/Developmental    Current Mental Status/Cognitive Functioning no deficits noted            Psychosocial     None            Abuse/Neglect     None            Legal     None            Substance Abuse     None            Patient Forms     None          Deborah Gill RN

## 2017-01-16 NOTE — PROGRESS NOTES
"      PROGRESS NOTE   LOS: 3 days   Patient Care Team:  Rose Marie Farr MD as PCP - General (Internal Medicine)    Chief Complaint: No respiratory complaint on room air since yesterday    Interval History: Patient is status post 7 vessel CABG and he   was reported to have snoring and was having some desaturation at night and was evaluated for sleep apnea with initial screening test was an overnight oximetry that was reviewed and summarized below.  He denies any hypersomnia as he does have some dry mouth at night, he is agreeable for outpatient evaluation with a home sleep study.  REVIEW OF SYSTEMS:   No shortness of breath on room air no chest pain beyond typical post CABG no GI or  complaints.         Vital Signs  Temp:  [97.7 °F (36.5 °C)-98.9 °F (37.2 °C)] 97.7 °F (36.5 °C)  Heart Rate:  [] 68  Resp:  [16-18] 17  BP: (107-151)/(72-96) 135/72    Intake/Output Summary (Last 24 hours) at 01/16/17 1123  Last data filed at 01/16/17 0758   Gross per 24 hour   Intake    720 ml   Output      0 ml   Net    720 ml     Flowsheet Rows         First Filed Value    Admission Height  73\" (185.4 cm) Documented at 01/12/2017 0102    Admission Weight  245 lb (111 kg) Documented at 01/12/2017 0102          Physical Exam:   General Appearance:    Alert, cooperative, in no acute distress   Lungs:     Clear to auscultation,respirations regular, even and                  unlabored, decreased over the bases but no crackles     Heart:    Regular rhythm and normal rate, normal S1 and S2, no            murmur, no gallop, no rub, no click   Chest Wall:    No abnormalities observed   Abdomen:     Normal bowel sounds, no masses, no organomegaly, soft        non-tender, non-distended, no guarding, no rebound                tenderness   Extremities:   Moves all extremities well, no edema, no cyanosis, no             redness  Mallampati 4 airways was enlarged uvula      Results Review:        Results from last 7 days  Lab Units " 01/16/17  0333 01/15/17  0341 01/14/17  0329 01/13/17  1944 01/13/17  1529 01/13/17  1200 01/13/17  0349 01/12/17 1953   SODIUM mmol/L 137 138 145  --  145 143 139 142   POTASSIUM mmol/L 4.2 4.4 4.3 4.8 4.3 4.0 4.2 4.1   CHLORIDE mmol/L 95* 98 103  --  107 104 101 101   TOTAL CO2 mmol/L 24.7 24.3 26.3  --  23.6 25.7 24.3 26.1   BUN mg/dL 14 11 12  --  11 11 11 10   CREATININE mg/dL 0.80 0.84 1.02  --  1.07 1.24 0.93 0.86   CALCIUM mg/dL 9.6 9.4 9.1  --  9.1 9.3 9.0 9.5       Results from last 7 days  Lab Units 01/12/17  0659 01/12/17  0110   TROPONIN T ng/mL <0.010 <0.010       Results from last 7 days  Lab Units 01/16/17  0333 01/15/17  0341 01/14/17  0329 01/13/17  1529 01/13/17  1201 01/13/17  1059 01/13/17  1034  01/12/17 1953 01/12/17  0110   WBC 10*3/mm3 10.00 12.74* 11.02* 10.80* 8.75  --   --   --  7.22 8.05   HEMOGLOBIN g/dL 15.3 14.0 13.4* 14.3 14.6  --   --   --  15.7 15.7   HEMOGLOBIN, ARTERIAL POC g/dL  --   --   --   --   --  11.6* 11.6*  < >  --   --    HEMATOCRIT % 43.1 40.8 40.1* 39.8* 41.2  --   --   --  45.5 43.6   HEMATOCRIT POC %  --   --   --   --   --  34* 34*  < >  --   --    PLATELETS 10*3/mm3 151 142 128* 117* 113*  --   --   --  196 200   < > = values in this interval not displayed.    Results from last 7 days  Lab Units 01/14/17  0329 01/13/17  1201   INR  1.26* 1.25*   APTT seconds  --  25.7       Results from last 7 days  Lab Units 01/14/17  0329   MAGNESIUM mg/dL 2.4       Results from last 7 days  Lab Units 01/12/17  1953   CHOLESTEROL mg/dL 144   TRIGLYCERIDES mg/dL 165*   HDL CHOL mg/dL 29*       Results from last 7 days  Lab Units 01/13/17  1747 01/13/17  1528 01/13/17  1227 01/13/17  1059 01/13/17  1034 01/13/17  1003 01/13/17  0943  01/13/17  0414   PH, ARTERIAL pH units 7.330* 7.403 7.357 7.34* 7.37 7.38 7.30*  < > 7.407   PO2 ART mm Hg 112.9* 116.7* 253.3*  --   --   --   --   --  77.3*   PCO2, ARTERIAL mm Hg 49.2* 41.9 44.7  --   --   --   --   --  43.0   HCO3 ART mmol/L  26.0 26.1 25.0  --   --   --   --   --  27.1   < > = values in this interval not displayed.    Results from last 7 days  Lab Units 01/12/17  1953   HEMOGLOBIN A1C % 7.20*     GLUCOSE   Date/Time Value Ref Range Status   01/16/2017 1105 306 (H) 70 - 130 mg/dL Final   01/16/2017 0551 244 (H) 70 - 130 mg/dL Final   01/15/2017 1932 227 (H) 70 - 130 mg/dL Final   01/15/2017 1551 214 (H) 70 - 130 mg/dL Final   01/15/2017 1035 214 (H) 70 - 130 mg/dL Final   01/15/2017 0517 155 (H) 70 - 130 mg/dL Final   01/14/2017 1926 192 (H) 70 - 130 mg/dL Final   01/14/2017 1621 139 (H) 70 - 130 mg/dL Final          Overnight oximetry was positive for sleep related hypoxemia with desaturation that can be suggestive of obstructive sleep apnea.          Medication Review:     amiodarone 400 mg Oral Q12H   aspirin 81 mg Oral Daily   atorvastatin 40 mg Oral Nightly   enoxaparin 40 mg Subcutaneous Q24H   guaiFENesin 1,200 mg Oral BID   insulin aspart 2-5 Units Subcutaneous 4x Daily AC & at Bedtime   metoprolol tartrate 25 mg Oral Q12H   mupirocin 1 application Each Nare Daily   pantoprazole 40 mg Oral Q AM   sennosides-docusate sodium 2 tablet Oral Nightly   SITagliptin 100 mg Oral Daily         niCARdipine 5-15 mg/hr Last Rate: Stopped (01/14/17 0755)       ASSESSMENT:   1. Coronary artery disease status post CABGx7 on this admission and previous history of angioplasty in 2007  2. Obstructive sleep apnea suspected based on his overnight oximetry for further confirmation with an outpatient sleep study  3. No significant daytime fatigue or hypersomnia however patient has obesity and narrow airway on physical exam  4. Obesity  5. Diabetes mellitus  6. Systemic hypertension  7. Sleep related hypoxemia based on the overnight oximetry expect that to improve as his pulmonary function continues to recover postoperatively    PLAN:  Patient is to stay off the oxygen during the daytime will resume it at night depending on the degree of desaturation,  we will do an exercise oximetry on room air prior to discharge and will decide on the nocturnal oxygen accordingly, given the pattern of desaturation it was felt to be more of a postop pulmonary related rather than primarily sleep apnea as the cause of his hypoxemia, and if he does well on the exercise oximetry on the day of dischargein a couple of days, he will less likely need the oxygen at night, however; we will still try to arrange for the home sleep study within a day or so  after discharge, and will address if there is any persistent hypoxemia or will treat sleep apnea if confirmed  Discussed with patient and with family  Jun Crews MD  01/16/17  11:23 AM      EMR Dragon/Transcription disclaimer:   Much of this encounter note is an electronic transcription/translation of spoken language to printed text. The electronic translation of spoken language may permit erroneous, or at times, nonsensical words or phrases to be inadvertently transcribed; Although I have reviewed the note for such errors, some may still exist.

## 2017-01-16 NOTE — PROGRESS NOTES
Acute Care - Physical Therapy Treatment Note  [facility]     Patient Name: Oswald Savage  : 1970  MRN: [id number]  Today's Date: 2017  Onset of Illness/Injury or Date of Surgery Date: 17          Admit Date: 2017    Visit Dx:    ICD-10-CM ICD-9-CM   1. Chest pain at rest R07.9 786.50   2. Angina at rest I20.8 413.9   3. Generalized weakness R53.1 780.79     Patient Active Problem List   Diagnosis   • Chest pain at rest               Adult Rehabilitation Note       17 0929 01/15/17 0955       Rehab Assessment/Intervention    Discipline physical therapy assistant  -DM physical therapist  -CH     Document Type therapy note (daily note)  -DM therapy note (daily note)  -     Subjective Information agree to therapy  -DM agree to therapy;complains of;pain  -CH     Patient Effort, Rehab Treatment good  -DM good  -CH     Symptoms Noted During/After Treatment  none  -CH     Precautions/Limitations fall precautions;cardiac precautions;sternal precautions  -DM cardiac precautions;fall precautions;sternal precautions  -CH     Recorded by [DM] Andrea Mcgrath PTA [CH] Albina Bains, PT     Pain Assessment    Pain Assessment 0-10  -DM 0-10  -CH     Pain Score 3  -DM 4  -CH     Pain Location Chest  -DM Chest  -CH     Pain Intervention(s) Repositioned  -DM Repositioned  -CH     Recorded by [DM] Andrea Mcgrath PTA [CH] Albina Bains, PT     Cognitive Assessment/Intervention    Current Cognitive/Communication Assessment  functional  -CH     Orientation Status  oriented x 4  -CH     Follows Commands/Answers Questions  100% of the time  -CH     Personal Safety  WNL/WFL  -CH     Personal Safety Interventions  fall prevention program maintained;gait belt;nonskid shoes/slippers when out of bed  -CH     Recorded by  [CH] Albnia Bains, PT     Bed Mobility, Assessment/Treatment    Bed Mob, Supine to Sit, [place] contact guard assist  -DM not tested  -CH     Bed Mob, Sit to Supine,  [place] contact guard assist  -DM not tested  -CH     Recorded by [DM] Andrea Mcgrath PTA [CH] Albina Bains PT     Transfer Assessment/Treatment    Transfers, Sit-Stand [place] contact guard assist  -DM verbal cues required;nonverbal cues required (demo/gesture);2 person assist required;hand held assist;minimum assist (75% patient effort)  -     Transfers, Stand-Sit [place] contact guard assist  -DM verbal cues required;nonverbal cues required (demo/gesture);minimum assist (75% patient effort);2 person assist required;hand held assist  -CH     Recorded by [DM] Andrea Mcgrath PTA [CH] Albina Bains PT     Gait Assessment/Treatment    Gait, [place] Level contact guard assist  -DM verbal cues required;nonverbal cues required (demo/gesture);contact guard assist;2 person assist required;hand held assist  -CH     Gait, Assistive Device --   No AD  -DM      Gait, Distance (Feet) 240  -  -CH     Gait, Gait Deviations brandy decreased;limb motion velocity decreased;step length decreased;stride length decreased;toe-to-floor clearance decreased  -DM brandy decreased;step length decreased;stride length decreased  -     Gait, Safety Issues step length decreased  -DM      Recorded by [DM] Andrea Mcgrath PTA [CH] Albina Bains PT     Therapy Exercises    Exercise Protocols --   cardiac  -DM --   5 reps cardiac protocol, level 3  -CH     Recorded by [DM] Andrea Mcgrath PTA [CH] Albina Bains PT     Positioning and Restraints    Pre-Treatment Position in bed  -DM sitting in chair/recliner  -CH     Post Treatment Position bed   in chair earlier  -DM chair  -CH     In Bed supine;call light within reach;encouraged to call for assist;with family/caregiver  -DM      In Chair  sitting;call light within reach;encouraged to call for assist;with family/caregiver  -CH     Recorded by [VINCENZO] Andrea Mcgrath PTA [CH] Albina Bains PT       User Key  (r) = Recorded By,  (t) = Taken By, (c) = Cosigned By    Initials Name Effective Dates    DM Andrea Mcgrath, PTA 05/18/15 -      Albina Bains, PT 12/01/15 -                 IP PT Goals       01/14/17 0913          Cardiopulmonary PT LTG    Cardiopulmonary PT LTG, Time to Achieve 1 wk  -CH      Cardiopulmonary PT LTG, Level Level V  -CH        User Key  (r) = Recorded By, (t) = Taken By, (c) = Cosigned By    Initials Name Provider Type     Albina Bains, PT Physical Therapist          Physical Therapy Education     Title: PT OT SLP Therapies (Done)     Topic: Physical Therapy (Done)     Point: Mobility training (Done)    Learning Progress Summary    Learner Readiness Method Response Comment Documented by Status   Patient Acceptance E,D VU  DM 01/16/17 0931 Done    Acceptance E,TB,D VU,NR   01/15/17 1002 Done    Acceptance E,D,TB VU,NR   01/14/17 0912 Done               Point: Home exercise program (Done)    Learning Progress Summary    Learner Readiness Method Response Comment Documented by Status   Patient Acceptance E,D VU  DM 01/16/17 0931 Done    Acceptance E,TB,D VU,NR   01/15/17 1002 Done               Point: Body mechanics (Done)    Learning Progress Summary    Learner Readiness Method Response Comment Documented by Status   Patient Acceptance E,D VU  DM 01/16/17 0931 Done    Acceptance E,TB,D VU,NR   01/15/17 1002 Done    Acceptance E,D,TB VU,NR   01/14/17 0912 Done               Point: Precautions (Done)    Learning Progress Summary    Learner Readiness Method Response Comment Documented by Status   Patient Acceptance E,D VU  DM 01/16/17 0931 Done    Acceptance E,TB,D VU,NR   01/15/17 1002 Done    Acceptance E,D,TB VU,NR   01/14/17 0912 Done                      User Key     Initials Effective Dates Name Provider Type Discipline    DM 05/18/15 -  Andrea Mcgrath, PTA Physical Therapy Assistant PT     12/01/15 -  Albina Bains, PT Physical Therapist PT                    PT Recommendation  and Plan  Anticipated Discharge Disposition: home with home health  Planned Therapy Interventions: balance training, bed mobility training, gait training, home exercise program, patient/family education, ROM (Range of Motion), stair training, transfer training  PT Frequency: daily  Plan of Care Review  Plan Of Care Reviewed With: patient  Progress: improving  Outcome Summary/Follow up Plan: Pt demonstrated improved tolerance of activity with increased gait distance          Outcome Measures       01/16/17 0900 01/15/17 1000 01/14/17 0900    How much help from another person do you currently need...    Turning from your back to your side while in flat bed without using bedrails? 3  -DM 3  -CH 3  -CH    Moving from lying on back to sitting on the side of a flat bed without bedrails? 3  -DM 3  -CH 3  -CH    Moving to and from a bed to a chair (including a wheelchair)? 3  -DM 3  -CH 3  -CH    Standing up from a chair using your arms (e.g., wheelchair, bedside chair)? 3  -DM 3  -CH 3  -CH    Climbing 3-5 steps with a railing? 2  -DM 2  -CH 2  -CH    To walk in hospital room? 3  -DM 3  -CH 3  -CH    AM-PAC 6 Clicks Score 17  -DM 17  -CH 17  -CH    Functional Assessment    Outcome Measure Options AM-PAC 6 Clicks Basic Mobility (PT)  -DM AM-PAC 6 Clicks Basic Mobility (PT)  -CH AM-PAC 6 Clicks Basic Mobility (PT)  -CH      User Key  (r) = Recorded By, (t) = Taken By, (c) = Cosigned By    Initials Name Provider Type    VINCENZO Mcgrath PTA Physical Therapy Assistant     Albina Bains, PT Physical Therapist           Time Calculation:         PT Charges       01/16/17 0931          Time Calculation    Start Time 0916  -DM      Stop Time 0931  -DM      Time Calculation (min) 15 min  -DM      PT Received On 01/16/17  -DM      PT - Next Appointment 01/17/17  -DM        User Key  (r) = Recorded By, (t) = Taken By, (c) = Cosigned By    Initials Name Provider Type    VINCENZO Mcgrath PTA Physical Therapy Assistant           Therapy Charges for Today     Code Description Service Date Service Provider Modifiers Qty    33757442554 HC PT THER PROC EA 15 MIN 1/16/2017 Andrea Mcgrath, PTA GP 1          PT G-Codes  Outcome Measure Options: AM-PAC 6 Clicks Basic Mobility (PT)    Andrea Mcgrath, PTA  1/16/2017

## 2017-01-16 NOTE — PLAN OF CARE
Problem: Patient Care Overview (Adult)  Goal: Plan of Care Review  Outcome: Ongoing (interventions implemented as appropriate)    01/16/17 1502   Coping/Psychosocial Response Interventions   Plan Of Care Reviewed With patient   Outcome Evaluation   Outcome Summary/Follow up Plan patient in atrial fib./flutter at the start of my shift. Patient converted to NSR around 0730. cardizem grtt stopped and po amioderone started. vital signs stable. pain controlled throughout shift       Goal: Adult Individualization and Mutuality  Outcome: Ongoing (interventions implemented as appropriate)  Goal: Discharge Needs Assessment  Outcome: Ongoing (interventions implemented as appropriate)    Problem: Cardiac Surgery (Adult)  Goal: Signs and Symptoms of Listed Potential Problems Will be Absent or Manageable (Cardiac Surgery)  Outcome: Ongoing (interventions implemented as appropriate)    Problem: Fall Risk (Adult)  Goal: Absence of Falls  Outcome: Ongoing (interventions implemented as appropriate)

## 2017-01-16 NOTE — CONSULTS
"Diabetes Education  Assessment/Teaching    Patient Name:  Oswald Savage  YOB: 1970  MRN: [id number]  Admit Date:  1/12/2017      Assessment Date:  1/16/2017       Most Recent Value    General Information      Referral From:  , Blood glucose, MD order [A1C 7.2%]    Height  6' 1\" (1.854 m)    Height Method  Stated    Weight  233 lb 6.4 oz (106 kg)    Pregnancy Assessment     Diabetes History     What type of diabetes do you have?  Type 2    Length of Diabetes Diagnosis  6 - 10 years    Current DM knowledge  poor    Have you had diabetes education/teaching in the past?  no    Do you test your blood sugar at home?  yes    Frequency of checks  -- [1-3 times day]    Do you have any diabetes complications?  circulation problems, heart disease    Education Preferences     Nutrition Information     Assessment Topics     Healthy Eating - Assessment  N/A-unable to assess    Being Active - Assessment  N/A- unable to assess    Taking Medication - Assessment  N/A-unable to assess    Problem Solving - Assessment  N/A-unable to assess    Reducing Risk - Assessment  N/A-unable to assess    Healthy Coping - Assessment  N/A-unable to assess    Monitoring - Assessment  N/A-unable to assess    DM Goals                Most Recent Value    DM Education Needs     Meter  Has own    Frequency of Testing  3 times a day    Healthy Coping  Appropriate    Discharge Plan  Home    Motivation  Not interested    Teaching Method  Discussion    Patient Response  Refused            Other Comments:  Pt refuses insulin teach at this time. States he does not want to start on insulin and wants to get home and discuss this with his PCP of 9 years.        Electronically signed by:  Sherry Le RN  01/16/17 2:37 PM  "

## 2017-01-16 NOTE — PROGRESS NOTES
Hospital Follow Up        Chief Complaint: Follow up s/p CABG, post-op Afib    Interval History: Went into atrial flutter last night.  Converted back to sinus rhythm this am. Could feel heart racing but otherwise no significant symptoms while in flutter.     Objective:     Objective:  Temp:  [97.7 °F (36.5 °C)-99.5 °F (37.5 °C)] 97.7 °F (36.5 °C)  Heart Rate:  [] 60  Resp:  [15-18] 17  BP: (107-160)/(75-96) 135/77     Intake/Output Summary (Last 24 hours) at 01/16/17 0819  Last data filed at 01/16/17 0758   Gross per 24 hour   Intake    720 ml   Output      0 ml   Net    720 ml     Body mass index is 30.79 kg/(m^2).  Last 3 weights    01/13/17  1930 01/15/17  0646 01/16/17  0500   Weight: 234 lb (106 kg) 234 lb 9.6 oz (106 kg) 233 lb 6.4 oz (106 kg)     Weight change: -1 lb 3.2 oz (-0.544 kg)      Physical Exam:   General : Alert, cooperative, in no acute distress.  Neuro: alert,cooperative and oriented  Lungs: CTAB. Normal respiratory effort and rate.  CV:: Regular rate and rhythm, normal S1 and S2, no murmurs, gallops or rubs.  ABD: Soft, nontender, non-distended. positive bowel sounds  Extr: No edema or cyanosis, moves all extremities    Lab Review:     Results from last 7 days  Lab Units 01/16/17  0333 01/15/17  0341  01/12/17  1953 01/12/17  0110   SODIUM mmol/L 137 138  < > 142 140   POTASSIUM mmol/L 4.2 4.4  < > 4.1 4.0   CHLORIDE mmol/L 95* 98  < > 101 100   TOTAL CO2 mmol/L 24.7 24.3  < > 26.1 26.3   BUN mg/dL 14 11  < > 10 16   CREATININE mg/dL 0.80 0.84  < > 0.86 1.08   GLUCOSE mg/dL 251* 185*  < > 167* 234*   CALCIUM mg/dL 9.6 9.4  < > 9.5 9.6   AST (SGOT) U/L  --   --   --  32 31   ALT (SGPT) U/L  --   --   --  51* 48*   < > = values in this interval not displayed.    Results from last 7 days  Lab Units 01/12/17  0659 01/12/17  0110   TROPONIN T ng/mL <0.010 <0.010       Results from last 7 days  Lab Units 01/16/17  0333 01/15/17  0341   WBC 10*3/mm3 10.00 12.74*   HEMOGLOBIN g/dL 15.3 14.0    HEMATOCRIT % 43.1 40.8   PLATELETS 10*3/mm3 151 142       Results from last 7 days  Lab Units 01/14/17  0329 01/13/17  1201   INR  1.26* 1.25*   APTT seconds  --  25.7       Results from last 7 days  Lab Units 01/14/17  0329 01/13/17  1529   MAGNESIUM mg/dL 2.4 2.4       Results from last 7 days  Lab Units 01/12/17  1953   CHOLESTEROL mg/dL 144   TRIGLYCERIDES mg/dL 165*   HDL CHOL mg/dL 29*   LDL CHOL mg/dL 82             I reviewed the patient's new clinical results.  I personally viewed and interpreted the patient's EKG  Current Medications:   Scheduled Meds:  aspirin 81 mg Oral Daily   atorvastatin 40 mg Oral Nightly   enoxaparin 40 mg Subcutaneous Q24H   guaiFENesin 1,200 mg Oral BID   insulin aspart 2-5 Units Subcutaneous 4x Daily AC & at Bedtime   metoprolol tartrate 25 mg Oral Q12H   mupirocin 1 application Each Nare Daily   pantoprazole 40 mg Oral Q AM   sennosides-docusate sodium 2 tablet Oral Nightly   SITagliptin 100 mg Oral Daily     Continuous Infusions:  diltiaZEM 5-15 mg/hr Last Rate: 10 mg/hr (01/16/17 0727)   niCARdipine 5-15 mg/hr Last Rate: Stopped (01/14/17 0755)       Allergies:  No Known Allergies    Assessment/Plan:     1. Multivessel CAD status post CABG: POD# 3  2.  Paroxysmal atrial flutter:  Back in sinus rhythm.   3. Hypertension: Currently controlled.   4. Diabetes mellitus  5. Hyperlipidemia: On medical therapy    -  D/c diltiazem drip  -  Start amiodarone to try and keep in sinus rhythm        Diandra Suarez MD  01/16/17  8:19 AM

## 2017-01-17 LAB
ANION GAP SERPL CALCULATED.3IONS-SCNC: 13.2 MMOL/L
BUN BLD-MCNC: 17 MG/DL (ref 6–20)
BUN/CREAT SERPL: 19.3 (ref 7–25)
CALCIUM SPEC-SCNC: 9.2 MG/DL (ref 8.6–10.5)
CHLORIDE SERPL-SCNC: 94 MMOL/L (ref 98–107)
CO2 SERPL-SCNC: 28.8 MMOL/L (ref 22–29)
CREAT BLD-MCNC: 0.88 MG/DL (ref 0.76–1.27)
DEPRECATED RDW RBC AUTO: 41.5 FL (ref 37–54)
ERYTHROCYTE [DISTWIDTH] IN BLOOD BY AUTOMATED COUNT: 11.6 % (ref 11.5–14.5)
GFR SERPL CREATININE-BSD FRML MDRD: 92 ML/MIN/1.73
GLUCOSE BLD-MCNC: 265 MG/DL (ref 65–99)
GLUCOSE BLDC GLUCOMTR-MCNC: 223 MG/DL (ref 70–130)
GLUCOSE BLDC GLUCOMTR-MCNC: 251 MG/DL (ref 70–130)
GLUCOSE BLDC GLUCOMTR-MCNC: 267 MG/DL (ref 70–130)
GLUCOSE BLDC GLUCOMTR-MCNC: 297 MG/DL (ref 70–130)
HCT VFR BLD AUTO: 39.9 % (ref 40.4–52.2)
HGB BLD-MCNC: 14.1 G/DL (ref 13.7–17.6)
MCH RBC QN AUTO: 34.6 PG (ref 27–32.7)
MCHC RBC AUTO-ENTMCNC: 35.3 G/DL (ref 32.6–36.4)
MCV RBC AUTO: 98 FL (ref 79.8–96.2)
PLATELET # BLD AUTO: 184 10*3/MM3 (ref 140–500)
PMV BLD AUTO: 10.1 FL (ref 6–12)
POTASSIUM BLD-SCNC: 3.7 MMOL/L (ref 3.5–5.2)
RBC # BLD AUTO: 4.07 10*6/MM3 (ref 4.6–6)
SODIUM BLD-SCNC: 136 MMOL/L (ref 136–145)
WBC NRBC COR # BLD: 7.66 10*3/MM3 (ref 4.5–10.7)

## 2017-01-17 PROCEDURE — 99024 POSTOP FOLLOW-UP VISIT: CPT | Performed by: NURSE PRACTITIONER

## 2017-01-17 PROCEDURE — 63710000001 INSULIN ASPART PER 5 UNITS: Performed by: INTERNAL MEDICINE

## 2017-01-17 PROCEDURE — 94799 UNLISTED PULMONARY SVC/PX: CPT

## 2017-01-17 PROCEDURE — 25010000002 ENOXAPARIN PER 10 MG: Performed by: NURSE PRACTITIONER

## 2017-01-17 PROCEDURE — 85027 COMPLETE CBC AUTOMATED: CPT | Performed by: NURSE PRACTITIONER

## 2017-01-17 PROCEDURE — 93010 ELECTROCARDIOGRAM REPORT: CPT | Performed by: INTERNAL MEDICINE

## 2017-01-17 PROCEDURE — 80048 BASIC METABOLIC PNL TOTAL CA: CPT | Performed by: NURSE PRACTITIONER

## 2017-01-17 PROCEDURE — 93005 ELECTROCARDIOGRAM TRACING: CPT | Performed by: NURSE PRACTITIONER

## 2017-01-17 PROCEDURE — 99233 SBSQ HOSP IP/OBS HIGH 50: CPT | Performed by: INTERNAL MEDICINE

## 2017-01-17 PROCEDURE — 82962 GLUCOSE BLOOD TEST: CPT

## 2017-01-17 PROCEDURE — 97110 THERAPEUTIC EXERCISES: CPT

## 2017-01-17 RX ADMIN — INSULIN ASPART 3 UNITS: 100 INJECTION, SOLUTION INTRAVENOUS; SUBCUTANEOUS at 17:10

## 2017-01-17 RX ADMIN — INSULIN ASPART 5 UNITS: 100 INJECTION, SOLUTION INTRAVENOUS; SUBCUTANEOUS at 12:49

## 2017-01-17 RX ADMIN — METOPROLOL SUCCINATE 25 MG: 25 TABLET, FILM COATED, EXTENDED RELEASE ORAL at 21:57

## 2017-01-17 RX ADMIN — OXYCODONE HYDROCHLORIDE 5 MG: 5 TABLET ORAL at 18:26

## 2017-01-17 RX ADMIN — GUAIFENESIN 1200 MG: 600 TABLET, EXTENDED RELEASE ORAL at 08:46

## 2017-01-17 RX ADMIN — SITAGLIPTIN 100 MG: 100 TABLET, FILM COATED ORAL at 08:46

## 2017-01-17 RX ADMIN — BISACODYL 10 MG: 10 SUPPOSITORY RECTAL at 12:50

## 2017-01-17 RX ADMIN — ASPIRIN 81 MG: 81 TABLET ORAL at 08:45

## 2017-01-17 RX ADMIN — OXYCODONE HYDROCHLORIDE 10 MG: 5 TABLET ORAL at 04:38

## 2017-01-17 RX ADMIN — METOPROLOL SUCCINATE 25 MG: 25 TABLET, FILM COATED, EXTENDED RELEASE ORAL at 08:46

## 2017-01-17 RX ADMIN — INSULIN ASPART 5 UNITS: 100 INJECTION, SOLUTION INTRAVENOUS; SUBCUTANEOUS at 17:10

## 2017-01-17 RX ADMIN — INSULIN ASPART 4 UNITS: 100 INJECTION, SOLUTION INTRAVENOUS; SUBCUTANEOUS at 21:58

## 2017-01-17 RX ADMIN — HYDROCODONE BITARTRATE AND ACETAMINOPHEN 1 TABLET: 5; 325 TABLET ORAL at 17:16

## 2017-01-17 RX ADMIN — PANTOPRAZOLE SODIUM 40 MG: 40 TABLET, DELAYED RELEASE ORAL at 06:00

## 2017-01-17 RX ADMIN — ENOXAPARIN SODIUM 40 MG: 40 INJECTION SUBCUTANEOUS at 17:10

## 2017-01-17 RX ADMIN — INSULIN ASPART 5 UNITS: 100 INJECTION, SOLUTION INTRAVENOUS; SUBCUTANEOUS at 08:46

## 2017-01-17 RX ADMIN — METFORMIN HYDROCHLORIDE 500 MG: 500 TABLET ORAL at 18:27

## 2017-01-17 RX ADMIN — MUPIROCIN 1 APPLICATION: 20 OINTMENT TOPICAL at 08:46

## 2017-01-17 RX ADMIN — NICOTINE 1 PATCH: 21 PATCH, EXTENDED RELEASE TRANSDERMAL at 17:17

## 2017-01-17 RX ADMIN — INSULIN ASPART 4 UNITS: 100 INJECTION, SOLUTION INTRAVENOUS; SUBCUTANEOUS at 08:47

## 2017-01-17 RX ADMIN — AMIODARONE HYDROCHLORIDE 400 MG: 200 TABLET ORAL at 08:46

## 2017-01-17 RX ADMIN — ACETAMINOPHEN 650 MG: 325 TABLET ORAL at 02:36

## 2017-01-17 RX ADMIN — DOCUSATE SODIUM -SENNOSIDES 2 TABLET: 50; 8.6 TABLET, COATED ORAL at 21:57

## 2017-01-17 RX ADMIN — INSULIN ASPART 4 UNITS: 100 INJECTION, SOLUTION INTRAVENOUS; SUBCUTANEOUS at 12:49

## 2017-01-17 RX ADMIN — AMIODARONE HYDROCHLORIDE 400 MG: 200 TABLET ORAL at 21:57

## 2017-01-17 RX ADMIN — GUAIFENESIN 1200 MG: 600 TABLET, EXTENDED RELEASE ORAL at 17:10

## 2017-01-17 RX ADMIN — INSULIN DETEMIR 10 UNITS: 100 INJECTION, SOLUTION SUBCUTANEOUS at 08:47

## 2017-01-17 RX ADMIN — ATORVASTATIN CALCIUM 40 MG: 40 TABLET, FILM COATED ORAL at 21:57

## 2017-01-17 RX ADMIN — OXYCODONE HYDROCHLORIDE 5 MG: 5 TABLET ORAL at 22:02

## 2017-01-17 NOTE — PROGRESS NOTES
"CC: Unstable angina.    Interval History:   Still with incisional pain no other major complaints.    Vital Signs  Temp:  [97.6 °F (36.4 °C)-98.3 °F (36.8 °C)] 97.6 °F (36.4 °C)  Heart Rate:  [58-90] 76  Resp:  [16-17] 16  BP: (122-153)/(72-85) 135/75    Intake/Output Summary (Last 24 hours) at 01/17/17 0657  Last data filed at 01/16/17 2120   Gross per 24 hour   Intake   1080 ml   Output      0 ml   Net   1080 ml     Flowsheet Rows         First Filed Value    Admission Height  73\" (185.4 cm) Documented at 01/12/2017 0102    Admission Weight  245 lb (111 kg) Documented at 01/12/2017 0102          PHYSICAL EXAM:  General: No acute distress  Resp:NL Rate, unlabored, clear  CV:NL rate and rhythm, NL PMI, Nl S1 and S2, no Mumur, no gallop, no rub, No JVD. Normal pedal pulses  ABD:Nl sounds, no masses or tenderness, nondistended, no quarding or rebound  Neuro: alert,cooperative and oriented  Extr: No edema or cyanosis, moves all extremities      Results Review:      Results from last 7 days  Lab Units 01/17/17  0610   SODIUM mmol/L 136   POTASSIUM mmol/L 3.7   CHLORIDE mmol/L 94*   TOTAL CO2 mmol/L 28.8   BUN mg/dL 17   CREATININE mg/dL 0.88   GLUCOSE mg/dL 265*   CALCIUM mg/dL 9.2       Results from last 7 days  Lab Units 01/12/17  0659 01/12/17  0110   TROPONIN T ng/mL <0.010 <0.010       Results from last 7 days  Lab Units 01/17/17  0610   WBC 10*3/mm3 7.66   HEMOGLOBIN g/dL 14.1   HEMATOCRIT % 39.9*   PLATELETS 10*3/mm3 184       Results from last 7 days  Lab Units 01/14/17  0329 01/13/17  1201   INR  1.26* 1.25*   APTT seconds  --  25.7       Results from last 7 days  Lab Units 01/12/17  1953   CHOLESTEROL mg/dL 144       Results from last 7 days  Lab Units 01/14/17  0329   MAGNESIUM mg/dL 2.4       Results from last 7 days  Lab Units 01/12/17  1953   CHOLESTEROL mg/dL 144   TRIGLYCERIDES mg/dL 165*   HDL CHOL mg/dL 29*     @LABRCNT(bnp)@  I reviewed the patient's new clinical results.  I personally viewed and " interpreted the patient's EKG/Telemetry data        Medication Review:   Meds reviewed      niCARdipine 5-15 mg/hr Last Rate: Stopped (01/14/17 4674)       Assessment/Plan  1.  Unstable angina.  Multivessel CAD status post CABG: Left internal mammary graft to proximal LAD. Sequential vein graft to acute marginal branch and right coronary artery. Vein graft first marginal branch of circumflex and separate graft to third marginal branch of circumflex. Vein graft sequentially to D1 and D2. Nl LV systlic function.  Doing well plan discharge in the morning.  2. Paroxysmal atrial flutter: Back in sinus rhythm.  Just stopped his diltiazem yesterday continue amiodarone if stable DC in the morning.  3. Hypertension: Currently controlled.   4. Diabetes mellitus  5. Hyperlipidemia: On medical therapy    Quincy Carolina MD  01/17/17  6:57 AM

## 2017-01-17 NOTE — PLAN OF CARE
Problem: Inpatient Physical Therapy  Goal: Cardiopulmonary Goal LTG- PT  Outcome: Outcome(s) achieved Date Met:  01/17/17 01/17/17 0945   Cardiopulmonary PT LTG   Cardiopulmonary PT LTG, Outcome goal met

## 2017-01-17 NOTE — THERAPY DISCHARGE NOTE
Acute Care - Physical Therapy Treatment Note/Discharge  [facility]     Patient Name: Oswald Savage  : 1970  MRN: [id number]  Today's Date: 2017  Onset of Illness/Injury or Date of Surgery Date: 17          Admit Date: 2017    Visit Dx:    ICD-10-CM ICD-9-CM   1. Chest pain at rest R07.9 786.50   2. Angina at rest I20.8 413.9   3. Generalized weakness R53.1 780.79     Patient Active Problem List   Diagnosis   • Chest pain at rest       Physical Therapy Education     Title: PT OT SLP Therapies (Resolved)     Topic: Physical Therapy (Resolved)     Point: Mobility training (Resolved)    Learning Progress Summary    Learner Readiness Method Response Comment Documented by Status   Patient Acceptance E,D VU   17 Done    Acceptance E,TB,D VU,NR   01/15/17 1002 Done    Acceptance E,D,TB VU,NR   17 0912 Done               Point: Home exercise program (Resolved)    Learning Progress Summary    Learner Readiness Method Response Comment Documented by Status   Patient Acceptance E,D VU  DM 17 0931 Done    Acceptance E,TB,D VU,NR   01/15/17 1002 Done               Point: Body mechanics (Resolved)    Learning Progress Summary    Learner Readiness Method Response Comment Documented by Status   Patient Acceptance E,D VU  DM 1731 Done    Acceptance E,TB,D VU,NR   01/15/17 1002 Done    Acceptance E,D,TB VU,NR   17 0912 Done               Point: Precautions (Resolved)    Learning Progress Summary    Learner Readiness Method Response Comment Documented by Status   Patient Acceptance E,D VU   17 0931 Done    Acceptance E,TB,D VU,NR   01/15/17 1002 Done    Acceptance E,D,TB VU,NR   17 0912 Done                      User Key     Initials Effective Dates Name Provider Type Discipline     05/18/15 -  Andrea Mcgrath, PTA Physical Therapy Assistant PT     12/01/15 -  Albina Bains, PT Physical Therapist PT                    IP PT Goals        01/17/17 0945 01/14/17 0913       Cardiopulmonary PT LTG    Cardiopulmonary PT LTG, Time to Achieve  1 wk  -CH     Cardiopulmonary PT LTG, Level  Level V  -CH     Cardiopulmonary PT LTG, Outcome goal met  -DM        User Key  (r) = Recorded By, (t) = Taken By, (c) = Cosigned By    Initials Name Provider Type    VINCENZO Mcgrath PTA Physical Therapy Assistant     Albina Bains, PT Physical Therapist              Adult Rehabilitation Note       01/17/17 0944 01/16/17 0929 01/15/17 0955    Rehab Assessment/Intervention    Discipline physical therapy assistant  -DM physical therapy assistant  -DM physical therapist  -CH    Document Type therapy note (daily note);discharge summary  -DM therapy note (daily note)  -DM therapy note (daily note)  -    Subjective Information agree to therapy  -DM agree to therapy  -DM agree to therapy;complains of;pain  -CH    Patient Effort, Rehab Treatment good  -DM good  -DM good  -CH    Symptoms Noted During/After Treatment   none  -CH    Precautions/Limitations cardiac precautions  -DM fall precautions;cardiac precautions;sternal precautions  -DM cardiac precautions;fall precautions;sternal precautions  -CH    Recorded by [DM] Andrea Mcgrath PTA [DM] Andrea Mcgrath PTA [CH] Albina Bains, PT    Pain Assessment    Pain Assessment No/denies pain  -DM 0-10  -DM 0-10  -CH    Pain Score  3  -DM 4  -CH    Pain Location  Chest  -DM Chest  -CH    Pain Intervention(s)  Repositioned  -DM Repositioned  -CH    Recorded by [DM] Andrea Mcgrath PTA [DM] Andrea Mcgrath PTA [CH] Albina Bains, PT    Cognitive Assessment/Intervention    Current Cognitive/Communication Assessment   functional  -    Orientation Status   oriented x 4  -CH    Follows Commands/Answers Questions   100% of the time  -    Personal Safety   WNL/WFL  -CH    Personal Safety Interventions   fall prevention program maintained;gait belt;nonskid shoes/slippers when out of bed  -CH     Recorded by   [CH] Albina Bains, PT    Bed Mobility, Assessment/Treatment    Bed Mob, Supine to Sit, [place] independent  -DM contact guard assist  -DM not tested  -CH    Bed Mob, Sit to Supine, [place] independent  -DM contact guard assist  -DM not tested  -CH    Recorded by [DM] Andrea Mcgrath PTA [DM] Andrea Mcgrath PTA [CH] Albina Bains, PT    Transfer Assessment/Treatment    Transfers, Sit-Stand [place] independent  -DM contact guard assist  -DM verbal cues required;nonverbal cues required (demo/gesture);2 person assist required;hand held assist;minimum assist (75% patient effort)  -    Transfers, Stand-Sit [place] independent  -DM contact guard assist  -DM verbal cues required;nonverbal cues required (demo/gesture);minimum assist (75% patient effort);2 person assist required;hand held assist  -CH    Recorded by [DM] Andrea Mcgrath PTA [DM] Andrea Mcgrath PTA [CH] Albina Bains, PT    Gait Assessment/Treatment    Gait, [place] Level conditional independence  -DM contact guard assist  -DM verbal cues required;nonverbal cues required (demo/gesture);contact guard assist;2 person assist required;hand held assist  -    Gait, Assistive Device --   No AD  -DM --   No AD  -DM     Gait, Distance (Feet) 300  -  -  -    Gait, Gait Deviations  brandy decreased;limb motion velocity decreased;step length decreased;stride length decreased;toe-to-floor clearance decreased  -DM brandy decreased;step length decreased;stride length decreased  -    Gait, Safety Issues  step length decreased  -DM     Recorded by [DM] Andrea Mcgrath PTA [DM] Andrea Mcgrath PTA [CH] Albina Bains, PT    Stairs Assessment/Treatment    Number of Stairs 12  -DM      Stairs, Handrail Location right side (ascending)  -      Stairs, [place] Level supervision required  -      Stairs, Technique Used step over step (ascending);step over step (descending)  -       Recorded by [DM] Andrea Mcgrath PTA      Therapy Exercises    Exercise Protocols --   cardiac  -DM --   cardiac  -DM --   5 reps cardiac protocol, level 3  -CH    Recorded by [VINCENZO] Andrea Mcgrath PTA [DM] Andrea Mcgrath PTA [CH] Albina Bains, ESTER    Positioning and Restraints    Pre-Treatment Position in bed  -DM in bed  -DM sitting in chair/recliner  -CH    Post Treatment Position bathroom  -DM bed   in chair earlier  -DM chair  -CH    In Bed call light within reach;encouraged to call for assist  -DM supine;call light within reach;encouraged to call for assist;with family/caregiver  -DM     In Chair   sitting;call light within reach;encouraged to call for assist;with family/caregiver  -CH    Recorded by [VINCENZO] Andrea Mcgrath PTA [DM] Andrea Mcgrath PTA [CH] Albina Bains PT      User Key  (r) = Recorded By, (t) = Taken By, (c) = Cosigned By    Initials Name Effective Dates    DM Andrea Mcgrath PTA 05/18/15 -     CH Albina Bains PT 12/01/15 -           PT Recommendation and Plan  Anticipated Discharge Disposition: home with home health  Planned Therapy Interventions: balance training, bed mobility training, gait training, home exercise program, patient/family education, ROM (Range of Motion), stair training, transfer training  PT Frequency: daily  Plan of Care Review  Plan Of Care Reviewed With: patient, spouse  Progress: improving  Outcome Summary/Follow up Plan: no falls, bed alarm on, spouse at bedside, pt complaining of pain at incision site on chest, oxy given PRN, tylenol given PRN, sinus rhythm, pt ambulated in the aguilar, possible D/C, continue to monitor           Outcome Measures       01/17/17 0900 01/16/17 0900 01/15/17 1000    How much help from another person do you currently need...    Turning from your back to your side while in flat bed without using bedrails? 4  -DM 3  -DM 3  -CH    Moving from lying on back to sitting on the side of a flat bed without bedrails?  4  -DM 3  -DM 3  -CH    Moving to and from a bed to a chair (including a wheelchair)? 4  -DM 3  -DM 3  -CH    Standing up from a chair using your arms (e.g., wheelchair, bedside chair)? 4  -DM 3  -DM 3  -CH    Climbing 3-5 steps with a railing? 4  -DM 2  -DM 2  -CH    To walk in hospital room? 4  -DM 3  -DM 3  -CH    AM-PAC 6 Clicks Score 24  -DM 17  -DM 17  -CH    Functional Assessment    Outcome Measure Options AM-PAC 6 Clicks Basic Mobility (PT)  -DM AM-PAC 6 Clicks Basic Mobility (PT)  -DM AM-PAC 6 Clicks Basic Mobility (PT)  -CH      User Key  (r) = Recorded By, (t) = Taken By, (c) = Cosigned By    Initials Name Provider Type    VINCENZO Mcgrath PTA Physical Therapy Assistant     Albina Bains, PT Physical Therapist           Time Calculation:         PT Charges       01/17/17 0946          Time Calculation    Start Time 0934  -DM      Stop Time 0946  -DM      Time Calculation (min) 12 min  -DM      PT Received On 01/17/17  -DM      PT - Next Appointment 11/11/11  -DM        User Key  (r) = Recorded By, (t) = Taken By, (c) = Cosigned By    Initials Name Provider Type    VINCENZO Mcgrath PTA Physical Therapy Assistant          Therapy Charges for Today     Code Description Service Date Service Provider Modifiers Qty    53589789116 HC PT THER PROC EA 15 MIN 1/16/2017 Andrea Mcgrath PTA GP 1    78827115275 HC PT THER PROC EA 15 MIN 1/17/2017 Andrea Mcgrath PTA GP 1          PT G-Codes  Outcome Measure Options: AM-PAC 6 Clicks Basic Mobility (PT)    PT Discharge Summary  Anticipated Discharge Disposition: home with home health    Andrea Mcgrath PTA  1/17/2017

## 2017-01-17 NOTE — PROGRESS NOTES
46 y.o.   LOS: 4 days   Patient Care Team:  Rose Marie Farr MD as PCP - General (Internal Medicine)    Chief Complaint:  Uncontrolled type 2 diabetes mellitus and postoperative management    Chief Complaint   Patient presents with   • Chest Pain     MIDSTERNAL CHEST BURNING, NO RADIATION. PT REPORTS MILD SOA. FELT GENERALIZED WEAKNESS AT ONSET. ONSET APROX 2350       Subjective     HPI  Patient tolerated the insulin regimen so far.  His blood sugars are in the 190-250 range  He denied any hypoglycemia  He has been ambulating better  He is also eating better.he him  He received some diabetes education so far.  He confirms that he could manage on insulin at home without any problem    Interval History:    Patient is a 46-year-old male from [place] with a past medical history of uncontrolled type 2 diabetes mellitus, hypertension, hyperlipidemia, tobacco abuse, coronary artery disease with prior angioplasty in 2008 admitted to the hospital with chest pain and further evaluation revealed that he needs coronary artery bypass surgery.  Patient underwent surgery yesterday and was on insulin drip until this morning when it was discontinued.  Patient is currently extubated and is not on IV insulin  He is not eating yet is very groggy and has received pain medication.  Most of the history was obtained from chart review.  As per the nurse patient will advance to maybe clear liquids this evening     Patient is very groggy and could not provide much information  Home med list reviewed and he is on Janumet and Amaryl at home.  Compliance is under question  It is also unclear if the patient has any diabetes related complications.  Review of Systems:      Review of Systems   Constitutional: Positive for fatigue.   Respiratory: Positive for chest tightness. Negative for shortness of breath.    Cardiovascular: Negative for chest pain.   Gastrointestinal: Negative for abdominal distention and nausea.   All other systems reviewed  and are negative.    Objective     Vital Signs   Temp:  [97.6 °F (36.4 °C)-98.4 °F (36.9 °C)] 98.4 °F (36.9 °C)  Heart Rate:  [74-90] 86  Resp:  [16] 16  BP: (122-166)/(75-95) 166/95    Physical Exam:  Physical Exam   Constitutional: He is oriented to person, place, and time. He appears well-developed and well-nourished.   obese   HENT:   Head: Normocephalic and atraumatic.   Eyes: EOM are normal. Pupils are equal, round, and reactive to light.   Neck: Normal range of motion. Neck supple. No thyromegaly present.   Cardiovascular: Normal rate, regular rhythm, normal heart sounds and intact distal pulses.    Pulmonary/Chest: Effort normal and breath sounds normal. He has no wheezes. He has no rales.   Abdominal: Soft. Bowel sounds are normal. He exhibits distension. There is no tenderness.   Musculoskeletal: Normal range of motion. He exhibits no edema.   Neurological: He is alert and oriented to person, place, and time.   Skin: Skin is warm and dry.   Psychiatric: He has a normal mood and affect. His behavior is normal.   Nursing note and vitals reviewed.  Results Review:     I reviewed the patient's new clinical results.      GLUCOSE   Date/Time Value Ref Range Status   01/17/2017 0610 265 (H) 65 - 99 mg/dL Final   01/16/2017 0333 251 (H) 65 - 99 mg/dL Final   01/15/2017 0341 185 (H) 65 - 99 mg/dL Final     Lab Results (last 72 hours)     Procedure Component Value Units Date/Time    POC Glucose Fingerstick [[id number]]  (Abnormal) Collected:  01/14/17 1926    Specimen:  Blood Updated:  01/14/17 1928     Glucose 192 (H) mg/dL     Narrative:       Meter: [id number] : [id number] Robert Posada    CBC (No Diff) [[id number]]  (Abnormal) Collected:  01/15/17 0341    Specimen:  Blood Updated:  01/15/17 0424     WBC 12.74 (H) 10*3/mm3      RBC 4.05 (L) 10*6/mm3      Hemoglobin 14.0 g/dL      Hematocrit 40.8 %      .7 (H) fL      MCH 34.6 (H) pg      MCHC 34.3 g/dL      RDW 11.9 %      RDW-SD 43.4 fl      MPV 10.8 fL       Platelets 142 10*3/mm3     Basic Metabolic Panel [[id number]]  (Abnormal) Collected:  01/15/17 0341    Specimen:  Blood Updated:  01/15/17 0435     Glucose 185 (H) mg/dL      BUN 11 mg/dL      Creatinine 0.84 mg/dL      Sodium 138 mmol/L      Potassium 4.4 mmol/L      Chloride 98 mmol/L      CO2 24.3 mmol/L      Calcium 9.4 mg/dL      eGFR Non African Amer 98 mL/min/1.73      BUN/Creatinine Ratio 13.1      Anion Gap 15.7 mmol/L     Narrative:       GFR Normal >60  Chronic Kidney Disease <60  Kidney Failure <15    POC Glucose Fingerstick [[id number]]  (Abnormal) Collected:  01/15/17 0517    Specimen:  Blood Updated:  01/15/17 0518     Glucose 155 (H) mg/dL     Narrative:       Meter: [id number] : [id number] Robert Posada    POC Glucose Fingerstick [[id number]]  (Abnormal) Collected:  01/15/17 1035    Specimen:  Blood Updated:  01/15/17 1036     Glucose 214 (H) mg/dL     Narrative:       Meter: [id number] : [id number] Libby SHEFFIELD    POC Glucose Fingerstick [[id number]]  (Abnormal) Collected:  01/15/17 1551    Specimen:  Blood Updated:  01/15/17 1552     Glucose 214 (H) mg/dL     Narrative:       Meter: [id number] : [id number] Libby SHEFFIELD    POC Glucose Fingerstick [[id number]]  (Abnormal) Collected:  01/15/17 1932    Specimen:  Blood Updated:  01/15/17 1933     Glucose 227 (H) mg/dL     Narrative:       Meter: [id number] : [id number] Robert Posada    CBC (No Diff) [[id number]]  (Abnormal) Collected:  01/16/17 0333    Specimen:  Blood Updated:  01/16/17 0413     WBC 10.00 10*3/mm3      RBC 4.43 (L) 10*6/mm3      Hemoglobin 15.3 g/dL      Hematocrit 43.1 %      MCV 97.3 (H) fL      MCH 34.5 (H) pg      MCHC 35.5 g/dL      RDW 11.6 %      RDW-SD 41.2 fl      MPV 10.8 fL      Platelets 151 10*3/mm3     Basic Metabolic Panel [[id number]]  (Abnormal) Collected:  01/16/17 0333    Specimen:  Blood Updated:  01/16/17 0434     Glucose 251 (H) mg/dL      BUN 14 mg/dL      Creatinine 0.80 mg/dL       Sodium 137 mmol/L      Potassium 4.2 mmol/L      Chloride 95 (L) mmol/L      CO2 24.7 mmol/L      Calcium 9.6 mg/dL      eGFR Non African Amer 103 mL/min/1.73      BUN/Creatinine Ratio 17.5      Anion Gap 17.3 mmol/L     Narrative:       GFR Normal >60  Chronic Kidney Disease <60  Kidney Failure <15    POC Glucose Fingerstick [[id number]]  (Abnormal) Collected:  01/16/17 0551    Specimen:  Blood Updated:  01/16/17 0552     Glucose 244 (H) mg/dL     Narrative:       Meter: [id number] : [id number] Robert Posada    POC Glucose Fingerstick [[id number]]  (Abnormal) Collected:  01/16/17 1105    Specimen:  Blood Updated:  01/16/17 1107     Glucose 306 (H) mg/dL     Narrative:       Meter: [id number] : [id number] Kush Ceja    POC Glucose Fingerstick [[id number]]  (Abnormal) Collected:  01/16/17 1623    Specimen:  Blood Updated:  01/16/17 1626     Glucose 258 (H) mg/dL     Narrative:       Meter: [id number] : [id number] Kush Ceja    POC Glucose Fingerstick [[id number]]  (Abnormal) Collected:  01/16/17 1942    Specimen:  Blood Updated:  01/16/17 1945     Glucose 302 (H) mg/dL     Narrative:       Meter: [id number] : [id number] Katelin Pulido    POC Glucose Fingerstick [[id number]]  (Abnormal) Collected:  01/17/17 0546    Specimen:  Blood Updated:  01/17/17 0547     Glucose 267 (H) mg/dL     Narrative:       Meter: [id number] : [id number] White Tess    CBC (No Diff) [[id number]]  (Abnormal) Collected:  01/17/17 0610    Specimen:  Blood Updated:  01/17/17 0623     WBC 7.66 10*3/mm3      RBC 4.07 (L) 10*6/mm3      Hemoglobin 14.1 g/dL      Hematocrit 39.9 (L) %      MCV 98.0 (H) fL      MCH 34.6 (H) pg      MCHC 35.3 g/dL      RDW 11.6 %      RDW-SD 41.5 fl      MPV 10.1 fL      Platelets 184 10*3/mm3     Basic Metabolic Panel [[id number]]  (Abnormal) Collected:  01/17/17 0610    Specimen:  Blood Updated:  01/17/17 0641     Glucose 265 (H) mg/dL      BUN 17 mg/dL      Creatinine 0.88 mg/dL      Sodium 136 mmol/L       Potassium 3.7 mmol/L      Chloride 94 (L) mmol/L      CO2 28.8 mmol/L      Calcium 9.2 mg/dL      eGFR Non African Amer 92 mL/min/1.73      BUN/Creatinine Ratio 19.3      Anion Gap 13.2 mmol/L     Narrative:       GFR Normal >60  Chronic Kidney Disease <60  Kidney Failure <15    POC Glucose Fingerstick [[id number]]  (Abnormal) Collected:  01/17/17 1045    Specimen:  Blood Updated:  01/17/17 1046     Glucose 297 (H) mg/dL     Narrative:       Meter: [id number] : [id number] SulfurCell    POC Glucose Fingerstick [[id number]]  (Abnormal) Collected:  01/17/17 1537    Specimen:  Blood Updated:  01/17/17 1538     Glucose 223 (H) mg/dL     Narrative:       Meter: [id number] : [id number] SulfurCell        Imaging Results (last 72 hours)     Procedure Component Value Units Date/Time    XR Chest 1 View [[id number]] Collected:  01/15/17 0802     Updated:  01/15/17 1543    Narrative:       AP CHEST 01/15/2017 AT 0450 HOURS     HISTORY: Postop cardiac surgery.     FINDINGS: The heart size is at the upper limits of normal. Lungs are  underinflated with some vascular crowding. There is some probable  atelectasis in the left perihilar region and left lung base. Bilateral  chest tubes are seen and unchanged in position from yesterday's study.     No pneumothorax is seen.     Sternotomy wires are seen.     The right side central venous catheter has been removed since the  previous study.     Overall the chest appears largely unchanged from 01/14/2017.     This report was finalized on 1/15/2017 3:40 PM by Dr. Percy Gallardo MD.       XR Chest 1 View [[id number]] Collected:  01/15/17 1347     Updated:  01/15/17 1553    Narrative:       AP CHEST 01/15/2017     HISTORY: Chest tube removal.     FINDINGS: There is no evidence of pneumothorax following the bilateral  chest tube removal. There are some patchy areas of mild probable  atelectasis in both lung bases. Heart size is mildly enlarged.  Sternotomy wires are seen.  Lungs are underinflated.       Impression:       No evidence of pneumothorax following the chest tube  removal.     This report was finalized on 1/15/2017 3:50 PM by Dr. Percy Gallardo MD.       XR Chest PA & Lateral [[id number]] Collected:  01/16/17 1843     Updated:  01/16/17 1846    Narrative:       TWO-VIEW CHEST     HISTORY: 46-year-old male postop CABG     COMPARISON: 01/15/2017     FINDINGS:  1. Persistent bilateral atelectasis with improvement in aeration at the  lung bases since prior study.  2. No new abnormality, no other change.     This report was finalized on 1/16/2017 6:43 PM by Dr. Emigdio Marcos MD.             Medication Review:       Current Facility-Administered Medications:   •  acetaminophen (TYLENOL) tablet 650 mg, 650 mg, Oral, Q4H PRN, Tita A Jorgensen, APRN, 650 mg at 01/17/17 0236  •  ALPRAZolam (XANAX) tablet 0.25 mg, 0.25 mg, Oral, Q8H PRN, Tita A Jorgensen, APRN, 0.25 mg at 01/13/17 2058  •  amiodarone (PACERONE) tablet 400 mg, 400 mg, Oral, Q12H, Diandra Suarez MD, 400 mg at 01/17/17 0846  •  aspirin EC tablet 81 mg, 81 mg, Oral, Daily, Tita A Jorgensen, APRN, 81 mg at 01/17/17 0845  •  atorvastatin (LIPITOR) tablet 40 mg, 40 mg, Oral, Nightly, Quincy Carolina MD, 40 mg at 01/16/17 2122  •  bisacodyl (DULCOLAX) EC tablet 10 mg, 10 mg, Oral, Daily PRN, Tita A Jorgensen, APRN  •  bisacodyl (DULCOLAX) suppository 10 mg, 10 mg, Rectal, Daily PRN, Tita A Jorgensen, APRN, 10 mg at 01/17/17 1250  •  cyclobenzaprine (FLEXERIL) tablet 10 mg, 10 mg, Oral, Q8H PRN, Tita A Jorgensen, APRN, 10 mg at 01/15/17 0226  •  dextrose (D50W) solution 25 g, 25 g, Intravenous, Q15 Min PRN, Alexandre Farrell MD  •  dextrose (GLUTOSE) oral gel 15 g, 15 g, Oral, Q15 Min PRN, Alexandre Farrell MD  •  enoxaparin (LOVENOX) syringe 40 mg, 40 mg, Subcutaneous, Q24H, Tita Jorgensen, APRN, 40 mg at 01/16/17 9954  •  glucagon (human recombinant) (GLUCAGEN DIAGNOSTIC) injection 1 mg, 1 mg, Subcutaneous, Q15 Min PRN, Alexandre GEORGES  MD Bud  •  guaiFENesin (MUCINEX) 12 hr tablet 1,200 mg, 1,200 mg, Oral, BID, Alexandre Farrell MD, 1,200 mg at 17 0846  •  HYDROcodone-acetaminophen (NORCO) 5-325 MG per tablet 2 tablet, 2 tablet, Oral, Q4H PRN, Tita Jorgensen, APRN, 2 tablet at 17 1350  •  Influenza Vac Subunit Quad (FLUCELVAX) injection 0.5 mL, 0.5 mL, Intramuscular, During Hospitalization, Kaiser Abdi MD  •  insulin aspart (novoLOG) injection 2-5 Units, 2-5 Units, Subcutaneous, 4x Daily AC & at Bedtime, Basilio PETIT MD, 4 Units at 17 1249  •  insulin aspart (novoLOG) injection 5 Units, 5 Units, Subcutaneous, TID With Meals, Basilio PETIT MD, 5 Units at 17 1249  •  insulin detemir (LEVEMIR) injection 10 Units, 10 Units, Subcutaneous, Q12H, Basilio PETIT MD, 10 Units at 17 0847  •  magnesium hydroxide (MILK OF MAGNESIA) suspension 2400 mg/10mL 10 mL, 10 mL, Oral, Daily PRN, Tita Jorgensen, APRN, 10 mL at 17 1805  •  metoprolol succinate XL (TOPROL-XL) 24 hr tablet 25 mg, 25 mg, Oral, Q12H, Diandra Suarez MD, 25 mg at 17 0846  •  morphine injection 4 mg, 4 mg, Intravenous, Q2H PRN, 4 mg at 17 1932 **AND** naloxone (NARCAN) injection 0.4 mg, 0.4 mg, Intravenous, Q5 Min PRN, Tita Jorgensen, APRN  •  mupirocin (BACTROBAN) 2 % nasal ointment 1 application, 1 application, Each Nare, Daily, Tita Jorgensen, APRN, 1 application at 17 0846  •  [] morphine injection 4 mg, 4 mg, Intravenous, Q15 Min PRN, 4 mg at 17 1836 **AND** naloxone (NARCAN) injection 0.4 mg, 0.4 mg, Intravenous, Q5 Min PRN, PATRICA Solis  •  niCARdipine (CARDENE) 25 mg/250 mL 0.9% NS VTB (mbp), 5-15 mg/hr, Intravenous, Continuous PRN, PATRICA Solis, Stopped at 17 3125  •  nicotine (NICODERM CQ) 21 MG/24HR patch 1 patch, 1 patch, Transdermal, Daily, PATRICA Solis, 1 patch at 17 8925  •  ondansetron (ZOFRAN) tablet 4 mg, 4 mg, Oral, Q6H PRN **OR**  ondansetron ODT (ZOFRAN-ODT) disintegrating tablet 4 mg, 4 mg, Oral, Q6H PRN **OR** ondansetron (ZOFRAN) injection 4 mg, 4 mg, Intravenous, Q6H PRN, Diandra Suarez MD, 4 mg at 01/13/17 1047  •  ondansetron (ZOFRAN) injection 4 mg, 4 mg, Intravenous, Q6H PRN, Tita A Jorgensen, APRN  •  oxyCODONE (ROXICODONE) immediate release tablet 10 mg, 10 mg, Oral, Q4H PRN, Tita A Jorgensen, APRN, 10 mg at 01/17/17 0438  •  [DISCONTINUED] pantoprazole (PROTONIX) injection 40 mg, 40 mg, Intravenous, Q AM **OR** pantoprazole (PROTONIX) EC tablet 40 mg, 40 mg, Oral, Q AM, Tita A Jorgensen, APRN, 40 mg at 01/17/17 0600  •  potassium chloride (MICRO-K) CR capsule 40 mEq, 40 mEq, Oral, PRN **OR** potassium chloride (KLOR-CON) packet 40 mEq, 40 mEq, Oral, PRN, Tita A Jorgensen, APRN  •  potassium chloride (MICRO-K) CR capsule 40 mEq, 40 mEq, Oral, PRN **OR** potassium chloride (KLOR-CON) packet 40 mEq, 40 mEq, Oral, PRN **OR** potassium chloride 10 mEq in 100 mL IVPB, 10 mEq, Intravenous, Q1H PRN, Alexandre Farrell MD  •  potassium chloride 10 mEq in 100 mL IVPB, 10 mEq, Intravenous, Q1H PRN **OR** potassium chloride 10 mEq in 100 mL IVPB, 10 mEq, Intravenous, Q1H PRN, Tita A Jorgensen, APRN  •  [DISCONTINUED] promethazine (PHENERGAN) tablet 12.5 mg, 12.5 mg, Oral, Q6H PRN **OR** promethazine (PHENERGAN) injection 12.5 mg, 12.5 mg, Intravenous, Q6H PRN, Tita A Jorgensen, APRN  •  sennosides-docusate sodium (SENOKOT-S) 8.6-50 MG tablet 2 tablet, 2 tablet, Oral, Nightly, Tita Jorgensen, APRN, 2 tablet at 01/16/17 2122  •  SITagliptin (JANUVIA) tablet 100 mg, 100 mg, Oral, Daily, Basilio PETIT MD, 100 mg at 01/17/17 0846  •  sodium chloride 0.9 % flush 1-10 mL, 1-10 mL, Intravenous, PRN, Diandra Suarez MD  •  temazepam (RESTORIL) capsule 15 mg, 15 mg, Oral, Nightly PRN, Alexandre Farrell MD, 15 mg at 01/12/17 2218    Assessment/Plan     Patient Active Problem List   Diagnosis   • Chest pain at rest   uncontrolled type 2 diabetes mellitus  Status  post coronary artery bypass grafting postop day 2  Obesity  Hyperlipidemia  Hypertension      Hemoglobin A1c 7.2%  Triglycerides are elevated.     Patient is concerned about the basal and bolus insulin regimen at home  I advised him that I will decrease the Levemir to once daily-he will received 20 units of Levemir at night starting tonight  I will discontinue the NovoLog before each meal him at home as well as a sliding scale  I will restart Janumet at the time of discharge  I would recommend not to restart Amaryl    Patient verbalized understanding     The total time spent for old record and lab review and face- to- face was more than 35 min of which greater than 50% of time was spent on counseling the patient on recommended evaluation and treatment options, instructions for management/treatment and /or follow up  and importance of compliance with chosen management or treatment options        Basilio Sharp MD FACE.  01/17/17  4:50 PM      EMR Dragon / transcription disclaimer:    Much of this encounter note is an electronic transcription/ translation of spoken language to printed text.  Electronic translation of spoken language may permit erroneous, or at times, nonsensical words or phrases to be inadvertently transcribed; although I have reviewed the note for such errors, some may still exist.

## 2017-01-17 NOTE — PROGRESS NOTES
" LOS: 4 days   Patient Care Team:  Rose Marie Farr MD as PCP - General (Internal Medicine)    Chief Complaint: F/u CABG    Subjective     Resting in bed. Denies CP or SOA.Tele demonstrates SR.    Vital Signs  Temp:  [97.6 °F (36.4 °C)-98.4 °F (36.9 °C)] 98.4 °F (36.9 °C)  Heart Rate:  [69-90] 74  Resp:  [16-17] 16  BP: (122-153)/(75-85) 143/79  Body mass index is 30.64 kg/(m^2).    Intake/Output Summary (Last 24 hours) at 01/17/17 1143  Last data filed at 01/17/17 0836   Gross per 24 hour   Intake   1320 ml   Output      0 ml   Net   1320 ml     I/O this shift:  In: 360 [P.O.:360]  Out: -       Last 3 weights    01/15/17  0646 01/16/17  0500 01/17/17  0517   Weight: 234 lb 9.6 oz (106 kg) 233 lb 6.4 oz (106 kg) 232 lb 3.2 oz (105 kg)         Objective:  General Appearance:  Comfortable and in no acute distress.    Vital signs: (most recent): Blood pressure 143/79, pulse 74, temperature 98.4 °F (36.9 °C), temperature source Oral, resp. rate 16, height 73\" (185.4 cm), weight 232 lb 3.2 oz (105 kg), SpO2 96 %.  Vital signs are normal.    Lungs:  Normal respiratory rate and normal effort.  Breath sounds clear to auscultation.    Heart: Normal rate.  Regular rhythm.  S1 normal and S2 normal.  (Tele: SR)  Chest: Symmetric chest wall expansion.   Abdomen: Abdomen is soft.  There is no abdominal tenderness.     Extremities: Normal range of motion.    Pulses: Distal pulses are intact.    Neurological: Patient is alert and oriented to person, place and time.    Skin:  Warm and dry.  (Incision wnl)            Results Review:        WBC WBC   Date Value Ref Range Status   01/17/2017 7.66 4.50 - 10.70 10*3/mm3 Final   01/16/2017 10.00 4.50 - 10.70 10*3/mm3 Final   01/15/2017 12.74 (H) 4.50 - 10.70 10*3/mm3 Final      HGB HEMOGLOBIN   Date Value Ref Range Status   01/17/2017 14.1 13.7 - 17.6 g/dL Final   01/16/2017 15.3 13.7 - 17.6 g/dL Final   01/15/2017 14.0 13.7 - 17.6 g/dL Final      HCT HEMATOCRIT   Date Value Ref Range " Status   01/17/2017 39.9 (L) 40.4 - 52.2 % Final   01/16/2017 43.1 40.4 - 52.2 % Final   01/15/2017 40.8 40.4 - 52.2 % Final      Platelets PLATELETS   Date Value Ref Range Status   01/17/2017 184 140 - 500 10*3/mm3 Final   01/16/2017 151 140 - 500 10*3/mm3 Final   01/15/2017 142 140 - 500 10*3/mm3 Final        PT/INR:    No results found for: PROTIME/  No results found for: INR    Sodium SODIUM   Date Value Ref Range Status   01/17/2017 136 136 - 145 mmol/L Final   01/16/2017 137 136 - 145 mmol/L Final   01/15/2017 138 136 - 145 mmol/L Final      Potassium POTASSIUM   Date Value Ref Range Status   01/17/2017 3.7 3.5 - 5.2 mmol/L Final   01/16/2017 4.2 3.5 - 5.2 mmol/L Final   01/15/2017 4.4 3.5 - 5.2 mmol/L Final      Chloride CHLORIDE   Date Value Ref Range Status   01/17/2017 94 (L) 98 - 107 mmol/L Final   01/16/2017 95 (L) 98 - 107 mmol/L Final   01/15/2017 98 98 - 107 mmol/L Final      Bicarbonate CO2   Date Value Ref Range Status   01/17/2017 28.8 22.0 - 29.0 mmol/L Final   01/16/2017 24.7 22.0 - 29.0 mmol/L Final   01/15/2017 24.3 22.0 - 29.0 mmol/L Final      BUN BUN   Date Value Ref Range Status   01/17/2017 17 6 - 20 mg/dL Final   01/16/2017 14 6 - 20 mg/dL Final   01/15/2017 11 6 - 20 mg/dL Final      Creatinine CREATININE   Date Value Ref Range Status   01/17/2017 0.88 0.76 - 1.27 mg/dL Final   01/16/2017 0.80 0.76 - 1.27 mg/dL Final   01/15/2017 0.84 0.76 - 1.27 mg/dL Final      Calcium CALCIUM   Date Value Ref Range Status   01/17/2017 9.2 8.6 - 10.5 mg/dL Final   01/16/2017 9.6 8.6 - 10.5 mg/dL Final   01/15/2017 9.4 8.6 - 10.5 mg/dL Final      Magnesium No results found for: MG         amiodarone 400 mg Oral Q12H   aspirin 81 mg Oral Daily   atorvastatin 40 mg Oral Nightly   enoxaparin 40 mg Subcutaneous Q24H   guaiFENesin 1,200 mg Oral BID   insulin aspart 2-5 Units Subcutaneous 4x Daily AC & at Bedtime   insulin aspart 5 Units Subcutaneous TID With Meals   insulin detemir 10 Units Subcutaneous Q12H    metoprolol succinate XL 25 mg Oral Q12H   mupirocin 1 application Each Nare Daily   nicotine 1 patch Transdermal Daily   pantoprazole 40 mg Oral Q AM   sennosides-docusate sodium 2 tablet Oral Nightly   SITagliptin 100 mg Oral Daily       niCARdipine 5-15 mg/hr Last Rate: Stopped (01/14/17 6605)           Patient Active Problem List   Diagnosis Code   • Chest pain at rest R07.9       Assessment & Plan     -Severe multivessel CAD w/preserved LV systolic function, EF 55%  -Hx of CAD with PCI in the past  -POD#4 CABG x7 w/LIMA   -Expected post op acute blood loss anemia, stable  -Thrombocytopenia, resolved  -HTN-stable  -HLD, treated  -DM, treated   -Postop PAFL, now in SR, amio per cardiology    Mobilize  Encourage IS  Likely home tomorrow?      Deborah Guillen, APRN  01/17/17  11:43 AM

## 2017-01-17 NOTE — PROGRESS NOTES
"      PROGRESS NOTE   LOS: 4 days   Patient Care Team:  Rose Marie Farr MD as PCP - General (Internal Medicine)    Chief Complaint: No respiratory complaint on room air for 2 days    Interval History: Patient is status post 7 vessel CABG and he  was reported to have snoring and was having some desaturation at night and was evaluated for sleep apnea with initial screening test was an overnight oximetry that was reviewed and summarized below.  He denies any hypersomnia as he does have some dry mouth at night, he is agreeable for outpatient evaluation with a home sleep study.he is ambulating on room air and denies any chest discomfort or tightness or anything beyond the typical pain after open heart surgery, no retained secretion is able to expectorate.  REVIEW OF SYSTEMS:   No shortness of breath on room air no chest pain beyond typical post CABG no GI or  complaints.         Vital Signs  Temp:  [97.6 °F (36.4 °C)-98.4 °F (36.9 °C)] 98.4 °F (36.9 °C)  Heart Rate:  [74-90] 74  Resp:  [16-17] 16  BP: (122-153)/(75-85) 143/79    Intake/Output Summary (Last 24 hours) at 01/17/17 1249  Last data filed at 01/17/17 1227   Gross per 24 hour   Intake   1680 ml   Output      0 ml   Net   1680 ml     Flowsheet Rows         First Filed Value    Admission Height  73\" (185.4 cm) Documented at 01/12/2017 0102    Admission Weight  245 lb (111 kg) Documented at 01/12/2017 0102          Physical Exam:   General Appearance:    Alert, cooperative, in no acute distress   Lungs:     Clear to auscultation,respirations regular, even and                  unlabored, decreased over the bases but no crackles     Heart:    Regular rhythm and normal rate, normal S1 and S2, no            murmur, no gallop, no rub, no click   Chest Wall:    No abnormalities observed   Abdomen:     Normal bowel sounds, no masses, no organomegaly, soft        non-tender, non-distended, no guarding, no rebound                tenderness   Extremities:   Moves all " extremities well, no edema, no cyanosis, no             redness  Mallampati 4 airways was enlarged uvula      Results Review:        Results from last 7 days  Lab Units 01/17/17  0610 01/16/17  0333 01/15/17  0341 01/14/17  0329 01/13/17  1944 01/13/17  1529 01/13/17  1200 01/13/17  0349   SODIUM mmol/L 136 137 138 145  --  145 143 139   POTASSIUM mmol/L 3.7 4.2 4.4 4.3 4.8 4.3 4.0 4.2   CHLORIDE mmol/L 94* 95* 98 103  --  107 104 101   TOTAL CO2 mmol/L 28.8 24.7 24.3 26.3  --  23.6 25.7 24.3   BUN mg/dL 17 14 11 12  --  11 11 11   CREATININE mg/dL 0.88 0.80 0.84 1.02  --  1.07 1.24 0.93   CALCIUM mg/dL 9.2 9.6 9.4 9.1  --  9.1 9.3 9.0       Results from last 7 days  Lab Units 01/12/17  0659 01/12/17  0110   TROPONIN T ng/mL <0.010 <0.010       Results from last 7 days  Lab Units 01/17/17  0610 01/16/17  0333 01/15/17  0341 01/14/17  0329 01/13/17  1529 01/13/17  1201 01/13/17  1059  01/12/17  1953   WBC 10*3/mm3 7.66 10.00 12.74* 11.02* 10.80* 8.75  --   --  7.22   HEMOGLOBIN g/dL 14.1 15.3 14.0 13.4* 14.3 14.6  --   --  15.7   HEMOGLOBIN, ARTERIAL POC g/dL  --   --   --   --   --   --  11.6*  < >  --    HEMATOCRIT % 39.9* 43.1 40.8 40.1* 39.8* 41.2  --   --  45.5   HEMATOCRIT POC %  --   --   --   --   --   --  34*  < >  --    PLATELETS 10*3/mm3 184 151 142 128* 117* 113*  --   --  196   < > = values in this interval not displayed.    Results from last 7 days  Lab Units 01/14/17  0329 01/13/17  1201   INR  1.26* 1.25*   APTT seconds  --  25.7       Results from last 7 days  Lab Units 01/14/17  0329   MAGNESIUM mg/dL 2.4       Results from last 7 days  Lab Units 01/12/17  1953   CHOLESTEROL mg/dL 144   TRIGLYCERIDES mg/dL 165*   HDL CHOL mg/dL 29*       Results from last 7 days  Lab Units 01/13/17  1747 01/13/17  1528 01/13/17  1227 01/13/17  1059 01/13/17  1034 01/13/17  1003 01/13/17  0943  01/13/17  0414   PH, ARTERIAL pH units 7.330* 7.403 7.357 7.34* 7.37 7.38 7.30*  < > 7.407   PO2 ART mm Hg 112.9* 116.7*  253.3*  --   --   --   --   --  77.3*   PCO2, ARTERIAL mm Hg 49.2* 41.9 44.7  --   --   --   --   --  43.0   HCO3 ART mmol/L 26.0 26.1 25.0  --   --   --   --   --  27.1   < > = values in this interval not displayed.    Results from last 7 days  Lab Units 01/12/17  1953   HEMOGLOBIN A1C % 7.20*     GLUCOSE   Date/Time Value Ref Range Status   01/17/2017 1045 297 (H) 70 - 130 mg/dL Final   01/17/2017 0546 267 (H) 70 - 130 mg/dL Final   01/16/2017 1942 302 (H) 70 - 130 mg/dL Final   01/16/2017 1623 258 (H) 70 - 130 mg/dL Final   01/16/2017 1105 306 (H) 70 - 130 mg/dL Final   01/16/2017 0551 244 (H) 70 - 130 mg/dL Final   01/15/2017 1932 227 (H) 70 - 130 mg/dL Final   01/15/2017 1551 214 (H) 70 - 130 mg/dL Final          Overnight oximetry was positive for sleep related hypoxemia with desaturation that can be suggestive of obstructive sleep apnea.          Medication Review:     amiodarone 400 mg Oral Q12H   aspirin 81 mg Oral Daily   atorvastatin 40 mg Oral Nightly   enoxaparin 40 mg Subcutaneous Q24H   guaiFENesin 1,200 mg Oral BID   insulin aspart 2-5 Units Subcutaneous 4x Daily AC & at Bedtime   insulin aspart 5 Units Subcutaneous TID With Meals   insulin detemir 10 Units Subcutaneous Q12H   metoprolol succinate XL 25 mg Oral Q12H   mupirocin 1 application Each Nare Daily   nicotine 1 patch Transdermal Daily   pantoprazole 40 mg Oral Q AM   sennosides-docusate sodium 2 tablet Oral Nightly   SITagliptin 100 mg Oral Daily         niCARdipine 5-15 mg/hr Last Rate: Stopped (01/14/17 0755)       ASSESSMENT:   1. Coronary artery disease status post CABGx7 on this admission and previous history of angioplasty in 2007  2. Obstructive sleep apnea suspected based on his overnight oximetry for further confirmation with an outpatient sleep study  3. No significant daytime fatigue or hypersomnia however patient has obesity and narrow airway on physical exam  4. Obesity  5. Diabetes mellitus  6. Systemic hypertension  7. Sleep  related hypoxemia based on the overnight oximetry expect that to improve as his pulmonary function continues to recover postoperatively    PLAN:  Cleared for discharge from the pulmonary standpoint with the need to schedule a home sleep study after discharge patient is to call my office at [phone] and I will help him with the arrangement at the time of discharge  We will check exercise oximetry on room air in the morning and will follow-up on the rest as an outpatient.  Discussed the treatment plan with the patient he is agreeable.    Jun Crews MD  01/17/17  12:49 PM      EMR Dragon/Transcription disclaimer:   Much of this encounter note is an electronic transcription/translation of spoken language to printed text. The electronic translation of spoken language may permit erroneous, or at times, nonsensical words or phrases to be inadvertently transcribed; Although I have reviewed the note for such errors, some may still exist.

## 2017-01-17 NOTE — PLAN OF CARE
Problem: Patient Care Overview (Adult)  Goal: Plan of Care Review  Outcome: Ongoing (interventions implemented as appropriate)    01/17/17 0351   Coping/Psychosocial Response Interventions   Plan Of Care Reviewed With patient;spouse   Patient Care Overview   Progress improving   Outcome Evaluation   Outcome Summary/Follow up Plan no falls, bed alarm on, spouse at bedside, pt complaining of pain at incision site on chest, oxy given PRN, tylenol given PRN, sinus rhythm, pt ambulated in the aguilar, possible D/C, continue to monitor        Goal: Adult Individualization and Mutuality  Outcome: Ongoing (interventions implemented as appropriate)  Goal: Discharge Needs Assessment  Outcome: Ongoing (interventions implemented as appropriate)    Problem: Cardiac Surgery (Adult)  Goal: Signs and Symptoms of Listed Potential Problems Will be Absent or Manageable (Cardiac Surgery)  Outcome: Ongoing (interventions implemented as appropriate)    Problem: Fall Risk (Adult)  Goal: Absence of Falls  Outcome: Ongoing (interventions implemented as appropriate)

## 2017-01-18 VITALS
TEMPERATURE: 98.8 F | RESPIRATION RATE: 16 BRPM | SYSTOLIC BLOOD PRESSURE: 147 MMHG | HEIGHT: 73 IN | HEART RATE: 78 BPM | WEIGHT: 233 LBS | OXYGEN SATURATION: 100 % | BODY MASS INDEX: 30.88 KG/M2 | DIASTOLIC BLOOD PRESSURE: 90 MMHG

## 2017-01-18 LAB — GLUCOSE BLDC GLUCOMTR-MCNC: 224 MG/DL (ref 70–130)

## 2017-01-18 PROCEDURE — 82962 GLUCOSE BLOOD TEST: CPT

## 2017-01-18 PROCEDURE — 93010 ELECTROCARDIOGRAM REPORT: CPT | Performed by: INTERNAL MEDICINE

## 2017-01-18 PROCEDURE — 99238 HOSP IP/OBS DSCHRG MGMT 30/<: CPT | Performed by: INTERNAL MEDICINE

## 2017-01-18 PROCEDURE — 93005 ELECTROCARDIOGRAM TRACING: CPT | Performed by: NURSE PRACTITIONER

## 2017-01-18 PROCEDURE — 99024 POSTOP FOLLOW-UP VISIT: CPT | Performed by: NURSE PRACTITIONER

## 2017-01-18 PROCEDURE — 99233 SBSQ HOSP IP/OBS HIGH 50: CPT | Performed by: INTERNAL MEDICINE

## 2017-01-18 PROCEDURE — 63710000001 INSULIN ASPART PER 5 UNITS: Performed by: INTERNAL MEDICINE

## 2017-01-18 PROCEDURE — 90661 CCIIV3 VAC ABX FR 0.5 ML IM: CPT | Performed by: INTERNAL MEDICINE

## 2017-01-18 PROCEDURE — G0008 ADMIN INFLUENZA VIRUS VAC: HCPCS | Performed by: INTERNAL MEDICINE

## 2017-01-18 PROCEDURE — 25010000002 INFLUENZA VAC SUBUNIT QUAD 0.5 ML SUSPENSION PREFILLED SYRINGE: Performed by: INTERNAL MEDICINE

## 2017-01-18 RX ORDER — PANTOPRAZOLE SODIUM 40 MG/1
40 TABLET, DELAYED RELEASE ORAL DAILY
Qty: 30 TABLET | Refills: 6 | Status: SHIPPED | OUTPATIENT
Start: 2017-01-18

## 2017-01-18 RX ORDER — CYCLOBENZAPRINE HCL 10 MG
10 TABLET ORAL EVERY 8 HOURS PRN
Qty: 60 TABLET | Refills: 3 | Status: SHIPPED | OUTPATIENT
Start: 2017-01-18 | End: 2017-02-14 | Stop reason: HOSPADM

## 2017-01-18 RX ORDER — AMIODARONE HYDROCHLORIDE 400 MG/1
200 TABLET ORAL 2 TIMES DAILY
Qty: 60 TABLET | Refills: 3 | Status: SHIPPED | OUTPATIENT
Start: 2017-01-18

## 2017-01-18 RX ORDER — OXYCODONE HYDROCHLORIDE 10 MG/1
10 TABLET ORAL EVERY 4 HOURS PRN
Qty: 36 TABLET | Refills: 0 | Status: SHIPPED | OUTPATIENT
Start: 2017-01-18 | End: 2017-01-23

## 2017-01-18 RX ORDER — AMIODARONE HYDROCHLORIDE 200 MG/1
200 TABLET ORAL ONCE
Status: COMPLETED | OUTPATIENT
Start: 2017-01-18 | End: 2017-01-18

## 2017-01-18 RX ORDER — METOPROLOL SUCCINATE 25 MG/1
25 TABLET, EXTENDED RELEASE ORAL EVERY 12 HOURS SCHEDULED
Qty: 60 TABLET | Refills: 6 | Status: SHIPPED | OUTPATIENT
Start: 2017-01-18 | End: 2017-01-19 | Stop reason: CLARIF

## 2017-01-18 RX ORDER — ASPIRIN 81 MG/1
81 TABLET ORAL DAILY
Qty: 30 TABLET | Refills: 6 | Status: SHIPPED | OUTPATIENT
Start: 2017-01-18

## 2017-01-18 RX ADMIN — INSULIN ASPART 3 UNITS: 100 INJECTION, SOLUTION INTRAVENOUS; SUBCUTANEOUS at 07:58

## 2017-01-18 RX ADMIN — ASPIRIN 81 MG: 81 TABLET ORAL at 09:57

## 2017-01-18 RX ADMIN — ACETAMINOPHEN 650 MG: 325 TABLET ORAL at 03:53

## 2017-01-18 RX ADMIN — ALPRAZOLAM 0.25 MG: 0.25 TABLET ORAL at 03:53

## 2017-01-18 RX ADMIN — METFORMIN HYDROCHLORIDE 500 MG: 500 TABLET ORAL at 09:57

## 2017-01-18 RX ADMIN — METOPROLOL SUCCINATE 25 MG: 25 TABLET, FILM COATED, EXTENDED RELEASE ORAL at 09:57

## 2017-01-18 RX ADMIN — SITAGLIPTIN 100 MG: 100 TABLET, FILM COATED ORAL at 09:57

## 2017-01-18 RX ADMIN — OXYCODONE HYDROCHLORIDE 10 MG: 5 TABLET ORAL at 02:09

## 2017-01-18 RX ADMIN — AMIODARONE HYDROCHLORIDE 200 MG: 200 TABLET ORAL at 09:57

## 2017-01-18 RX ADMIN — INSULIN ASPART 5 UNITS: 100 INJECTION, SOLUTION INTRAVENOUS; SUBCUTANEOUS at 09:56

## 2017-01-18 RX ADMIN — PANTOPRAZOLE SODIUM 40 MG: 40 TABLET, DELAYED RELEASE ORAL at 07:58

## 2017-01-18 RX ADMIN — INFLUENZA A VIRUS A/BRISBANE/10/2010 (H1N1) ANTIGEN (MDCK CELL DERIVED, PROPIOLACTONE INACTIVATED), INFLUENZA A VIRUS A/HONG KONG/4801/2014 (H3N2) ANTIGEN (MDCK CELL DERIVED, PROPIOLACTONE INACTIVATED), INFLUENZA B VIRUS B/UTAH/9/2014 ANTIGEN (MDCK CELL DERIVED, PROPIOLACTONE INACTIVATED) AND INFLUENZA B VIRUS B/HONG KONG/259/2010 ANTIGEN (MDCK CELL DERIVED, PROPIOLACTONE INACTIVATED) 0.5 ML: 15; 15; 15; 15 INJECTION, SUSPENSION INTRAMUSCULAR at 09:29

## 2017-01-18 NOTE — CONSULTS
"Diabetes Education  Assessment/Teaching    Patient Name:  Oswald Savage  YOB: 1970  MRN: [id number]  Admit Date:  1/12/2017    Assessment Date:  1/18/2017       Most Recent Value    General Information      Referral From:   Blood glucose [for insulin teach. ]    Height  6' 1\" (1.854 m)    Height Method  Stated    Weight  233 lb (106 kg)    Diabetes History     What type of diabetes do you have?  Type 2    Length of Diabetes Diagnosis  6 - 10 years [pt reports 8 yr hx DM. ]    Have you had diabetes education/teaching in the past?  no    Do you test your blood sugar at home?  yes    Do you have any diabetes complications?  circulation problems, heart disease    Education Preferences     Barriers to Learning  -- [none observed at this time. ]    Assessment Topics     Taking Medication - Assessment  Needs education [anticipate pt will dc new to Levemir/insulin. ]    Problem Solving - Assessment  Needs education [Review: hypoglycemia/tx. ]    Reducing Risk - Assessment  Needs education    Healthy Coping - Assessment  Competent          Most Recent Value    DM Education Needs     Meter  Has own    Frequency of Testing  AC meals [advise pt test BG ac meals/hs upon dc. ]    Blood Glucose Target  -- [Below 180.]    Medication  Insulin, Administration, Pen [Instruct pt on taking Levemir nightly via pen. ]    Problem Solving  Hypoglycemia, Symptoms, Treatment. Review.     Reducing Risks  -- [Advise pt of importance of BG control to help avoid SSI. ]    Healthy Eating  Other (comment) [Advise pt avoid or limit sugary beverages and juice. ]    Healthy Coping  Appropriate    Discharge Plan  Home, Follow-up with endocrinolgoist [pt reports plan is f/u with [facility] in 4 wks. ]    Motivation  Engaged    Teaching Method  Explanation, Discussion, Demonstration, Handouts, Teach back    Patient Response  Verbalized understanding [Teach back for insulin pen use. ]        Electronically signed by:  Marjan ANDERSON" Paul RN, BSN, CDE   01/18/17 11:31 AM

## 2017-01-18 NOTE — PLAN OF CARE
Problem: Patient Care Overview (Adult)  Goal: Plan of Care Review  Outcome: Ongoing (interventions implemented as appropriate)  Goal: Adult Individualization and Mutuality  Outcome: Ongoing (interventions implemented as appropriate)  Goal: Discharge Needs Assessment  Outcome: Ongoing (interventions implemented as appropriate)    Problem: Cardiac Surgery (Adult)  Goal: Signs and Symptoms of Listed Potential Problems Will be Absent or Manageable (Cardiac Surgery)  Outcome: Ongoing (interventions implemented as appropriate)    Problem: Fall Risk (Adult)  Goal: Absence of Falls  Outcome: Ongoing (interventions implemented as appropriate)

## 2017-01-18 NOTE — PROGRESS NOTES
"      PROGRESS NOTE   LOS: 5 days   Patient Care Team:  Rose Marie Farr MD as PCP - General (Internal Medicine)    Chief Complaint: No respiratory complaint on room air for 2 days    Interval History: Patient is status post 7 vessel CABG and he  was reported to have snoring and was having some desaturation at night and was evaluated for sleep apnea with initial screening test was an overnight oximetry that was reviewed and summarized below.  He denies any hypersomnia as he does have some dry mouth at night, he is agreeable for outpatient evaluation with a home sleep study.he is ambulating on room air with normal exercise oximetry on room air today.  Patient is being discharged home today.  REVIEW OF SYSTEMS:   No shortness of breath on room air no chest pain beyond typical post CABG no GI or  complaints.         Vital Signs  Temp:  [98 °F (36.7 °C)-99.9 °F (37.7 °C)] 98.8 °F (37.1 °C)  Heart Rate:  [73-86] 78  Resp:  [16-18] 16  BP: (131-166)/(77-95) 147/90    Intake/Output Summary (Last 24 hours) at 01/18/17 1005  Last data filed at 01/18/17 0400   Gross per 24 hour   Intake   1020 ml   Output      0 ml   Net   1020 ml     Flowsheet Rows         First Filed Value    Admission Height  73\" (185.4 cm) Documented at 01/12/2017 0102    Admission Weight  245 lb (111 kg) Documented at 01/12/2017 0102          Physical Exam:   General Appearance:    Alert, cooperative, in no acute distress   Lungs:     Clear to auscultation,respirations regular, even and                  unlabored, decreased over the bases but no crackles     Heart:    Regular rhythm and normal rate, normal S1 and S2, no            murmur, no gallop, no rub, no click   Chest Wall:    No abnormalities observed   Abdomen:     Normal bowel sounds, no masses, no organomegaly, soft        non-tender, non-distended, no guarding, no rebound                tenderness   Extremities:   Moves all extremities well, no edema, no cyanosis, no             " redness  Mallampati 4 airways was enlarged uvula      Results Review:        Results from last 7 days  Lab Units 01/17/17  0610 01/16/17  0333 01/15/17  0341 01/14/17  0329 01/13/17  1944 01/13/17  1529 01/13/17  1200 01/13/17  0349   SODIUM mmol/L 136 137 138 145  --  145 143 139   POTASSIUM mmol/L 3.7 4.2 4.4 4.3 4.8 4.3 4.0 4.2   CHLORIDE mmol/L 94* 95* 98 103  --  107 104 101   TOTAL CO2 mmol/L 28.8 24.7 24.3 26.3  --  23.6 25.7 24.3   BUN mg/dL 17 14 11 12  --  11 11 11   CREATININE mg/dL 0.88 0.80 0.84 1.02  --  1.07 1.24 0.93   CALCIUM mg/dL 9.2 9.6 9.4 9.1  --  9.1 9.3 9.0       Results from last 7 days  Lab Units 01/12/17  0659 01/12/17  0110   TROPONIN T ng/mL <0.010 <0.010       Results from last 7 days  Lab Units 01/17/17  0610 01/16/17  0333 01/15/17  0341 01/14/17  0329 01/13/17  1529 01/13/17  1201 01/13/17  1059  01/12/17  1953   WBC 10*3/mm3 7.66 10.00 12.74* 11.02* 10.80* 8.75  --   --  7.22   HEMOGLOBIN g/dL 14.1 15.3 14.0 13.4* 14.3 14.6  --   --  15.7   HEMOGLOBIN, ARTERIAL POC g/dL  --   --   --   --   --   --  11.6*  < >  --    HEMATOCRIT % 39.9* 43.1 40.8 40.1* 39.8* 41.2  --   --  45.5   HEMATOCRIT POC %  --   --   --   --   --   --  34*  < >  --    PLATELETS 10*3/mm3 184 151 142 128* 117* 113*  --   --  196   < > = values in this interval not displayed.    Results from last 7 days  Lab Units 01/14/17  0329 01/13/17  1201   INR  1.26* 1.25*   APTT seconds  --  25.7       Results from last 7 days  Lab Units 01/14/17  0329   MAGNESIUM mg/dL 2.4       Results from last 7 days  Lab Units 01/12/17  1953   CHOLESTEROL mg/dL 144   TRIGLYCERIDES mg/dL 165*   HDL CHOL mg/dL 29*       Results from last 7 days  Lab Units 01/13/17  1747 01/13/17  1528 01/13/17  1227 01/13/17  1059 01/13/17  1034 01/13/17  1003 01/13/17  0943  01/13/17  0414   PH, ARTERIAL pH units 7.330* 7.403 7.357 7.34* 7.37 7.38 7.30*  < > 7.407   PO2 ART mm Hg 112.9* 116.7* 253.3*  --   --   --   --   --  77.3*   PCO2, ARTERIAL mm  Hg 49.2* 41.9 44.7  --   --   --   --   --  43.0   HCO3 ART mmol/L 26.0 26.1 25.0  --   --   --   --   --  27.1   < > = values in this interval not displayed.    Results from last 7 days  Lab Units 01/12/17 1953   HEMOGLOBIN A1C % 7.20*     GLUCOSE   Date/Time Value Ref Range Status   01/18/2017 0631 224 (H) 70 - 130 mg/dL Final   01/17/2017 2004 251 (H) 70 - 130 mg/dL Final   01/17/2017 1537 223 (H) 70 - 130 mg/dL Final   01/17/2017 1045 297 (H) 70 - 130 mg/dL Final   01/17/2017 0546 267 (H) 70 - 130 mg/dL Final   01/16/2017 1942 302 (H) 70 - 130 mg/dL Final   01/16/2017 1623 258 (H) 70 - 130 mg/dL Final   01/16/2017 1105 306 (H) 70 - 130 mg/dL Final          Overnight oximetry was positive for sleep related hypoxemia with desaturation that can be suggestive of obstructive sleep apnea.      Exercise Oximetry on RA 1/18/2017  ROOM AIR BASELINE   SpO2% 97   Heart Rate 80   Blood Pressure       EXERCISE ON ROOM AIR SpO2% EXERCISE ON O2 @ LPM SpO2%   1 MINUTE 96 1 MINUTE     2 MINUTES 98 2 MINUTES     3 MINUTES 97 3 MINUTES     4 MINUTES 97 4 MINUTES     5 MINUTES 97 5 MINUTES     6 MINUTES 95 6 MINUTES         Distance Walked  Distance Walked   Dyspnea (Lourdes Scale)  Dyspnea (Lourdes Scale)   Fatigue (Lourdes Scale)  Fatigue (Lourdes Scale)   SpO2% Post Exercise 97 SpO2% Post Exercise   HR Post Exercise 79 HR Post Exercise   Time to Recovery  Time to Recovery             Medication Review:     amiodarone 400 mg Oral Q12H   aspirin 81 mg Oral Daily   atorvastatin 40 mg Oral Nightly   enoxaparin 40 mg Subcutaneous Q24H   guaiFENesin 1,200 mg Oral BID   insulin aspart 2-5 Units Subcutaneous 4x Daily AC & at Bedtime   insulin aspart 5 Units Subcutaneous TID With Meals   insulin detemir 20 Units Subcutaneous Nightly   metFORMIN 500 mg Oral BID With Meals   metoprolol succinate XL 25 mg Oral Q12H   mupirocin 1 application Each Nare Daily   nicotine 1 patch Transdermal Daily   pantoprazole 40 mg Oral Q AM   sennosides-docusate sodium  2 tablet Oral Nightly   SITagliptin 100 mg Oral Daily         niCARdipine 5-15 mg/hr Last Rate: Stopped (01/14/17 0755)       ASSESSMENT:   1. Coronary artery disease status post CABGx7 on this admission and previous history of angioplasty in 2007  2. Obstructive sleep apnea suspected based on his overnight oximetry for further confirmation with an outpatient sleep study  3. No significant daytime fatigue or hypersomnia however patient has obesity and narrow airway on physical exam  4. Obesity  5. Diabetes mellitus  6. Systemic hypertension  7. Sleep related hypoxemia based on the overnight oximetry , with normal exercise oximetry on room air on the day of discharge with no need for nocturnal or an blistery oxygen at this point we'll reassess depending on the home sleep study results which will be done post discharge      PLAN:    Okay to discharge home and no oxygen is needed  Patient is to call her office to arrange for the home sleep study, contact number were provided  Will follow up depending on the sleep study results  Discussed with the patient and with the staff    Jun Crews MD  01/18/17  10:05 AM      EMR Dragon/Transcription disclaimer:   Much of this encounter note is an electronic transcription/translation of spoken language to printed text. The electronic translation of spoken language may permit erroneous, or at times, nonsensical words or phrases to be inadvertently transcribed; Although I have reviewed the note for such errors, some may still exist.

## 2017-01-18 NOTE — PROGRESS NOTES
46 y.o.   LOS: 5 days   Patient Care Team:  Rose Marie Farr MD as PCP - General (Internal Medicine)    Chief Complaint:  Uncontrolled type 2 diabetes mellitus and postoperative management    Chief Complaint   Patient presents with   • Chest Pain     MIDSTERNAL CHEST BURNING, NO RADIATION. PT REPORTS MILD SOA. FELT GENERALIZED WEAKNESS AT ONSET. ONSET APROX 2350       Subjective     HPI  Patient is postoperative day 3 and  He is ambulating well  His blood sugars are still in the 200 range  He tolerated the metformin and Januvia so far  He is going to go home today and needs adjustment of his insulin regimen  Patient received Levemir 20 units last night and blood sugar was still in the 200 range this morning    Interval History:    Patient is a 46-year-old male from [place] with a past medical history of uncontrolled type 2 diabetes mellitus, hypertension, hyperlipidemia, tobacco abuse, coronary artery disease with prior angioplasty in 2008 admitted to the hospital with chest pain and further evaluation revealed that he needs coronary artery bypass surgery.  Patient underwent surgery yesterday and was on insulin drip until this morning when it was discontinued.  Patient is currently extubated and is not on IV insulin  He is not eating yet is very groggy and has received pain medication.  Most of the history was obtained from chart review.  As per the nurse patient will advance to maybe clear liquids this evening     Patient is very groggy and could not provide much information  Home med list reviewed and he is on Janumet and Amaryl at home.  Compliance is under question  It is also unclear if the patient has any diabetes related complications.  Review of Systems:      Review of Systems   Constitutional: Positive for fatigue.   Respiratory: Negative for shortness of breath.    Cardiovascular: Negative for chest pain.   Gastrointestinal: Negative for abdominal distention, constipation, nausea and vomiting.   All  other systems reviewed and are negative.    Objective     Vital Signs   Temp:  [98 °F (36.7 °C)-99.9 °F (37.7 °C)] 98.8 °F (37.1 °C)  Heart Rate:  [73-86] 78  Resp:  [16-18] 16  BP: (131-166)/(77-95) 147/90    Physical Exam:  Physical Exam   Constitutional: He is oriented to person, place, and time. He appears well-developed and well-nourished.   HENT:   Head: Normocephalic and atraumatic.   Eyes: EOM are normal. Pupils are equal, round, and reactive to light.   Neck: Normal range of motion. Neck supple. No thyromegaly present.   Cardiovascular: Normal rate, regular rhythm, normal heart sounds and intact distal pulses.    Pulmonary/Chest: Effort normal and breath sounds normal.   Abdominal: Soft. Bowel sounds are normal. He exhibits distension. There is no tenderness.   Musculoskeletal: Normal range of motion. He exhibits no edema.   Neurological: He is alert and oriented to person, place, and time.   Skin: Skin is warm and dry.   Psychiatric: He has a normal mood and affect. His behavior is normal.   Nursing note and vitals reviewed.  Results Review:     I reviewed the patient's new clinical results.      GLUCOSE   Date/Time Value Ref Range Status   01/17/2017 0610 265 (H) 65 - 99 mg/dL Final   01/16/2017 0333 251 (H) 65 - 99 mg/dL Final     Lab Results (last 72 hours)     Procedure Component Value Units Date/Time    POC Glucose Fingerstick [[id number]]  (Abnormal) Collected:  01/15/17 1035    Specimen:  Blood Updated:  01/15/17 1036     Glucose 214 (H) mg/dL     Narrative:       Meter: [id number] : [id number] Libby SHEFFIELD    POC Glucose Fingerstick [[id number]]  (Abnormal) Collected:  01/15/17 1551    Specimen:  Blood Updated:  01/15/17 1552     Glucose 214 (H) mg/dL     Narrative:       Meter: [id number] : [id number] Libby SHEFFIELD    POC Glucose Fingerstick [[id number]]  (Abnormal) Collected:  01/15/17 1932    Specimen:  Blood Updated:  01/15/17 1933     Glucose 227 (H) mg/dL     Narrative:        Meter: [id number] : [id number] Robert Posada    CBC (No Diff) [[id number]]  (Abnormal) Collected:  01/16/17 0333    Specimen:  Blood Updated:  01/16/17 0413     WBC 10.00 10*3/mm3      RBC 4.43 (L) 10*6/mm3      Hemoglobin 15.3 g/dL      Hematocrit 43.1 %      MCV 97.3 (H) fL      MCH 34.5 (H) pg      MCHC 35.5 g/dL      RDW 11.6 %      RDW-SD 41.2 fl      MPV 10.8 fL      Platelets 151 10*3/mm3     Basic Metabolic Panel [[id number]]  (Abnormal) Collected:  01/16/17 0333    Specimen:  Blood Updated:  01/16/17 0434     Glucose 251 (H) mg/dL      BUN 14 mg/dL      Creatinine 0.80 mg/dL      Sodium 137 mmol/L      Potassium 4.2 mmol/L      Chloride 95 (L) mmol/L      CO2 24.7 mmol/L      Calcium 9.6 mg/dL      eGFR Non African Amer 103 mL/min/1.73      BUN/Creatinine Ratio 17.5      Anion Gap 17.3 mmol/L     Narrative:       GFR Normal >60  Chronic Kidney Disease <60  Kidney Failure <15    POC Glucose Fingerstick [[id number]]  (Abnormal) Collected:  01/16/17 0551    Specimen:  Blood Updated:  01/16/17 0552     Glucose 244 (H) mg/dL     Narrative:       Meter: [id number] : [id number] Robert Posada    POC Glucose Fingerstick [[id number]]  (Abnormal) Collected:  01/16/17 1105    Specimen:  Blood Updated:  01/16/17 1107     Glucose 306 (H) mg/dL     Narrative:       Meter: [id number] : [id number] Kush Ceja    POC Glucose Fingerstick [[id number]]  (Abnormal) Collected:  01/16/17 1623    Specimen:  Blood Updated:  01/16/17 1626     Glucose 258 (H) mg/dL     Narrative:       Meter: [id number] : [id number] Kush Ceja    POC Glucose Fingerstick [[id number]]  (Abnormal) Collected:  01/16/17 1942    Specimen:  Blood Updated:  01/16/17 1945     Glucose 302 (H) mg/dL     Narrative:       Meter: [id number] : [id number] Katelin Pulido    POC Glucose Fingerstick [[id number]]  (Abnormal) Collected:  01/17/17 0546    Specimen:  Blood Updated:  01/17/17 0547     Glucose 267 (H) mg/dL     Narrative:       Meter:  [id number] : [id number] White Tess    CBC (No Diff) [[id number]]  (Abnormal) Collected:  01/17/17 0610    Specimen:  Blood Updated:  01/17/17 0623     WBC 7.66 10*3/mm3      RBC 4.07 (L) 10*6/mm3      Hemoglobin 14.1 g/dL      Hematocrit 39.9 (L) %      MCV 98.0 (H) fL      MCH 34.6 (H) pg      MCHC 35.3 g/dL      RDW 11.6 %      RDW-SD 41.5 fl      MPV 10.1 fL      Platelets 184 10*3/mm3     Basic Metabolic Panel [[id number]]  (Abnormal) Collected:  01/17/17 0610    Specimen:  Blood Updated:  01/17/17 0641     Glucose 265 (H) mg/dL      BUN 17 mg/dL      Creatinine 0.88 mg/dL      Sodium 136 mmol/L      Potassium 3.7 mmol/L      Chloride 94 (L) mmol/L      CO2 28.8 mmol/L      Calcium 9.2 mg/dL      eGFR Non African Amer 92 mL/min/1.73      BUN/Creatinine Ratio 19.3      Anion Gap 13.2 mmol/L     Narrative:       GFR Normal >60  Chronic Kidney Disease <60  Kidney Failure <15    POC Glucose Fingerstick [[id number]]  (Abnormal) Collected:  01/17/17 1045    Specimen:  Blood Updated:  01/17/17 1046     Glucose 297 (H) mg/dL     Narrative:       Meter: [id number] : [id number] [place]    POC Glucose Fingerstick [[id number]]  (Abnormal) Collected:  01/17/17 1537    Specimen:  Blood Updated:  01/17/17 1538     Glucose 223 (H) mg/dL     Narrative:       Meter: [id number] : [id number] Gabe Shanti    POC Glucose Fingerstick [[id number]]  (Abnormal) Collected:  01/17/17 2004    Specimen:  Blood Updated:  01/17/17 2006     Glucose 251 (H) mg/dL     Narrative:       Meter: [id number] : [id number] Katelin Pulido    POC Glucose Fingerstick [[id number]]  (Abnormal) Collected:  01/18/17 0631    Specimen:  Blood Updated:  01/18/17 0632     Glucose 224 (H) mg/dL     Narrative:       Meter: [id number] : [id number] White Tess        Imaging Results (last 72 hours)     Procedure Component Value Units Date/Time    XR Chest 1 View [[id number]] Collected:  01/15/17 0802     Updated:  01/15/17 1543    Narrative:       AP  CHEST 01/15/2017 AT 0450 HOURS     HISTORY: Postop cardiac surgery.     FINDINGS: The heart size is at the upper limits of normal. Lungs are  underinflated with some vascular crowding. There is some probable  atelectasis in the left perihilar region and left lung base. Bilateral  chest tubes are seen and unchanged in position from yesterday's study.     No pneumothorax is seen.     Sternotomy wires are seen.     The right side central venous catheter has been removed since the  previous study.     Overall the chest appears largely unchanged from 01/14/2017.     This report was finalized on 1/15/2017 3:40 PM by Dr. Percy Gallardo MD.       XR Chest 1 View [[id number]] Collected:  01/15/17 1347     Updated:  01/15/17 1553    Narrative:       AP CHEST 01/15/2017     HISTORY: Chest tube removal.     FINDINGS: There is no evidence of pneumothorax following the bilateral  chest tube removal. There are some patchy areas of mild probable  atelectasis in both lung bases. Heart size is mildly enlarged.  Sternotomy wires are seen. Lungs are underinflated.       Impression:       No evidence of pneumothorax following the chest tube  removal.     This report was finalized on 1/15/2017 3:50 PM by Dr. Percy Gallardo MD.       XR Chest PA & Lateral [[id number]] Collected:  01/16/17 1843     Updated:  01/16/17 1846    Narrative:       TWO-VIEW CHEST     HISTORY: 46-year-old male postop CABG     COMPARISON: 01/15/2017     FINDINGS:  1. Persistent bilateral atelectasis with improvement in aeration at the  lung bases since prior study.  2. No new abnormality, no other change.     This report was finalized on 1/16/2017 6:43 PM by Dr. Emigdio Marcos MD.         Radiology images personally reviewed by me    Medication Review:       Current Facility-Administered Medications:   •  acetaminophen (TYLENOL) tablet 650 mg, 650 mg, Oral, Q4H PRN, PATRICA Solis, 650 mg at 01/18/17 0353  •  ALPRAZolam (XANAX) tablet 0.25 mg, 0.25 mg,  Oral, Q8H PRN, Tita Jorgensen, APRN, 0.25 mg at 01/18/17 0353  •  amiodarone (PACERONE) tablet 400 mg, 400 mg, Oral, Q12H, Diandra Suarez MD, 400 mg at 01/17/17 2157  •  aspirin EC tablet 81 mg, 81 mg, Oral, Daily, Tita A Jorgensen, APRN, 81 mg at 01/18/17 0957  •  atorvastatin (LIPITOR) tablet 40 mg, 40 mg, Oral, Nightly, Quincy Carolina MD, 40 mg at 01/17/17 2157  •  bisacodyl (DULCOLAX) EC tablet 10 mg, 10 mg, Oral, Daily PRN, Tita Jorgensen, APRN  •  bisacodyl (DULCOLAX) suppository 10 mg, 10 mg, Rectal, Daily PRN, Tita Jorgensen, APRN, 10 mg at 01/17/17 1250  •  cyclobenzaprine (FLEXERIL) tablet 10 mg, 10 mg, Oral, Q8H PRN, Tita Jorgensen, APRN, 10 mg at 01/15/17 0226  •  dextrose (D50W) solution 25 g, 25 g, Intravenous, Q15 Min PRN, Alexandre Farrell MD  •  dextrose (GLUTOSE) oral gel 15 g, 15 g, Oral, Q15 Min PRN, Alexandre Farrell MD  •  enoxaparin (LOVENOX) syringe 40 mg, 40 mg, Subcutaneous, Q24H, Tita Jorgensen, APRN, 40 mg at 01/17/17 1710  •  glucagon (human recombinant) (GLUCAGEN DIAGNOSTIC) injection 1 mg, 1 mg, Subcutaneous, Q15 Min PRN, Alexandre Farrell MD  •  guaiFENesin (MUCINEX) 12 hr tablet 1,200 mg, 1,200 mg, Oral, BID, Alexandre Farrell MD, 1,200 mg at 01/17/17 1710  •  HYDROcodone-acetaminophen (NORCO) 5-325 MG per tablet 2 tablet, 2 tablet, Oral, Q4H PRN, Tita Jorgensen, APRN, 1 tablet at 01/17/17 1716  •  Influenza Vac Subunit Quad (FLUCELVAX) injection 0.5 mL, 0.5 mL, Intramuscular, During Hospitalization, Kaiser Abdi MD  •  insulin aspart (novoLOG) injection 2-5 Units, 2-5 Units, Subcutaneous, 4x Daily AC & at Bedtime, Basilio PETIT MD, 3 Units at 01/18/17 0758  •  insulin aspart (novoLOG) injection 5 Units, 5 Units, Subcutaneous, TID With Meals, Basilio PETIT MD, 5 Units at 01/18/17 0956  •  insulin detemir (LEVEMIR) injection 20 Units, 20 Units, Subcutaneous, Nightly, Basilio PETIT MD, 20 Units at 01/17/17 0928  •  magnesium hydroxide (MILK OF MAGNESIA)  suspension 2400 mg/10mL 10 mL, 10 mL, Oral, Daily PRN, Tita Jorgensen, APRN, 10 mL at 17 1805  •  metFORMIN (GLUCOPHAGE) tablet 500 mg, 500 mg, Oral, BID With Meals, Basilio PETIT MD, 500 mg at 17 0957  •  metoprolol succinate XL (TOPROL-XL) 24 hr tablet 25 mg, 25 mg, Oral, Q12H, Diandra Suarez MD, 25 mg at 17 0957  •  morphine injection 4 mg, 4 mg, Intravenous, Q2H PRN, 4 mg at 17 1932 **AND** naloxone (NARCAN) injection 0.4 mg, 0.4 mg, Intravenous, Q5 Min PRN, Tita Jorgensen, APRN  •  mupirocin (BACTROBAN) 2 % nasal ointment 1 application, 1 application, Each Nare, Daily, Tita Jorgensen, APRN, 1 application at 17 0846  •  [] morphine injection 4 mg, 4 mg, Intravenous, Q15 Min PRN, 4 mg at 17 1836 **AND** naloxone (NARCAN) injection 0.4 mg, 0.4 mg, Intravenous, Q5 Min PRN, Tita Jorgensen, APRN  •  niCARdipine (CARDENE) 25 mg/250 mL 0.9% NS VTB (mbp), 5-15 mg/hr, Intravenous, Continuous PRN, Tita Jorgensen, APRN, Stopped at 17 0755  •  nicotine (NICODERM CQ) 21 MG/24HR patch 1 patch, 1 patch, Transdermal, Daily, Tita Jorgensen, APRN, 1 patch at 17 1717  •  ondansetron (ZOFRAN) tablet 4 mg, 4 mg, Oral, Q6H PRN **OR** ondansetron ODT (ZOFRAN-ODT) disintegrating tablet 4 mg, 4 mg, Oral, Q6H PRN **OR** ondansetron (ZOFRAN) injection 4 mg, 4 mg, Intravenous, Q6H PRN, Diandra Suarez MD, 4 mg at 17 1047  •  ondansetron (ZOFRAN) injection 4 mg, 4 mg, Intravenous, Q6H PRN, Tita A Jorgensen, APRN  •  oxyCODONE (ROXICODONE) immediate release tablet 10 mg, 10 mg, Oral, Q4H PRN, Tita A Jorgensen, APRN, 10 mg at 17 0209  •  [DISCONTINUED] pantoprazole (PROTONIX) injection 40 mg, 40 mg, Intravenous, Q AM **OR** pantoprazole (PROTONIX) EC tablet 40 mg, 40 mg, Oral, Q AM, Tita A Jorgensen, APRN, 40 mg at 17 8838  •  potassium chloride (MICRO-K) CR capsule 40 mEq, 40 mEq, Oral, PRN **OR** potassium chloride (KLOR-CON) packet 40 mEq, 40 mEq, Oral, PRN, Tita A Jorgensen,  APRN  •  potassium chloride (MICRO-K) CR capsule 40 mEq, 40 mEq, Oral, PRN **OR** potassium chloride (KLOR-CON) packet 40 mEq, 40 mEq, Oral, PRN **OR** potassium chloride 10 mEq in 100 mL IVPB, 10 mEq, Intravenous, Q1H PRN, Alexandre Farrell MD  •  potassium chloride 10 mEq in 100 mL IVPB, 10 mEq, Intravenous, Q1H PRN **OR** potassium chloride 10 mEq in 100 mL IVPB, 10 mEq, Intravenous, Q1H PRN, Tita Jorgensen, PATRICA  •  [DISCONTINUED] promethazine (PHENERGAN) tablet 12.5 mg, 12.5 mg, Oral, Q6H PRN **OR** promethazine (PHENERGAN) injection 12.5 mg, 12.5 mg, Intravenous, Q6H PRN, Tita Jorgensen, PATRICA  •  sennosides-docusate sodium (SENOKOT-S) 8.6-50 MG tablet 2 tablet, 2 tablet, Oral, Nightly, PATRICA Solis, 2 tablet at 01/17/17 3557  •  SITagliptin (JANUVIA) tablet 100 mg, 100 mg, Oral, Daily, Basilio PETIT MD, 100 mg at 01/18/17 0957  •  sodium chloride 0.9 % flush 1-10 mL, 1-10 mL, Intravenous, PRN, Diandra Suarez MD  •  temazepam (RESTORIL) capsule 15 mg, 15 mg, Oral, Nightly PRN, Alexandre Farrell MD, 15 mg at 01/12/17 2215    Assessment/Plan     Patient Active Problem List   Diagnosis   • Chest pain at rest   uncontrolled type 2 diabetes mellitus  Status post coronary artery bypass grafting postop day 3  Obesity  Hyperlipidemia  Hypertension      Hemoglobin A1c 7.2%  Triglycerides are elevated.     Patient was started on Levemir and NovoLog regimen  Accu-Cheks remained in the 200 range  He was also started on Januvia and metformin  Vision is going home today  I recommend that he take Levemir 20 units at bedtime  Continue Janumet at home one tablet twice daily  Advised to not to restart Amaryl at home    Patient is diabetic education as well as insulin training during this hospitalization  Prescription sent to pharmacy  Patient will follow up with us in 3-4 weeks.    The total time spent for old record and lab review and face- to- face was more than 35 min of which greater than 50% of time was  spent on counseling the patient on recommended evaluation and treatment options, instructions for management/treatment and /or follow up  and importance of compliance with chosen management or treatment options      Basilio Sharp MD FACE.  01/18/17  10:29 AM      EMR Dragon / transcription disclaimer:    Much of this encounter note is an electronic transcription/ translation of spoken language to printed text.  Electronic translation of spoken language may permit erroneous, or at times, nonsensical words or phrases to be inadvertently transcribed; although I have reviewed the note for such errors, some may still exist.

## 2017-01-18 NOTE — PROGRESS NOTES
" LOS: 5 days   Patient Care Team:  Rose Marie Farr MD as PCP - General (Internal Medicine)    Chief Complaint: F/u CABG    Subjective     Doing good, ready to go home      Vital Signs  Temp:  [98 °F (36.7 °C)-99.9 °F (37.7 °C)] 98.8 °F (37.1 °C)  Heart Rate:  [73-86] 78  Resp:  [16-18] 16  BP: (131-166)/(77-95) 147/90  Body mass index is 30.74 kg/(m^2).    Intake/Output Summary (Last 24 hours) at 01/18/17 0837  Last data filed at 01/18/17 0400   Gross per 24 hour   Intake   1020 ml   Output      0 ml   Net   1020 ml          \\    Last 3 weights    01/16/17  0500 01/17/17  0517 01/18/17  0538   Weight: 233 lb 6.4 oz (106 kg) 232 lb 3.2 oz (105 kg) 233 lb (106 kg)         Objective:  General Appearance:  Comfortable and in no acute distress.    Vital signs: (most recent): Blood pressure 147/90, pulse 78, temperature 98.8 °F (37.1 °C), temperature source Oral, resp. rate 16, height 73\" (185.4 cm), weight 233 lb (106 kg), SpO2 100 %.  Vital signs are normal.    Lungs:  Normal respiratory rate and normal effort.  Breath sounds clear to auscultation.    Heart: Normal rate.  Regular rhythm.  S1 normal and S2 normal.  (Tele: SR)  Abdomen: Abdomen is soft.    Extremities: Normal range of motion.    Neurological: Patient is alert and oriented to person, place and time.    Skin:  Warm and dry.  (Incision wnl)            Results Review:        WBC WBC   Date Value Ref Range Status   01/17/2017 7.66 4.50 - 10.70 10*3/mm3 Final   01/16/2017 10.00 4.50 - 10.70 10*3/mm3 Final      HGB HEMOGLOBIN   Date Value Ref Range Status   01/17/2017 14.1 13.7 - 17.6 g/dL Final   01/16/2017 15.3 13.7 - 17.6 g/dL Final      HCT HEMATOCRIT   Date Value Ref Range Status   01/17/2017 39.9 (L) 40.4 - 52.2 % Final   01/16/2017 43.1 40.4 - 52.2 % Final      Platelets PLATELETS   Date Value Ref Range Status   01/17/2017 184 140 - 500 10*3/mm3 Final   01/16/2017 151 140 - 500 10*3/mm3 Final        PT/INR:  No results found for: PROTIME/No results " found for: INR    Sodium SODIUM   Date Value Ref Range Status   01/17/2017 136 136 - 145 mmol/L Final   01/16/2017 137 136 - 145 mmol/L Final      Potassium POTASSIUM   Date Value Ref Range Status   01/17/2017 3.7 3.5 - 5.2 mmol/L Final   01/16/2017 4.2 3.5 - 5.2 mmol/L Final      Chloride CHLORIDE   Date Value Ref Range Status   01/17/2017 94 (L) 98 - 107 mmol/L Final   01/16/2017 95 (L) 98 - 107 mmol/L Final      Bicarbonate CO2   Date Value Ref Range Status   01/17/2017 28.8 22.0 - 29.0 mmol/L Final   01/16/2017 24.7 22.0 - 29.0 mmol/L Final      BUN BUN   Date Value Ref Range Status   01/17/2017 17 6 - 20 mg/dL Final   01/16/2017 14 6 - 20 mg/dL Final      Creatinine CREATININE   Date Value Ref Range Status   01/17/2017 0.88 0.76 - 1.27 mg/dL Final   01/16/2017 0.80 0.76 - 1.27 mg/dL Final      Calcium CALCIUM   Date Value Ref Range Status   01/17/2017 9.2 8.6 - 10.5 mg/dL Final   01/16/2017 9.6 8.6 - 10.5 mg/dL Final      Magnesium No results found for: MG         amiodarone 400 mg Oral Q12H   aspirin 81 mg Oral Daily   atorvastatin 40 mg Oral Nightly   enoxaparin 40 mg Subcutaneous Q24H   guaiFENesin 1,200 mg Oral BID   insulin aspart 2-5 Units Subcutaneous 4x Daily AC & at Bedtime   insulin aspart 5 Units Subcutaneous TID With Meals   insulin detemir 20 Units Subcutaneous Nightly   metFORMIN 500 mg Oral BID With Meals   metoprolol succinate XL 25 mg Oral Q12H   mupirocin 1 application Each Nare Daily   nicotine 1 patch Transdermal Daily   pantoprazole 40 mg Oral Q AM   sennosides-docusate sodium 2 tablet Oral Nightly   SITagliptin 100 mg Oral Daily       niCARdipine 5-15 mg/hr Last Rate: Stopped (01/14/17 3301)           Patient Active Problem List   Diagnosis Code   • Chest pain at rest R07.9       Assessment & Plan     -Severe multivessel CAD w/preserved LV systolic function, EF 55%  -Hx of CAD with PCI in the past  -POD#5 CABG x7 w/LIMA   -Expected post op acute blood loss anemia,  stable  -Thrombocytopenia, resolved  -HTN-stable  -HLD, treated  -DM, treated   -Postop PAFL, now in SR, amio per cardiology     DC home today    Tita Jorgensen, APRN  01/18/17  8:37 AM

## 2017-01-18 NOTE — PROGRESS NOTES
Exercise Oximetry    Patient Name:Oswald Savage   MRN: [id number]   Date: 01/18/17             ROOM AIR BASELINE   SpO2% 97   Heart Rate 80   Blood Pressure      EXERCISE ON ROOM AIR SpO2% EXERCISE ON O2 @  LPM SpO2%   1 MINUTE 96 1 MINUTE    2 MINUTES 98 2 MINUTES    3 MINUTES 97 3 MINUTES    4 MINUTES 97 4 MINUTES    5 MINUTES 97 5 MINUTES    6 MINUTES 95 6 MINUTES               Distance Walked   Distance Walked   Dyspnea (Lourdes Scale)   Dyspnea (Lourdes Scale)   Fatigue (Lourdes Scale)   Fatigue (Lourdes Scale)   SpO2% Post Exercise  97 SpO2% Post Exercise   HR Post Exercise  79 HR Post Exercise   Time to Recovery   Time to Recovery     Comments: Walked with patient on a flat surface at his own pace. Patient's O2 sats never dropped below 95

## 2017-01-19 ENCOUNTER — TELEPHONE (OUTPATIENT)
Dept: CARDIAC SURGERY | Facility: CLINIC | Age: 47
End: 2017-01-19

## 2017-01-19 RX ORDER — CARVEDILOL 3.12 MG/1
3.12 TABLET ORAL 2 TIMES DAILY WITH MEALS
Qty: 60 TABLET | Refills: 6 | Status: SHIPPED | OUTPATIENT
Start: 2017-01-19 | End: 2017-01-25

## 2017-01-19 NOTE — TELEPHONE ENCOUNTER
Pt was just discharged yesterday afternoon. His wife/significant other came back up to the CVU unit to clarify his medications. He was discharged on Toprol XL but his insurance is requiring a preauthorization before the pharmacy would dispense so he has already missed 2 doses of beta blocker. They also would only give 3 days worth of the oxycodone so he only got 18 tablets-I had already talked to him about this and thought he would need more pain medication but Dr. Farrell was not available to write a prescription at the time he was discharged (he was in surgery). I discussed the beta blocker issues with  Dr. Carolina's staff and we are switching him to Coreg 3.125mg BID, I sent an e-script for that and Dr. Farrell was available to write a new oxycodone script so this was done and a copy is scanned into the EMR system. I called the pharmacy and spoke to them to let them know that we were discontinuing the Toprol and I had sent a new script for Coreg and that she would be bringing a new oxycodone script.

## 2017-01-25 ENCOUNTER — OFFICE VISIT (OUTPATIENT)
Dept: CARDIOLOGY | Facility: CLINIC | Age: 47
End: 2017-01-25

## 2017-01-25 VITALS
BODY MASS INDEX: 30.75 KG/M2 | SYSTOLIC BLOOD PRESSURE: 140 MMHG | HEIGHT: 73 IN | HEART RATE: 77 BPM | WEIGHT: 232 LBS | OXYGEN SATURATION: 98 % | DIASTOLIC BLOOD PRESSURE: 88 MMHG

## 2017-01-25 DIAGNOSIS — E11.9 TYPE 2 DIABETES MELLITUS TREATED WITH INSULIN (HCC): ICD-10-CM

## 2017-01-25 DIAGNOSIS — I25.10 CORONARY ARTERY DISEASE INVOLVING NATIVE CORONARY ARTERY OF NATIVE HEART WITHOUT ANGINA PECTORIS: Primary | ICD-10-CM

## 2017-01-25 DIAGNOSIS — I48.0 PAROXYSMAL ATRIAL FIBRILLATION (HCC): ICD-10-CM

## 2017-01-25 DIAGNOSIS — Z79.4 TYPE 2 DIABETES MELLITUS TREATED WITH INSULIN (HCC): ICD-10-CM

## 2017-01-25 DIAGNOSIS — Z95.1 S/P CABG (CORONARY ARTERY BYPASS GRAFT): ICD-10-CM

## 2017-01-25 DIAGNOSIS — E78.5 HYPERLIPIDEMIA, UNSPECIFIED HYPERLIPIDEMIA TYPE: ICD-10-CM

## 2017-01-25 PROCEDURE — 93000 ELECTROCARDIOGRAM COMPLETE: CPT | Performed by: PHYSICIAN ASSISTANT

## 2017-01-25 PROCEDURE — 99214 OFFICE O/P EST MOD 30 MIN: CPT | Performed by: PHYSICIAN ASSISTANT

## 2017-01-25 RX ORDER — CARVEDILOL 3.12 MG/1
3.12 TABLET ORAL 2 TIMES DAILY WITH MEALS
COMMUNITY
End: 2017-08-03 | Stop reason: SDUPTHER

## 2017-01-25 RX ORDER — METOPROLOL SUCCINATE 25 MG/1
25 TABLET, EXTENDED RELEASE ORAL DAILY
COMMUNITY
End: 2017-01-25

## 2017-01-25 NOTE — MR AVS SNAPSHOT
Oswald Savage   1/25/2017 9:00 AM   Office Visit    Dept Phone:  [phone]   Encounter #:  [id number]    Provider:  CARLYLE Lima   Department:  [facility] CARDIOLOGY                Your Full Care Plan              Today's Medication Changes          These changes are accurate as of: 1/25/17  9:58 AM.  If you have any questions, ask your nurse or doctor.               Stop taking medication(s)listed here:     carvedilol 3.125 MG tablet   Commonly known as:  COREG   Stopped by:  CARLYLE Lima                      Your Updated Medication List          This list is accurate as of: 1/25/17  9:58 AM.  Always use your most recent med list.                amiodarone 400 MG tablet   Commonly known as:  PACERONE   Take 0.5 tablets by mouth 2 (Two) Times a Day.       aspirin 81 MG EC tablet   Take 1 tablet by mouth Daily.       atorvastatin 20 MG tablet   Commonly known as:  LIPITOR       cyclobenzaprine 10 MG tablet   Commonly known as:  FLEXERIL   Take 1 tablet by mouth Every 8 (Eight) Hours As Needed for muscle spasms.       glucose blood test strip   PRN       insulin detemir 100 UNIT/ML injection   Commonly known as:  LEVEMIR   Inject 20 Units under the skin Every Night.       Insulin Pen Needle 32G X 4 MM misc   As directed       JANUMET  MG per tablet   Generic drug:  sitaGLIPtin-metFORMIN       metoprolol succinate XL 25 MG 24 hr tablet   Commonly known as:  TOPROL-XL       pantoprazole 40 MG EC tablet   Commonly known as:  PROTONIX   Take 1 tablet by mouth Daily.               We Performed the Following     ECG 12 Lead       You Were Diagnosed With        Codes Comments    Coronary artery disease involving native coronary artery of native heart without angina pectoris    -  Primary ICD-10-CM: I25.10  ICD-9-CM: 414.01     S/P CABG (coronary artery bypass graft)     ICD-10-CM: Z95.1  ICD-9-CM: V45.81     Hyperlipidemia, unspecified  hyperlipidemia type     ICD-10-CM: E78.5  ICD-9-CM: 272.4     Paroxysmal atrial fibrillation     ICD-10-CM: I48.0  ICD-9-CM: 427.31     Type 2 diabetes mellitus treated with insulin     ICD-10-CM: E11.9, Z79.4  ICD-9-CM: 250.00, V58.67       Instructions     None    Patient Instructions History      Upcoming Appointments     Visit Type Date Time Department    HOSPITAL FOLLOW UP 2017  9:00 AM [facility]    HOSPITAL FOLLOW UP 2017  9:40 AM [facility]    OFFICE VISIT 2017  9:30 AM [facility]    POST-OP 2017 11:00 AM [facility]      [employer] Signup     [facility] [employer] allows you to send messages to your doctor, view your test results, renew your prescriptions, schedule appointments, and more. To sign up, go to [employer] and click on the Sign Up Now link in the New User? box. Enter your [employer] Activation Code exactly as it appears below along with the last four digits of your Social Security Number and your Date of Birth () to complete the sign-up process. If you do not sign up before the expiration date, you must request a new code.    [employer] Activation Code: [email or web address]  Expires: 2017  5:50 PM    If you have questions, you can email [email or web address] or call [phone] to talk to our [employer] staff. Remember, [employer] is NOT to be used for urgent needs. For medical emergencies, dial 911.               Other Info from Your Visit           Your Appointments     2017  9:40 AM [facility]   Hospital Follow Up with Quincy Carolina MD   [facility] CARDIOLOGY (--)    [address]  [place] [address] [phone]            2017  9:30 AM EST   Office Visit with Basilio PETIT MD   [facility] ENDOCRINOLOGY (--)    [address]  [place] [address] [phone]           Arrive 15 minutes prior to appointment.            Feb  "23, 2017 11:00 AM EST   Post-Op with Alexandre Farrell MD   [facility] CARDIAC SURGERY (--)    [address]  [facility] [address] [phone]              Allergies     No Known Allergies      Reason for Visit     Coronary Artery Disease 1 week hospital follow up      Vital Signs     Blood Pressure Pulse Height Weight Oxygen Saturation Body Mass Index    140/88 (BP Location: Left arm) 77 73\" (185.4 cm) 232 lb (105 kg) 98% 30.61 kg/m2    Smoking Status                   Former Smoker           Problems and Diagnoses Noted     Coronary artery disease involving native coronary artery of native heart without angina pectoris    -  Primary    History of coronary artery bypass graft        High cholesterol or triglycerides        Atrial fibrillation (irregular heartbeat)        Type 2 diabetes mellitus treated with insulin            "

## 2017-01-25 NOTE — PROGRESS NOTES
Date of Office Visit: 2017  Encounter Provider: CARLYLE Lima  Place of Service: [facility] CARDIOLOGY  Patient Name: Oswald Savage  :1970    Chief Complaint   Patient presents with   • Coronary Artery Disease     1 week hospital follow up   :     HPI: Oswald Savage is a 46 y.o. male, new to me, who presents today for follow-up.  Old records a been obtained and reviewed by me.  He is a patient with a past medical history of coronary artery disease, status post MI and PCI in , diabetes, and hypertension.  He does not follow regularly with a cardiologist.  On 2017, the patient presented to the emergency room with substernal chest pressure that started the night before and progressively became worse.  It was associated with shortness of breath and diaphoresis.  It was similar to his pain that he had with his MI in .  He received relief after being given nitroglycerin by EMS.  He ultimately underwent cardiac catheterization.  This revealed a normal left main, 90% OM1 stenosis, 90% OM1 stenosis after the previously placed stent, 70% ostial diagonal stenosis, 80% diagonal 2 stenosis, 70% LAD stenosis, and 95% distal RCA stenosis.  The patient was recommended for coronary artery bypass grafting.  As part of his workup for surgery, a carotid artery ultrasound was performed which was normal and showed no evidence of stenosis in either carotid artery.  On 2017 he underwent a CABG ×5 by Dr. Farrell.  Postoperatively, he was seen by the endocrine service for management of his diabetes.  He had some postop atrial fibrillation/flutter and was ultimately placed on amiodarone and beta blockers.  He was discharged home on 2017 in stable condition.   Since he's been home, he's been doing well.  He is having no more anginal chest pain.  He denies any shortness of breath, edema, palpitations, dizziness, or syncope.  They did talk with him about cardiac  rehabilitation in the hospital, and he would like to participate.  He has not smoked since his surgery.  He has noticed that since he is on insulin, his blood sugars have been elevated.  His home health nurse has reached out to the endocrinologist for guidance on this.      Past Medical History   Diagnosis Date   • CAD (coronary artery disease)    • Diabetes mellitus    • GERD (gastroesophageal reflux disease)    • Heart attack 2007     STENTS X 2 PLACED IN COUNTRY OF [place]   • HLD (hyperlipidemia)    • Hypertension    • Smoker        Past Surgical History   Procedure Laterality Date   • Coronary angioplasty with stent placement  2007   • Cardiac catheterization N/A 1/12/2017     Procedure: Left ventriculography;  Surgeon: Diandra Suarez MD;  Location: [facility] CATH INVASIVE LOCATION;  Service:    • Cardiac catheterization N/A 1/12/2017     Procedure: Left Heart Cath;  Surgeon: Diandra Suarez MD;  Location: [facility] CATH INVASIVE LOCATION;  Service:    • Cardiac catheterization N/A 1/12/2017     Procedure: Coronary angiography;  Surgeon: Diandra Suarez MD;  Location: [facility] CATH INVASIVE LOCATION;  Service:    • Cardiac catheterization  1/12/2017     Procedure: Functional Flow [place];  Surgeon: Diandra Suarez MD;  Location: [facility] CATH INVASIVE LOCATION;  Service:    • Coronary artery bypass graft  01/13/2017     X7   • Coronary artery bypass graft N/A 1/13/2017     Procedure: INTRAOPERATIVE PATY, MIDLINE STERNOTOMY, CORONARY ARTERY BYPASS GRAFTING X 5 UTILIZING LEFT INTERNAL MAMMARY ARTERY AND LEFT ENDOSCOPICALLY HARVESTED GREATER SAPHENOUS VEIN;  Surgeon: Alexandre Farrell MD;  Location: [facility];  Service:        Social History     Social History   • Marital status:      Spouse name: N/A   • Number of children: N/A   • Years of education: N/A     Occupational History   • Not on file.     Social History Main Topics   • Smoking status: Former Smoker     Packs/day: 1.00     Types: Cigarettes   •  Smokeless tobacco: Not on file   • Alcohol use No   • Drug use: No   • Sexual activity: Defer     Other Topics Concern   • Not on file     Social History Narrative       Family History   Problem Relation Age of Onset   • No Known Problems Mother    • Diabetes Father    • Heart disease Father    • Heart attack Father    • No Known Problems Sister    • No Known Problems Brother    • No Known Problems Maternal Grandmother    • No Known Problems Maternal Grandfather    • No Known Problems Paternal Grandmother    • Heart attack Paternal Grandfather    • Heart disease Paternal Grandfather    • Diabetes Paternal Grandfather    • No Known Problems Sister    • No Known Problems Sister    • No Known Problems Brother    • No Known Problems Brother    • No Known Problems Brother        Review of Systems   Constitution: Negative for chills, fever, malaise/fatigue, weight gain and weight loss.   HENT: Negative for ear pain, headaches, hearing loss, nosebleeds and sore throat.    Eyes: Negative for double vision, pain and visual disturbance.   Cardiovascular: Negative for chest pain, dyspnea on exertion, irregular heartbeat, leg swelling, near-syncope, orthopnea, palpitations, paroxysmal nocturnal dyspnea and syncope.   Respiratory: Negative for cough, shortness of breath, sleep disturbances due to breathing, snoring and wheezing.    Endocrine: Negative for cold intolerance, heat intolerance and polyuria.   Skin: Negative for itching and rash.   Musculoskeletal: Negative for joint pain, joint swelling and myalgias.   Gastrointestinal: Negative for abdominal pain, diarrhea, melena, nausea and vomiting.   Genitourinary: Negative for frequency, hematuria and hesitancy.   Neurological: Negative for excessive daytime sleepiness, light-headedness, numbness, paresthesias and seizures.   Psychiatric/Behavioral: Negative for altered mental status and depression.   Allergic/Immunologic: Negative.    All other systems reviewed and are  "negative.      No Known Allergies      Current Outpatient Prescriptions:   •  amiodarone (PACERONE) 400 MG tablet, Take 0.5 tablets by mouth 2 (Two) Times a Day., Disp: 60 tablet, Rfl: 3  •  aspirin 81 MG EC tablet, Take 1 tablet by mouth Daily., Disp: 30 tablet, Rfl: 6  •  atorvastatin (LIPITOR) 20 MG tablet, Take 20 mg by mouth Daily., Disp: , Rfl:   •  carvedilol (COREG) 3.125 MG tablet, Take 3.125 mg by mouth 2 (Two) Times a Day With Meals., Disp: , Rfl:   •  cyclobenzaprine (FLEXERIL) 10 MG tablet, Take 1 tablet by mouth Every 8 (Eight) Hours As Needed for muscle spasms., Disp: 60 tablet, Rfl: 3  •  glucose blood test strip, PRN, Disp: 100 each, Rfl: 4  •  insulin detemir (LEVEMIR) 100 UNIT/ML injection, Inject 20 Units under the skin Every Night., Disp: 15 mL, Rfl: 0  •  Insulin Pen Needle 32G X 4 MM misc, As directed, Disp: 100 each, Rfl: 0  •  pantoprazole (PROTONIX) 40 MG EC tablet, Take 1 tablet by mouth Daily., Disp: 30 tablet, Rfl: 6  •  sitaGLIPtin-metFORMIN (JANUMET)  MG per tablet, Take 1 tablet by mouth 2 (Two) Times a Day With Meals., Disp: , Rfl:      Objective:     Vitals:    01/25/17 0908 01/25/17 0922   BP: 136/80 140/88   BP Location: Right arm Left arm   Pulse: 77    SpO2: 98%    Weight: 232 lb (105 kg)    Height: 73\" (185.4 cm)      Body mass index is 30.61 kg/(m^2).    PHYSICAL EXAM:    Physical Exam   Constitutional: He is oriented to person, place, and time. He appears well-developed and well-nourished. No distress.   HENT:   Head: Normocephalic and atraumatic.   Eyes: Pupils are equal, round, and reactive to light.   Neck: No JVD present. No thyromegaly present.   Cardiovascular: Normal rate, regular rhythm, normal heart sounds and intact distal pulses.    No murmur heard.  Pulmonary/Chest: Effort normal and breath sounds normal. No respiratory distress.   Abdominal: Soft. Bowel sounds are normal. He exhibits no distension. There is no splenomegaly or hepatomegaly. There is no " tenderness.   Musculoskeletal: Normal range of motion. He exhibits no edema.   Neurological: He is alert and oriented to person, place, and time.   Skin: Skin is warm and dry. He is not diaphoretic. No erythema.   Sternal incision and left lower extremity incisions well healed without erythema or edema.   Psychiatric: He has a normal mood and affect. His behavior is normal. Judgment normal.         ECG 12 Lead  Date/Time: 1/25/2017 9:35 AM  Performed by: KIKO BROWN.  Authorized by: KIKO BROWN   Comparison: compared with previous ECG from 1/18/2017  Similar to previous ECG  Rhythm: sinus rhythm  BPM: 77  T depression: V1  T flattening: V1-V6  Clinical impression: abnormal ECG  Comments: Indication: Coronary artery disease, status post CABG.              Assessment:       Diagnosis Plan   1. Coronary artery disease involving native coronary artery of native heart without angina pectoris  ECG 12 Lead   2. S/P CABG (coronary artery bypass graft)  ECG 12 Lead   3. Hyperlipidemia, unspecified hyperlipidemia type     4. Paroxysmal atrial fibrillation     5. Type 2 diabetes mellitus treated with insulin       Orders Placed This Encounter   Procedures   • ECG 12 Lead     This order was created via procedure documentation          Plan:       1.  Coronary Artery Disease  Assessment  • The patient has no angina  • There is a new diagnosis of stable angina in the past 12 months  • The patient is having symptoms consistent with unstable angina     Plan  • Lifestyle modifications discussed include adhering to a heart healthy diet, avoidance of tobacco products, maintenance of a healthy weight, medication compliance, regular exercise and regular monitoring of cholesterol and blood pressure    Subjective - Objective  • There is a history of previous coronary artery bypass graft  • Current antiplatelet therapy includes aspirin 81 mg  • The patient qualifies for cardiac rehabilitation, and has been referred to cardiac  rehab  • Overall, he's doing well.  We did talk about a heart healthy diet, and I'm going to get him enrolled in cardiac rehabilitation.  We also talked about smoking cessation as well.  He is on Lipitor 20 mg daily, and I think that this probably needs to be increased.  I'm going to defer this to Dr. Carolina.   2.  Paroxysmal atrial fibrillation.  He is in sinus rhythm today.  He is on carvedilol and amiodarone.  Continue current medical regimen.    3.  Diabetes mellitus.  He does have an endocrinologist that is working with him on this.  I've encouraged him to contact their office for guidance on how to manage his high blood sugars in his insulin.    He will follow-up with Dr. Carolina on 2/14/2017.    As always, it has been a pleasure to participate in your patient's care.      Sincerely,         Alejandra Andino PA-C

## 2017-01-26 ENCOUNTER — TELEPHONE (OUTPATIENT)
Dept: ENDOCRINOLOGY | Age: 47
End: 2017-01-26

## 2017-01-26 NOTE — TELEPHONE ENCOUNTER
----- Message from Jael Morrison sent at 1/23/2017  1:40 PM EST -----  Contact: Albina nurse with [facility]  Blood sugars have been higher than usual with levomir and hasn't been able to get Blood sugars under 200, Albina doesn't have any blood sugar numbers to give me. [phone]      SPOKE WITH DR. KU AND HE ADVISED TO HAVE PATIENT INCREASE LEVEMIR TO 30 UNITS AND CALL IN ONE WEEK WITH BLOOD SUGAR READINGS. ALBINA WAS NOTIFIED.

## 2017-01-31 ENCOUNTER — TELEPHONE (OUTPATIENT)
Dept: CARDIAC SURGERY | Facility: CLINIC | Age: 47
End: 2017-01-31

## 2017-01-31 NOTE — TELEPHONE ENCOUNTER
[facility] - CHEST INCISION FLUID NO SIGNS OF INFECTION NO TEMP BLOOD SUGAR ELEVATED 200/250 HE WILL SEE HIS PHY ON 2/23/17 TO ADDRESS HIS SUGAR LEVELS A1C 7.2 MR. [facility] AN WE ARE NOT IN NETWORK. WE  WILL NOT BE ABLE TO PROVIDE CARDIAC REHAB.

## 2017-02-03 RX ORDER — OXYCODONE HYDROCHLORIDE 5 MG/1
5 TABLET ORAL EVERY 4 HOURS PRN
Qty: 60 TABLET | Refills: 0 | Status: SHIPPED | OUTPATIENT
Start: 2017-02-03 | End: 2017-02-14 | Stop reason: DRUGHIGH

## 2017-02-03 RX ORDER — OXYCODONE HYDROCHLORIDE 5 MG/1
5 TABLET ORAL EVERY 4 HOURS PRN
Qty: 60 TABLET | Refills: 0 | Status: SHIPPED | OUTPATIENT
Start: 2017-02-03 | End: 2017-02-03 | Stop reason: SDUPTHER

## 2017-02-14 ENCOUNTER — OFFICE VISIT (OUTPATIENT)
Dept: CARDIOLOGY | Facility: CLINIC | Age: 47
End: 2017-02-14

## 2017-02-14 VITALS
WEIGHT: 232 LBS | BODY MASS INDEX: 30.75 KG/M2 | HEART RATE: 65 BPM | SYSTOLIC BLOOD PRESSURE: 130 MMHG | DIASTOLIC BLOOD PRESSURE: 78 MMHG | HEIGHT: 73 IN

## 2017-02-14 DIAGNOSIS — E78.5 HYPERLIPIDEMIA, UNSPECIFIED HYPERLIPIDEMIA TYPE: ICD-10-CM

## 2017-02-14 DIAGNOSIS — I25.810 CORONARY ARTERY DISEASE INVOLVING CORONARY BYPASS GRAFT OF NATIVE HEART WITHOUT ANGINA PECTORIS: Primary | ICD-10-CM

## 2017-02-14 DIAGNOSIS — I48.0 PAROXYSMAL ATRIAL FIBRILLATION (HCC): ICD-10-CM

## 2017-02-14 PROCEDURE — 99213 OFFICE O/P EST LOW 20 MIN: CPT | Performed by: INTERNAL MEDICINE

## 2017-02-14 PROCEDURE — 93000 ELECTROCARDIOGRAM COMPLETE: CPT | Performed by: INTERNAL MEDICINE

## 2017-02-14 RX ORDER — ATORVASTATIN CALCIUM 40 MG/1
40 TABLET, FILM COATED ORAL DAILY
COMMUNITY

## 2017-02-14 RX ORDER — OXYCODONE HCL 10 MG/1
10 TABLET, FILM COATED, EXTENDED RELEASE ORAL EVERY 12 HOURS
COMMUNITY

## 2017-02-14 RX ORDER — GLIMEPIRIDE 2 MG/1
2 TABLET ORAL
COMMUNITY

## 2017-02-14 NOTE — PROGRESS NOTES
Date of Office Visit: 17  Encounter Provider: Quincy Carolina MD  Place of Service: [facility] CARDIOLOGY  Patient Name: Oswald Savage  :1970      Chief Complaint   Patient presents with   • Coronary Artery Disease     History of Present Illness  HPI Comments: Mr Akers is a pleasant 45 yo man with history of CAD, HTN and hyperlipidemia. He has a previous history of coronary disease with previous stenting in [place]. He presented 2017 with unstable angina. He had cardiac catheterization showed normal left ventricular systolic function. Severe three-vessel coronary artery disease. He then underwent coronary artery bypass grafting with left internal mammary artery to the proximal left anterior descending. Sequential vein graft to the acute marginal branch and of the right coronary artery. Vein graft to the first marginal branch of the circumflex and vein graft to the third branch of the circumflex and vein graft sequentially to diagonal one and diagonal 2. He was seen by endocrine diabetes was controlled. His cholesterol medicine was adjusted. He did have some atrial fibrillation/flutter as ultimately placed on amiodarone and beta blockers that remained under control.  He now comes in for follow-up he's been doing great.  He denies any shortness breath, orthopnea, or PND.  Denies any palpitations, near-syncope or syncope.  No lower extremity edema.  He still has some incisional type pain but no exertional pressure or heaviness.  Overall he feels like he's doing well.    Coronary Artery Disease   Pertinent negatives include no dizziness, muscle weakness or weight gain. There is no history of past myocardial infarction.         Past Medical History   Diagnosis Date   • CAD (coronary artery disease)    • Diabetes mellitus    • GERD (gastroesophageal reflux disease)    • Heart attack      ST[place] 2 [place]AC[place]   • HLD (hyperlipidemia)    •  Hypertension    • PAF (paroxysmal atrial fibrillation)    • Smoker          Past Surgical History   Procedure Laterality Date   • Coronary angioplasty with stent placement  2007   • Cardiac catheterization N/A 1/12/2017     Procedure: Left ventriculography;  Surgeon: Diandra Suarez MD;  Location: [facility] CATH INVASIVE LOCATION;  Service:    • Cardiac catheterization N/A 1/12/2017     Procedure: Left Heart Cath;  Surgeon: Diandra Suarez MD;  Location: [facility] CATH INVASIVE LOCATION;  Service:    • Cardiac catheterization N/A 1/12/2017     Procedure: Coronary angiography;  Surgeon: Diandra Suarez MD;  Location: [facility] CATH INVASIVE LOCATION;  Service:    • Cardiac catheterization  1/12/2017     Procedure: Functional Flow [place];  Surgeon: Diandra Suarez MD;  Location: [facility] CATH INVASIVE LOCATION;  Service:    • Coronary artery bypass graft  01/13/2017     X7   • Coronary artery bypass graft N/A 1/13/2017     Procedure: INTRAOPERATIVE PATY, MIDLINE STERNOTOMY, CORONARY ARTERY BYPASS GRAFTING X 5 UTILIZING LEFT INTERNAL MAMMARY ARTERY AND LEFT ENDOSCOPICALLY HARVESTED GREATER SAPHENOUS VEIN;  Surgeon: Alexandre Farrell MD;  Location: [facility];  Service:          Current Outpatient Prescriptions on File Prior to Visit   Medication Sig Dispense Refill   • amiodarone (PACERONE) 400 MG tablet Take 0.5 tablets by mouth 2 (Two) Times a Day. 60 tablet 3   • aspirin 81 MG EC tablet Take 1 tablet by mouth Daily. 30 tablet 6   • carvedilol (COREG) 3.125 MG tablet Take 3.125 mg by mouth 2 (Two) Times a Day With Meals.     • glucose blood test strip  each 4   • insulin detemir (LEVEMIR) 100 UNIT/ML injection Inject 20 Units under the skin Every Night. 15 mL 0   • Insulin Pen Needle 32G X 4 MM misc As directed 100 each 0   • pantoprazole (PROTONIX) 40 MG EC tablet Take 1 tablet by mouth Daily. 30 tablet 6   • sitaGLIPtin-metFORMIN (JANUMET)  MG per tablet Take 1 tablet by mouth 2 (Two) Times a Day With Meals.      • [DISCONTINUED] atorvastatin (LIPITOR) 20 MG tablet Take 20 mg by mouth Daily.     • [DISCONTINUED] cyclobenzaprine (FLEXERIL) 10 MG tablet Take 1 tablet by mouth Every 8 (Eight) Hours As Needed for muscle spasms. 60 tablet 3   • [DISCONTINUED] metFORMIN (GLUCOPHAGE) 500 MG tablet      • [DISCONTINUED] oxyCODONE (ROXICODONE) 5 MG immediate release tablet Take 1 tablet by mouth Every 4 (Four) Hours As Needed for moderate pain (4-6). 60 tablet 0   • [DISCONTINUED] oxyCODONE (ROXICODONE) 5 MG immediate release tablet Take 1 tablet by mouth Every 4 (Four) Hours As Needed for moderate pain (4-6). 60 tablet 0     No current facility-administered medications on file prior to visit.          Social History     Social History   • Marital status:      Spouse name: N/A   • Number of children: N/A   • Years of education: N/A     Occupational History   • Not on file.     Social History Main Topics   • Smoking status: Former Smoker     Packs/day: 1.00     Types: Cigarettes   • Smokeless tobacco: Not on file   • Alcohol use No   • Drug use: No   • Sexual activity: Defer     Other Topics Concern   • Not on file     Social History Narrative       Family History   Problem Relation Age of Onset   • No Known Problems Mother    • Diabetes Father    • Heart disease Father    • Heart attack Father    • No Known Problems Sister    • No Known Problems Brother    • No Known Problems Maternal Grandmother    • No Known Problems Maternal Grandfather    • No Known Problems Paternal Grandmother    • Heart attack Paternal Grandfather    • Heart disease Paternal Grandfather    • Diabetes Paternal Grandfather    • No Known Problems Sister    • No Known Problems Sister    • No Known Problems Brother    • No Known Problems Brother    • No Known Problems Brother          Review of Systems   Constitution: Negative for decreased appetite, diaphoresis, fever, weakness, malaise/fatigue, weight gain and weight loss.   HENT: Negative for  "congestion, headaches, hearing loss, nosebleeds and tinnitus.    Eyes: Negative for blurred vision, double vision, vision loss in left eye, vision loss in right eye and visual disturbance.   Cardiovascular:        As noted in HPI   Respiratory:        As noted HPI   Endocrine: Negative for cold intolerance and heat intolerance.   Hematologic/Lymphatic: Negative for bleeding problem. Does not bruise/bleed easily.   Skin: Negative for color change, flushing, itching and rash.   Musculoskeletal: Negative for arthritis, back pain, joint pain, joint swelling, muscle weakness and myalgias.   Gastrointestinal: Negative for bloating, abdominal pain, constipation, diarrhea, dysphagia, heartburn, hematemesis, hematochezia, melena, nausea and vomiting.   Genitourinary: Negative for bladder incontinence, dysuria, frequency, nocturia and urgency.   Neurological: Negative for dizziness, focal weakness, light-headedness, loss of balance, numbness, paresthesias and vertigo.   Psychiatric/Behavioral: Negative for depression, memory loss and substance abuse.       Procedures      ECG 12 Lead  Date/Time: 2/14/2017 10:22 AM  Performed by: SYDNEY DE PAZ  Authorized by: SYDNEY DE PAZ   Comparison: compared with previous ECG   Similar to previous ECG  Rhythm: sinus rhythm  Rate: normal  T flattening: all  QRS axis: normal                 Objective:      Visit Vitals   • /78 (BP Location: Left arm)   • Pulse 65   • Ht 73\" (185.4 cm)   • Wt 232 lb (105 kg)   • BMI 30.61 kg/m2          Physical Exam  Physical Exam   Constitutional: He is oriented to person, place, and time. He appears well-developed and well-nourished. No distress.   HENT:   Head: Normocephalic.   Eyes: Conjunctivae are normal. Pupils are equal, round, and reactive to light. No scleral icterus.   Neck: Normal carotid pulses, no hepatojugular reflux and no JVD present. Carotid bruit is not present. No tracheal deviation, no edema and no erythema present. No " thyromegaly present.   Cardiovascular: Normal rate, regular rhythm, S1 normal, S2 normal, normal heart sounds and intact distal pulses.   No extrasystoles are present. PMI is not displaced.  Exam reveals no gallop, no distant heart sounds and no friction rub.    No murmur heard.  Pulses:       Carotid pulses are 2+ on the right side, and 2+ on the left side.       Radial pulses are 2+ on the right side, and 2+ on the left side.        Femoral pulses are 2+ on the right side, and 2+ on the left side.       Dorsalis pedis pulses are 2+ on the right side, and 2+ on the left side.        Posterior tibial pulses are 2+ on the right side, and 2+ on the left side.   Pulmonary/Chest: Effort normal and breath sounds normal. No respiratory distress. He has no decreased breath sounds. He has no wheezes. He has no rhonchi. He has no rales. He exhibits no tenderness.   Abdominal: Soft. Bowel sounds are normal. He exhibits no distension and no mass. There is no hepatosplenomegaly. There is no tenderness. There is no rebound and no guarding.   Musculoskeletal: He exhibits no edema, tenderness or deformity.   Neurological: He is alert and oriented to person, place, and time.   Skin: Skin is warm and dry. No rash noted. He is not diaphoretic. No cyanosis or erythema. No pallor. Nails show no clubbing.   Psychiatric: He has a normal mood and affect. His speech is normal and behavior is normal. Judgment and thought content normal.           Assessment:   1.  38-year-old gentleman with history of severe coronary artery disease previous stenting while living in [place] then ultimate coronary bypass grafting in January 2017.  Preserved left ventricular ejection fraction.  Coronary Artery Disease  Assessment  • The patient has no angina    Plan  • Lifestyle modifications discussed include adhering to a heart healthy diet, avoidance of tobacco products, maintenance of a healthy weight, medication compliance, regular exercise and regular  monitoring of cholesterol and blood pressure    Subjective - Objective  • There is a history of previous coronary artery bypass graft  • There has been a previous stent procedure using UMM  • Current antiplatelet therapy includes aspirin 81 mg  • The patient qualifies for cardiac rehabilitation, and has been referred to cardiac rehab      He is to continue the same he will see us skin in follow-up in 4 months we did encourage him to start cardiac rehabilitation he's going to consider that.    2.  Hypertension blood pressure adequately controlled continue the carvedilol.  If his blood pressure elevates could restart the lisinopril.  3.  Hyperlipidemia now on target dose atorvastatin continue the same.  4.  Postop atrial fibrillation.  We will allow him to stop the amiodarone.          Plan:

## 2017-02-17 ENCOUNTER — TELEPHONE (OUTPATIENT)
Dept: CARDIAC SURGERY | Facility: CLINIC | Age: 47
End: 2017-02-17

## 2017-02-17 NOTE — TELEPHONE ENCOUNTER
Patients wife called requesting more pain medication. I ran the inspect report and he has already received one refill of the oxycodone from Dr Farrell. I let her know that our office would not provide more of this medication. She asked for an alternative and I let her know he could substitute Tylenol. She inquired about naproxen which they apparently have at home. I spoke with Tita BURCIAGA who said no to the naproxen and asked that I let them know not to exceed 4 grams of the Tylenol in a 24 hour period. I explained this to the patients wife and she verbalized understanding.

## 2017-02-23 ENCOUNTER — OFFICE VISIT (OUTPATIENT)
Dept: CARDIAC SURGERY | Facility: CLINIC | Age: 47
End: 2017-02-23

## 2017-02-23 VITALS
OXYGEN SATURATION: 98 % | WEIGHT: 226 LBS | HEART RATE: 62 BPM | TEMPERATURE: 98 F | SYSTOLIC BLOOD PRESSURE: 151 MMHG | DIASTOLIC BLOOD PRESSURE: 89 MMHG | RESPIRATION RATE: 16 BRPM | BODY MASS INDEX: 29.82 KG/M2

## 2017-02-23 DIAGNOSIS — Z95.1 S/P CABG X 7: Primary | ICD-10-CM

## 2017-02-23 PROCEDURE — 99024 POSTOP FOLLOW-UP VISIT: CPT | Performed by: THORACIC SURGERY (CARDIOTHORACIC VASCULAR SURGERY)

## 2017-08-03 RX ORDER — CARVEDILOL 3.12 MG/1
TABLET ORAL
Qty: 60 TABLET | Refills: 1 | Status: SHIPPED | OUTPATIENT
Start: 2017-08-03 | End: 2017-10-05 | Stop reason: SDUPTHER

## 2017-09-13 RX ORDER — CARVEDILOL 3.12 MG/1
TABLET ORAL
Qty: 60 TABLET | Refills: 0 | OUTPATIENT
Start: 2017-09-13

## 2017-10-06 RX ORDER — CARVEDILOL 3.12 MG/1
TABLET ORAL
Qty: 60 TABLET | Refills: 0 | Status: SHIPPED | OUTPATIENT
Start: 2017-10-06 | End: 2017-11-16 | Stop reason: SDUPTHER

## 2017-11-07 RX ORDER — CARVEDILOL 3.12 MG/1
TABLET ORAL
Qty: 60 TABLET | Refills: 0 | OUTPATIENT
Start: 2017-11-07

## 2017-11-19 RX ORDER — CARVEDILOL 3.12 MG/1
3.12 TABLET ORAL 2 TIMES DAILY WITH MEALS
Qty: 60 TABLET | Refills: 0 | Status: SHIPPED | OUTPATIENT
Start: 2017-11-19 | End: 2017-12-18 | Stop reason: SDUPTHER

## 2017-12-19 RX ORDER — CARVEDILOL 3.12 MG/1
TABLET ORAL
Qty: 60 TABLET | Refills: 0 | Status: SHIPPED | OUTPATIENT
Start: 2017-12-19 | End: 2018-01-17 | Stop reason: SDUPTHER

## 2018-01-18 RX ORDER — CARVEDILOL 3.12 MG/1
TABLET ORAL
Qty: 60 TABLET | Refills: 3 | Status: SHIPPED | OUTPATIENT
Start: 2018-01-18 | End: 2018-05-07 | Stop reason: SDUPTHER

## 2018-05-10 RX ORDER — CARVEDILOL 3.12 MG/1
TABLET ORAL
Qty: 60 TABLET | Refills: 0 | Status: SHIPPED | OUTPATIENT
Start: 2018-05-10 | End: 2018-05-18 | Stop reason: SDUPTHER

## 2018-05-18 RX ORDER — CARVEDILOL 3.12 MG/1
TABLET ORAL
Qty: 60 TABLET | Refills: 0 | Status: SHIPPED | OUTPATIENT
Start: 2018-05-18 | End: 2018-08-17 | Stop reason: SDUPTHER

## 2018-08-17 RX ORDER — CARVEDILOL 3.12 MG/1
TABLET ORAL
Qty: 60 TABLET | Refills: 0 | Status: SHIPPED | OUTPATIENT
Start: 2018-08-17 | End: 2018-09-12 | Stop reason: SDUPTHER

## 2018-09-27 RX ORDER — CARVEDILOL 3.12 MG/1
TABLET ORAL
Qty: 15 TABLET | Refills: 0 | Status: SHIPPED | OUTPATIENT
Start: 2018-09-27

## 2018-10-11 RX ORDER — CARVEDILOL 3.12 MG/1
TABLET ORAL
Qty: 60 TABLET | Refills: 0 | Status: SHIPPED | OUTPATIENT
Start: 2018-10-11 | End: 2019-01-07 | Stop reason: SDUPTHER

## 2019-01-08 RX ORDER — CARVEDILOL 3.12 MG/1
TABLET ORAL
Qty: 60 TABLET | Refills: 0 | Status: SHIPPED | OUTPATIENT
Start: 2019-01-08

## 2019-02-22 RX ORDER — CARVEDILOL 3.12 MG/1
TABLET ORAL
Qty: 60 TABLET | Refills: 0 | Status: SHIPPED | OUTPATIENT
Start: 2019-02-22

## 2021-10-29 NOTE — PROGRESS NOTES
CARDIOVASCULAR SURGERY FOLLOW-UP PROGRESS NOTE  Chief Complaint: Here for follow-up after CABG ×7        HPI:   Dear Dr. Rose Marie Farr MD and colleagues:    It was nice to see Oswald Savage in follow up 6 weeks  after surgery.  As you know, he is a 46 y.o. male with coronary artery disease who underwent CABG ×7. He did well postoperatively and continues to do well. He comes in today complaining of incisional pain and this is been mild..  His activity level has been good.       Physical Exam:         Visit Vitals   • /89 (BP Location: Left arm, Patient Position: Sitting, Cuff Size: Large Adult)   • Pulse 62   • Temp 98 °F (36.7 °C) (Oral)   • Resp 16   • Wt 226 lb (103 kg)   • SpO2 98%   • BMI 29.82 kg/m2     Heart:  regular rate and rhythm, S1, S2 normal, no murmur, click, rub or gallop  Lungs:  clear to auscultation bilaterally  Extremities:  no edema  Incision(s):  Mid chest healing well, left leg healing well, sternum stable    Assessment/Plan:     S/P CABG. Overall, he is doing well.    No significant post-op complications    Keep incisions clean and dry  OK to drive if not taking narcotic pain medicine  OK to begin cardiac rehab  Follow-up as scheduled with cardiology  Follow-up as scheduled with PCP  Follow-up with CT surgery prn    No restrictions of activity.      Thank you for allowing me to participate in the care of your   patient.  Regards,  Alexandre Farrell MD   endoscopy

## 2022-04-05 NOTE — DISCHARGE SUMMARY
Date of Discharge:  1/18/2017  Date of Admit: 1/12/2017    Discharge Diagnosis:  Problem List Items Addressed This Visit        Nervous and Auditory    Chest pain at rest - Primary    Relevant Orders    Case Request (Completed)      Other Visit Diagnoses     Angina at rest        Relevant Medications    metoprolol succinate XL (TOPROL-XL) 25 MG 24 hr tablet          Hospital Course:   Patient has a previous history of coronary disease with previous stenting.  He presented January 2017 with unstable angina.  He had cardiac catheterization showed normal left ventricular systolic function.  Severe three-vessel coronary artery disease.  He then underwent coronary artery bypass grafting with left internal mammary artery to the proximal left anterior descending.  Sequential vein graft to the acute marginal branch and of the right coronary artery.  Vein graft to the first marginal branch of the circumflex and vein graft to the third branch of the circumflex and vein graft sequentially to diagonal one and diagonal 2.  He was seen by endocrine diabetes was controlled.  His cholesterol medicine was adjusted.  He did have some atrial fibrillation/flutter as ultimately placed on amiodarone and beta blockers that remained under control he was stable for discharge January 18, 2017.    Procedures Performed  Procedure(s):  INTRAOPERATIVE PATY, MIDLINE STERNOTOMY, CORONARY ARTERY BYPASS GRAFTING X 5 UTILIZING LEFT INTERNAL MAMMARY ARTERY AND LEFT ENDOSCOPICALLY HARVESTED GREATER SAPHENOUS VEIN       Consults     Date and Time Order Name Status Description    1/15/2017 1021 Inpatient Consult to Pulmonology Completed     1/14/2017 0800 Inpatient Consult to Endocrinology Completed     1/12/2017 1425 Inpatient Consult to Cardiothoracic Surgery In process     1/12/2017 0207 LCG (on-call MD unless specified) Completed             Discharge Medications   Oswald Savage   Home Medication Instructions YAMILEX:[id number]    Printed  on:01/18/17 0709   Medication Information                      amiodarone (PACERONE) 400 MG tablet  Take 0.5 tablets by mouth 2 (Two) Times a Day.             aspirin 81 MG EC tablet  Take 1 tablet by mouth Daily.             atorvastatin (LIPITOR) 20 MG tablet  Take 20 mg by mouth Daily.             cyclobenzaprine (FLEXERIL) 10 MG tablet  Take 1 tablet by mouth Every 8 (Eight) Hours As Needed for muscle spasms.             metFORMIN (GLUCOPHAGE) 500 MG tablet  Take 1 tablet by mouth 2 (Two) Times a Day With Meals.             metoprolol succinate XL (TOPROL-XL) 25 MG 24 hr tablet  Take 1 tablet by mouth Every 12 (Twelve) Hours.             pantoprazole (PROTONIX) 40 MG EC tablet  Take 1 tablet by mouth Daily.             SITagliptin (JANUVIA) 100 MG tablet  Take 1 tablet by mouth Daily.             sitaGLIPtin-metFORMIN (JANUMET)  MG per tablet  Take 1 tablet by mouth 2 (Two) Times a Day With Meals.                   Activity at Discharge:     Follow-up Appointments  Follow-up Information     Follow up with [facility] .    Specialty:  Home Health Services    Contact information:    [address] [facility] [address]  [facility] [address] [phone]        Follow up with [facility] Follow up in 1 week(s).    Specialty:  Cardiology    Contact information:    [address] [address]  [phone]          Follow up with Quincy Carolina MD Follow up in 1 month(s).    Specialty:  Cardiology    Contact information:    [address] [address]  [phone]              DISCHARGE DISPOSITION:  Patient is to be discharged today.  Will follow-up with her physician assistant in one week at that time consider enrolling him in cardiac rehabilitation.  He'll see me in 4 weeks.     Quincy Carolina MD  01/18/17  7:09 AM     This was a shared visit with the MELCHOR. I reviewed and verified the documentation and independently performed the documented:

## 2022-08-16 NOTE — Clinical Note
Chief Complaint   Patient presents with    Hip Pain     Right DENIZ DOS 2/2/22. Hip doing well. No complaints. Dion Joseph returns today for follow-up of his right DENIZ. DOS: 2/2/22. He reports this is better than when I saw the patient last.     Past Medical History:   Diagnosis Date    COVID-19 12/2021     Past Surgical History:   Procedure Laterality Date    COLONOSCOPY      TOTAL HIP ARTHROPLASTY Right 2/2/2022    RIGHT HIP TOTAL ARTHROPLASTY (DEPUY) performed by Maddy Henry DO at [address]       Current Outpatient Medications:     cephALEXin (KEFLEX) 500 MG capsule, Take 4 capsules by mouth daily as needed (take 2 g, 1 hour prior to dental procedure), Disp: 4 capsule, Rfl: 0    vitamin D 25 MCG (1000 UT) CAPS, Vitamin D3 25 mcg (1,000 unit) capsule  TAKE 1 CAPSULE BY MOUTH EVERY DAY, Disp: , Rfl:     COVID-19 mRNA Vacc, Moderna, 100 MCG/0.5ML SUSP injection, Moderna COVID-19 Vaccine (PF) 100 mcg/0.5 mL intramuscular susp. (EUA)  ADMINISTER ONE SHOT INTRAMUSCULARLY INTO THE LEFT DELTOID, Disp: , Rfl:     aspirin 325 MG EC tablet, Take 1 tablet by mouth 2 times daily for 28 days, Disp: 56 tablet, Rfl: 0    gabapentin (NEURONTIN) 100 MG capsule, Take 1 capsule by mouth 3 times daily for 30 days.  Intended supply: 30 days, Disp: 90 capsule, Rfl: 0    celecoxib (CELEBREX) 100 MG capsule, Take 1 capsule by mouth 2 times daily, Disp: 60 capsule, Rfl: 0    pravastatin (PRAVACHOL) 20 MG tablet, TK 1 T PO QD HS, Disp: , Rfl: 1  No Known Allergies  Social History     Socioeconomic History    Marital status: Unknown     Spouse name: Not on file    Number of children: Not on file    Years of education: Not on file    Highest education level: Not on file   Occupational History    Not on file   Tobacco Use    Smoking status: Former    Smokeless tobacco: Never   Substance and Sexual Activity    Alcohol use: Not on file     Comment: rarely    Drug use: Never    Sexual activity: Not on file   Other Topics Concern    Not removed.   on file   Social History Narrative    Not on file     Social Determinants of Health     Financial Resource Strain: Not on file   Food Insecurity: Not on file   Transportation Needs: Not on file   Physical Activity: Not on file   Stress: Not on file   Social Connections: Not on file   Intimate Partner Violence: Not on file   Housing Stability: Not on file       Review of Systems:   Skin: (-) rash,(-) psoriasis,(-) eczema, (-)skin cancer. Musculoskeletal: (-) fractures,  (-) dislocations,(-) collagen vascular disease, (-) fibromyalgia, (-) multiple sclerosis, (-) muscular dystrophy, (-) RSD,(-) joint pain (-)swelling, (-) joint pain,swelling. Neurologic: (-) epilepsy, (-)seizures,(-) brain tumor,(-) TIA, (-)stroke, (-)headaches, (-)Parkinson disease,(-) memory loss, (-) LOC. Cardiovascular: (-) Chest pain, (-) swelling in legs/feet, (-) SOB, (-) cramping in legs/feet with walking. Subjective:    Constitutional:  The patient is alert and oriented x 3, appears to be stated age and in no distress. Temp 98 °F (36.7 °C)   Ht 5' 10\" (1.778 m)   Wt 220 lb (99.8 kg)   BMI 31.57 kg/m²     Skin:  Upon inspection: the skin appears warm, dry and intact. There is a previous scar over the affected area. There is not any cellulitis, lymphedema or cutaneous lesions noted in the lower extremities. Upon palpation there is no induration noted. Neurologic:  Gait: normal;  Motor exam of the lower extremities show ; quadriceps, hamstrings, foot dorsi and plantar flexors intact R.  5/5 and L. 5/5. Deep tendon reflexes are 2/4 at the knees and 2/4 at the ankles with strong extensor hallicus longus motor strength bilaterally. Sensory to both feet is intact to all sensory roots. Cardiovascular: The vascular exam is normal and is well perfused to distal extremities. Distal pulses DP/PT: R. 2+; L. 2+. There is cap refill noted less than two seconds in all digits. There is not edema of the bilateral lower extremities. There is not varicosities noted in the distal extremities. Lymph:  Upon palpation,  there is no lymphadenopathy noted in bilateral lower extremities. Musculoskeletal:    Lumbar exam:  On visual inspection, there is not deformity of the spine. full range of motion, no tenderness, palpable spasm or pain on motion. Special tests: Straight Leg Raise negative, Skylar testnegative. Hip Exam:  Exam of the right hip shows none leg length discrepancy. He has a normal gait Range of motion of the involved hip is normal and full, the hip joint is stable to testing. Strength of the lower extremity is normal. Patient deneisof tenderness at the  groin. Knee exam :  bilateral knee exam shows;  range of motion of R. Knee is 0 to 120, and L. Knee is 0 to 120. The patient does not have  pain on motion, there is not an effusion, there is not tenderness over the  medial, lateral, anterior region, there are not any masses, there is not ligamentous instability, there is not  deformity noted. Xrays: right DENIZ well aligned with no signs of aseptic loosening    Impression:   Encounter Diagnosis   Name Primary?     Status post right hip replacement Yes       Plan:   Hep  Activity as tolerated  Fu in 6 months with xr

## 2022-11-02 NOTE — PROGRESS NOTES
" LOS: 3 days   Patient Care Team:  Rose Marie Farr MD as PCP - General (Internal Medicine)    Chief Complaint: F/u CABG    Subjective     Better now that HR is back to normal (he was in PAFL this am with RVR)      Vital Signs  Temp:  [97.7 °F (36.5 °C)-98.9 °F (37.2 °C)] 97.7 °F (36.5 °C)  Heart Rate:  [] 68  Resp:  [16-18] 17  BP: (107-151)/(72-96) 135/72  Body mass index is 30.79 kg/(m^2).    Intake/Output Summary (Last 24 hours) at 01/16/17 1017  Last data filed at 01/16/17 0758   Gross per 24 hour   Intake    720 ml   Output      0 ml   Net    720 ml     I/O this shift:  In: 120 [P.O.:120]  Out: -       Last 3 weights    01/13/17  1930 01/15/17  0646 01/16/17  0500   Weight: 234 lb (106 kg) 234 lb 9.6 oz (106 kg) 233 lb 6.4 oz (106 kg)         Objective:  General Appearance:  Comfortable and in no acute distress.    Vital signs: (most recent): Blood pressure 135/72, pulse 68, temperature 97.7 °F (36.5 °C), temperature source Oral, resp. rate 17, height 73\" (185.4 cm), weight 233 lb 6.4 oz (106 kg), SpO2 93 %.  Vital signs are normal.    Lungs:  Normal respiratory rate and normal effort.  Breath sounds clear to auscultation.    Heart: Normal rate.  Regular rhythm.  S1 normal and S2 normal.  (Tele: SR)  Abdomen: Abdomen is soft.    Extremities: Normal range of motion.    Pulses: Distal pulses are intact.    Neurological: Patient is alert and oriented to person, place and time.    Skin:  Warm and dry.  (Incision wnl)            Results Review:        WBC WBC   Date Value Ref Range Status   01/16/2017 10.00 4.50 - 10.70 10*3/mm3 Final   01/15/2017 12.74 (H) 4.50 - 10.70 10*3/mm3 Final   01/14/2017 11.02 (H) 4.50 - 10.70 10*3/mm3 Final   01/13/2017 10.80 (H) 4.50 - 10.70 10*3/mm3 Final   01/13/2017 8.75 4.50 - 10.70 10*3/mm3 Final      HGB HEMOGLOBIN   Date Value Ref Range Status   01/16/2017 15.3 13.7 - 17.6 g/dL Final   01/15/2017 14.0 13.7 - 17.6 g/dL Final   01/14/2017 13.4 (L) 13.7 - 17.6 g/dL Final " Multilayer Compression Wrap   (Not Unna) Below the Knee    NAME:  Winnie Simpson OF BIRTH:  1956  MEDICAL RECORD NUMBER:  [id number]  DATE:  11/2/2022    Removed old Multilayer wrap if indicated and wash leg with mild soap/water. Applied moisturizing agent to dry skin as needed. Applied primary and secondary dressing as ordered. Applied multilayered dressing below the knee to left lower leg.     Response to treatment: Well tolerated by patient       Electronically signed by Deandra King LPN on 24/2/1034 at 1:48 PM   01/13/2017 14.3 13.7 - 17.6 g/dL Final   01/13/2017 14.6 13.7 - 17.6 g/dL Final   01/13/2017 11.6 (L) 12.0 - 17.0 g/dL Final   01/13/2017 11.6 (L) 12.0 - 17.0 g/dL Final      HCT HEMATOCRIT   Date Value Ref Range Status   01/16/2017 43.1 40.4 - 52.2 % Final   01/15/2017 40.8 40.4 - 52.2 % Final   01/14/2017 40.1 (L) 40.4 - 52.2 % Final   01/13/2017 39.8 (L) 40.4 - 52.2 % Final   01/13/2017 41.2 40.4 - 52.2 % Final   01/13/2017 34 (L) 38 - 51 % Final   01/13/2017 34 (L) 38 - 51 % Final      Platelets PLATELETS   Date Value Ref Range Status   01/16/2017 151 140 - 500 10*3/mm3 Final   01/15/2017 142 140 - 500 10*3/mm3 Final   01/14/2017 128 (L) 140 - 500 10*3/mm3 Final   01/13/2017 117 (L) 140 - 500 10*3/mm3 Final   01/13/2017 113 (L) 140 - 500 10*3/mm3 Final        PT/INR:    PROTIME   Date Value Ref Range Status   01/14/2017 15.3 (H) 11.7 - 14.2 Seconds Final   01/13/2017 15.2 (H) 11.7 - 14.2 Seconds Final   /  INR   Date Value Ref Range Status   01/14/2017 1.26 (H) 0.90 - 1.10 Final   01/13/2017 1.25 (H) 0.90 - 1.10 Final       Sodium SODIUM   Date Value Ref Range Status   01/16/2017 137 136 - 145 mmol/L Final   01/15/2017 138 136 - 145 mmol/L Final   01/14/2017 145 136 - 145 mmol/L Final   01/13/2017 145 136 - 145 mmol/L Final   01/13/2017 143 136 - 145 mmol/L Final      Potassium POTASSIUM   Date Value Ref Range Status   01/16/2017 4.2 3.5 - 5.2 mmol/L Final   01/15/2017 4.4 3.5 - 5.2 mmol/L Final   01/14/2017 4.3 3.5 - 5.2 mmol/L Final   01/13/2017 4.8 3.5 - 5.2 mmol/L Final   01/13/2017 4.3 3.5 - 5.2 mmol/L Final   01/13/2017 4.0 3.5 - 5.2 mmol/L Final      Chloride CHLORIDE   Date Value Ref Range Status   01/16/2017 95 (L) 98 - 107 mmol/L Final   01/15/2017 98 98 - 107 mmol/L Final   01/14/2017 103 98 - 107 mmol/L Final   01/13/2017 107 98 - 107 mmol/L Final   01/13/2017 104 98 - 107 mmol/L Final      Bicarbonate CO2   Date Value Ref Range Status   01/16/2017 24.7 22.0 - 29.0 mmol/L Final   01/15/2017 24.3  22.0 - 29.0 mmol/L Final   01/14/2017 26.3 22.0 - 29.0 mmol/L Final   01/13/2017 23.6 22.0 - 29.0 mmol/L Final   01/13/2017 25.7 22.0 - 29.0 mmol/L Final      BUN BUN   Date Value Ref Range Status   01/16/2017 14 6 - 20 mg/dL Final   01/15/2017 11 6 - 20 mg/dL Final   01/14/2017 12 6 - 20 mg/dL Final   01/13/2017 11 6 - 20 mg/dL Final   01/13/2017 11 6 - 20 mg/dL Final      Creatinine CREATININE   Date Value Ref Range Status   01/16/2017 0.80 0.76 - 1.27 mg/dL Final   01/15/2017 0.84 0.76 - 1.27 mg/dL Final   01/14/2017 1.02 0.76 - 1.27 mg/dL Final   01/13/2017 1.07 0.76 - 1.27 mg/dL Final   01/13/2017 1.24 0.76 - 1.27 mg/dL Final      Calcium CALCIUM   Date Value Ref Range Status   01/16/2017 9.6 8.6 - 10.5 mg/dL Final   01/15/2017 9.4 8.6 - 10.5 mg/dL Final   01/14/2017 9.1 8.6 - 10.5 mg/dL Final   01/13/2017 9.1 8.6 - 10.5 mg/dL Final   01/13/2017 9.3 8.6 - 10.5 mg/dL Final      Magnesium MAGNESIUM   Date Value Ref Range Status   01/14/2017 2.4 1.6 - 2.6 mg/dL Final   01/13/2017 2.4 1.6 - 2.6 mg/dL Final   01/13/2017 2.9 (H) 1.6 - 2.6 mg/dL Final            amiodarone 400 mg Oral Q12H   aspirin 81 mg Oral Daily   atorvastatin 40 mg Oral Nightly   enoxaparin 40 mg Subcutaneous Q24H   guaiFENesin 1,200 mg Oral BID   insulin aspart 2-5 Units Subcutaneous 4x Daily AC & at Bedtime   metoprolol tartrate 25 mg Oral Q12H   mupirocin 1 application Each Nare Daily   pantoprazole 40 mg Oral Q AM   sennosides-docusate sodium 2 tablet Oral Nightly   SITagliptin 100 mg Oral Daily       niCARdipine 5-15 mg/hr Last Rate: Stopped (01/14/17 3282)           Patient Active Problem List   Diagnosis Code   • Chest pain at rest R07.9       Assessment & Plan     -Severe multivessel CAD w/preserved LV systolic function, EF 55%  -Hx of CAD with PCI in the past  -POD#3 CABG x7 w/LIMA   -Expected post op acute blood loss anemia, stable  -Thrombocytopenia, resolved  -HTN-stable  -HLD, treated  -DM, treated   -Postop PAFL, now in SR, amio per  cardiology    Doing better now that he is back in SR, continue to increase activity and enc. Pul toilet.       Tita Jorgensen, APRN  01/16/17  10:17 AM

## (undated) DEVICE — SOL IRR NACL 0.9PCT BT 1000ML

## (undated) DEVICE — HARMONIC SYNERGY DISSECTING HOOK WITH TORQUE WRENCH. FOR USE WITH BLUE HAND PIECE ONLY: Brand: HARMONIC SYNERGY

## (undated) DEVICE — 6F .070 XB LAD 3.5 100CM: Brand: VISTA BRITE TIP

## (undated) DEVICE — CATH DIAG RADL PERFORMA ULTIMATE ULT1 5F .038IN 100CM

## (undated) DEVICE — ROTATING SURGICAL PUNCHES, 1 PER POUCH: Brand: A&E MEDICAL / ROTATING SURGICAL PUNCHES

## (undated) DEVICE — CLAMP INSERT: Brand: STEALTH® CLAMP INSERT

## (undated) DEVICE — PK CATH CARD 40

## (undated) DEVICE — PK HEART OPN 40

## (undated) DEVICE — CATH F5 INF JL 4.5 100CM: Brand: INFINITI

## (undated) DEVICE — CORONARY ARTERY BYPASS GRAFT MARKERS, STAINLESS STEEL, DISTAL, WITHOUT HOLDER: Brand: ANASTOMARK CORONARY ARTERY BYPASS GRAFT MARKERS, STAINLESS STEEL, DISTAL

## (undated) DEVICE — MEDICINE CUP, GRADUATED, STER: Brand: MEDLINE

## (undated) DEVICE — Device

## (undated) DEVICE — CATH DIAG IMPULSE AL1 5F 100CM

## (undated) DEVICE — DRSNG BRDR MEPILEX P/OP SIL 4X12IN

## (undated) DEVICE — APPL CHLORAPREP W/TINT 26ML ORNG

## (undated) DEVICE — SOL IRR H2O BTL 1000ML STRL

## (undated) DEVICE — CARTRDG CG8 PLS

## (undated) DEVICE — Device: Brand: LEVEL 1

## (undated) DEVICE — GLV SURG BIOGEL M LTX PF 7 1/2

## (undated) DEVICE — CATH ART RADL 20GA 1 3/4IN LF

## (undated) DEVICE — CANN ART EOPA 3D NV W/CONN 22F

## (undated) DEVICE — GW PRESSUREWIRE AERIS W/ AGILE TP 175CM

## (undated) DEVICE — KT CATH CV ACC 2L MAC 4 9/16IN

## (undated) DEVICE — PK ATS CUST W CARDIOTOMY RESEVOIR

## (undated) DEVICE — CATH VENT MIV RADL PIG ST TIP 5F 110CM

## (undated) DEVICE — PK PERFUS CUST W/CARDIOPLEGIA

## (undated) DEVICE — ISO/ALC 70PCT 4OZ

## (undated) DEVICE — HEMOCONCENTRATOR PERFUS LPS06

## (undated) DEVICE — DRSNG SURESITE WNDW 2.38X2.75

## (undated) DEVICE — SYS PERFUS SEP PLATLT W TIPS CUST

## (undated) DEVICE — BND PRESS RADL COMFRT 14IN STRL

## (undated) DEVICE — LOU OPEN HEART DR POLLOCK: Brand: MEDLINE INDUSTRIES, INC.

## (undated) DEVICE — DRSNG WND GEL FIBR OPTICELL AG PLS W/SLV LF 4X5IN  STRL

## (undated) DEVICE — CATH DIAG IMPULSE FL3.5 5F 100CM

## (undated) DEVICE — GLIDESHEATH BASIC HYDROPHILIC COATED INTRODUCER SHEATH: Brand: GLIDESHEATH

## (undated) DEVICE — BIOPATCH™ ANTIMICROBIAL DRESSING WITH CHLORHEXIDINE GLUCONATE IS A HYDROPHILLIC POLYURETHANE ABSORPTIVE FOAM WITH CHLORHEXIDINE GLUCONATE (CHG) WHICH INHIBITS BACTERIAL GROWTH UNDER THE DRESSING. THE DRESSING IS INTENDED TO BE USED TO ABSORB EXUDATE, COVER A WOUND CAUSED BY VASCULAR AND NONVASCULAR PERCUTANEOUS MEDICAL DEVICES DURING SURGERY, AS WELL AS REDUCE LOCAL INFECTION AND COLONIZATION OF MICROORGANISMS.: Brand: BIOPATCH

## (undated) DEVICE — ST. SORBAVIEW ULTIMATE IJ SYSTEM A,C: Brand: CENTURION

## (undated) DEVICE — CANN ART DLP 1PC EDPA A/ 22F

## (undated) DEVICE — GW EMR FIX EXCHG J STD .035 3MM 260CM

## (undated) DEVICE — OASIS DRAIN, DUAL, IN-LINE, ATS COMPATIBLE: Brand: OASIS

## (undated) DEVICE — BNDG ELAS ELITE V/CLOSE 6IN 5YD LF STRL

## (undated) DEVICE — EXTENSION SET, MALE LUER LOCK ADAPTER WITH RETRACTABLE COLLAR

## (undated) DEVICE — CARTRDG ISTAT KAOLIN ACT

## (undated) DEVICE — DRN WND CH RND FUL/FLUT NO/TROC 3/8IN 28F

## (undated) DEVICE — ADHS SKIN DERMABOND TOP ADVANCED

## (undated) DEVICE — KT MANIFLD CARDIAC

## (undated) DEVICE — CONN TBG Y 5 IN 1 LF STRL

## (undated) DEVICE — CATH DIAG IMPULSE FR4 5F 100CM

## (undated) DEVICE — 28 FR RIGHT ANGLE – SOFT PVC CATHETER: Brand: PVC THORACIC CATHETERS

## (undated) DEVICE — INSUFFLATION TUBING,LAPAROSCOPIC: Brand: DEROYAL

## (undated) DEVICE — DEV INDEFLATOR

## (undated) DEVICE — SPNG GZ WOVN 4X4IN 12PLY 10/BX STRL

## (undated) DEVICE — 6 FOOT DISPOSABLE EXTENSION CABLE WITH SAFE CONNECT / SCREW-DOWN

## (undated) DEVICE — BNDG ELAS ELITE V/CLOSE 4IN 5YD LF STRL

## (undated) DEVICE — CATH TDILUT SWANGANZ VIP 7.5F 110CM

## (undated) DEVICE — BLAKE SILICONE DRAINS CARDIO CONNECTOR 2:1: Brand: BLAKE

## (undated) DEVICE — BLOWER/MISTER AXIOUS OPCAB W/TBG

## (undated) DEVICE — SENSR CERBRL O2 PK/2

## (undated) DEVICE — SYS VASOVIEW HEMOPRO ENDOSCOPIC HARVST VESL